# Patient Record
Sex: FEMALE | Race: WHITE | NOT HISPANIC OR LATINO | URBAN - METROPOLITAN AREA
[De-identification: names, ages, dates, MRNs, and addresses within clinical notes are randomized per-mention and may not be internally consistent; named-entity substitution may affect disease eponyms.]

---

## 2018-07-26 ENCOUNTER — OFFICE VISIT CONVERTED (OUTPATIENT)
Dept: OTHER | Facility: HOSPITAL | Age: 78
End: 2018-07-26
Attending: NURSE PRACTITIONER

## 2018-10-31 ENCOUNTER — OFFICE VISIT CONVERTED (OUTPATIENT)
Dept: OTHER | Facility: HOSPITAL | Age: 78
End: 2018-10-31
Attending: NURSE PRACTITIONER

## 2019-02-14 ENCOUNTER — HOSPITAL ENCOUNTER (OUTPATIENT)
Dept: OTHER | Facility: HOSPITAL | Age: 79
Discharge: HOME | End: 2019-02-14
Attending: NURSE PRACTITIONER

## 2019-02-14 ENCOUNTER — OFFICE VISIT CONVERTED (OUTPATIENT)
Dept: OTHER | Facility: HOSPITAL | Age: 79
End: 2019-02-14
Attending: NURSE PRACTITIONER

## 2019-02-14 LAB
ALBUMIN SERPL-MCNC: 4.2 G/DL (ref 3.5–5)
ALBUMIN/GLOB SERPL: 1.5 {RATIO} (ref 1.4–2.6)
ALP SERPL-CCNC: 58 U/L (ref 43–160)
ALT SERPL-CCNC: 9 U/L (ref 10–40)
ANION GAP SERPL CALC-SCNC: 16 MMOL/L (ref 8–19)
AST SERPL-CCNC: 15 U/L (ref 15–50)
BASOPHILS # BLD AUTO: 0.05 10*3/UL (ref 0–0.2)
BASOPHILS NFR BLD AUTO: 0.9 % (ref 0–3)
BILIRUB SERPL-MCNC: 0.37 MG/DL (ref 0.2–1.3)
BUN SERPL-MCNC: 21 MG/DL (ref 5–25)
BUN/CREAT SERPL: 24 {RATIO} (ref 6–20)
CALCIUM SERPL-MCNC: 9.7 MG/DL (ref 8.7–10.4)
CHLORIDE SERPL-SCNC: 102 MMOL/L (ref 99–111)
CHOLEST SERPL-MCNC: 165 MG/DL (ref 107–200)
CHOLEST/HDLC SERPL: 2.6 {RATIO} (ref 3–6)
CONV ABS IMM GRAN: 0.01 10*3/UL (ref 0–0.2)
CONV CO2: 26 MMOL/L (ref 22–32)
CONV IMMATURE GRAN: 0.2 % (ref 0–1.8)
CONV TOTAL PROTEIN: 7 G/DL (ref 6.3–8.2)
CREAT UR-MCNC: 0.89 MG/DL (ref 0.5–0.9)
DEPRECATED RDW RBC AUTO: 48.1 FL (ref 36.4–46.3)
EOSINOPHIL # BLD AUTO: 0.09 10*3/UL (ref 0–0.7)
EOSINOPHIL # BLD AUTO: 1.6 % (ref 0–7)
ERYTHROCYTE [DISTWIDTH] IN BLOOD BY AUTOMATED COUNT: 13.4 % (ref 11.7–14.4)
GFR SERPLBLD BASED ON 1.73 SQ M-ARVRAT: >60 ML/MIN/{1.73_M2}
GLOBULIN UR ELPH-MCNC: 2.8 G/DL (ref 2–3.5)
GLUCOSE SERPL-MCNC: 112 MG/DL (ref 65–99)
HBA1C MFR BLD: 11.8 G/DL (ref 12–16)
HCT VFR BLD AUTO: 37.3 % (ref 37–47)
HDLC SERPL-MCNC: 63 MG/DL (ref 40–60)
LDLC SERPL CALC-MCNC: 83 MG/DL (ref 70–100)
LYMPHOCYTES # BLD AUTO: 1.54 10*3/UL (ref 1–5)
MCH RBC QN AUTO: 30.7 PG (ref 27–31)
MCHC RBC AUTO-ENTMCNC: 31.6 G/DL (ref 33–37)
MCV RBC AUTO: 97.1 FL (ref 81–99)
MONOCYTES # BLD AUTO: 0.48 10*3/UL (ref 0.2–1.2)
MONOCYTES NFR BLD AUTO: 8.6 % (ref 3–10)
NEUTROPHILS # BLD AUTO: 3.38 10*3/UL (ref 2–8)
NEUTROPHILS NFR BLD AUTO: 61 % (ref 30–85)
NRBC CBCN: 0 % (ref 0–0.7)
OSMOLALITY SERPL CALC.SUM OF ELEC: 294 MOSM/KG (ref 273–304)
PLATELET # BLD AUTO: 204 10*3/UL (ref 130–400)
PMV BLD AUTO: 11.8 FL (ref 9.4–12.3)
POTASSIUM SERPL-SCNC: 4.2 MMOL/L (ref 3.5–5.3)
RBC # BLD AUTO: 3.84 10*6/UL (ref 4.2–5.4)
SODIUM SERPL-SCNC: 140 MMOL/L (ref 135–147)
TRIGL SERPL-MCNC: 94 MG/DL (ref 40–150)
TSH SERPL-ACNC: 1.27 M[IU]/L (ref 0.27–4.2)
VARIANT LYMPHS NFR BLD MANUAL: 27.7 % (ref 20–45)
VLDLC SERPL-MCNC: 19 MG/DL (ref 5–37)
WBC # BLD AUTO: 5.55 10*3/UL (ref 4.8–10.8)

## 2019-08-06 ENCOUNTER — OFFICE VISIT CONVERTED (OUTPATIENT)
Dept: OTHER | Facility: HOSPITAL | Age: 79
End: 2019-08-06
Attending: NURSE PRACTITIONER

## 2019-08-06 ENCOUNTER — CONVERSION ENCOUNTER (OUTPATIENT)
Dept: OTHER | Facility: HOSPITAL | Age: 79
End: 2019-08-06

## 2019-08-06 ENCOUNTER — HOSPITAL ENCOUNTER (OUTPATIENT)
Dept: OTHER | Facility: HOSPITAL | Age: 79
Discharge: HOME | End: 2019-08-06
Attending: NURSE PRACTITIONER

## 2019-08-06 LAB
ALBUMIN SERPL-MCNC: 4.5 G/DL (ref 3.5–5)
ALBUMIN/GLOB SERPL: 1.9 {RATIO} (ref 1.4–2.6)
ALP SERPL-CCNC: 62 U/L (ref 43–160)
ALT SERPL-CCNC: 8 U/L (ref 10–40)
ANION GAP SERPL CALC-SCNC: 18 MMOL/L (ref 8–19)
AST SERPL-CCNC: 14 U/L (ref 15–50)
BASOPHILS # BLD AUTO: 0.06 10*3/UL (ref 0–0.2)
BASOPHILS NFR BLD AUTO: 0.8 % (ref 0–3)
BILIRUB SERPL-MCNC: 0.3 MG/DL (ref 0.2–1.3)
BUN SERPL-MCNC: 21 MG/DL (ref 5–25)
BUN/CREAT SERPL: 19 {RATIO} (ref 6–20)
CALCIUM SERPL-MCNC: 10.1 MG/DL (ref 8.7–10.4)
CHLORIDE SERPL-SCNC: 101 MMOL/L (ref 99–111)
CHOLEST SERPL-MCNC: 142 MG/DL (ref 107–200)
CHOLEST/HDLC SERPL: 2.4 {RATIO} (ref 3–6)
CONV ABS IMM GRAN: 0.03 10*3/UL (ref 0–0.2)
CONV CO2: 27 MMOL/L (ref 22–32)
CONV IMMATURE GRAN: 0.4 % (ref 0–1.8)
CONV TOTAL PROTEIN: 6.9 G/DL (ref 6.3–8.2)
CREAT UR-MCNC: 1.11 MG/DL (ref 0.5–0.9)
DEPRECATED RDW RBC AUTO: 49 FL (ref 36.4–46.3)
EOSINOPHIL # BLD AUTO: 0.11 10*3/UL (ref 0–0.7)
EOSINOPHIL # BLD AUTO: 1.5 % (ref 0–7)
ERYTHROCYTE [DISTWIDTH] IN BLOOD BY AUTOMATED COUNT: 13.6 % (ref 11.7–14.4)
GFR SERPLBLD BASED ON 1.73 SQ M-ARVRAT: 47 ML/MIN/{1.73_M2}
GLOBULIN UR ELPH-MCNC: 2.4 G/DL (ref 2–3.5)
GLUCOSE SERPL-MCNC: 96 MG/DL (ref 65–99)
HCT VFR BLD AUTO: 36.5 % (ref 37–47)
HDLC SERPL-MCNC: 60 MG/DL (ref 40–60)
HGB BLD-MCNC: 11.5 G/DL (ref 12–16)
LDLC SERPL CALC-MCNC: 66 MG/DL (ref 70–100)
LYMPHOCYTES # BLD AUTO: 1.91 10*3/UL (ref 1–5)
LYMPHOCYTES NFR BLD AUTO: 25.3 % (ref 20–45)
MCH RBC QN AUTO: 30.8 PG (ref 27–31)
MCHC RBC AUTO-ENTMCNC: 31.5 G/DL (ref 33–37)
MCV RBC AUTO: 97.9 FL (ref 81–99)
MONOCYTES # BLD AUTO: 0.59 10*3/UL (ref 0.2–1.2)
MONOCYTES NFR BLD AUTO: 7.8 % (ref 3–10)
NEUTROPHILS # BLD AUTO: 4.85 10*3/UL (ref 2–8)
NEUTROPHILS NFR BLD AUTO: 64.2 % (ref 30–85)
NRBC CBCN: 0 % (ref 0–0.7)
OSMOLALITY SERPL CALC.SUM OF ELEC: 297 MOSM/KG (ref 273–304)
PLATELET # BLD AUTO: 199 10*3/UL (ref 130–400)
PMV BLD AUTO: 12.4 FL (ref 9.4–12.3)
POTASSIUM SERPL-SCNC: 4.3 MMOL/L (ref 3.5–5.3)
RBC # BLD AUTO: 3.73 10*6/UL (ref 4.2–5.4)
SODIUM SERPL-SCNC: 142 MMOL/L (ref 135–147)
TRIGL SERPL-MCNC: 79 MG/DL (ref 40–150)
VLDLC SERPL-MCNC: 16 MG/DL (ref 5–37)
WBC # BLD AUTO: 7.55 10*3/UL (ref 4.8–10.8)

## 2019-09-05 ENCOUNTER — OFFICE VISIT CONVERTED (OUTPATIENT)
Dept: OTHER | Facility: HOSPITAL | Age: 79
End: 2019-09-05
Attending: NURSE PRACTITIONER

## 2019-09-05 ENCOUNTER — CONVERSION ENCOUNTER (OUTPATIENT)
Dept: OTHER | Facility: HOSPITAL | Age: 79
End: 2019-09-05

## 2019-10-17 ENCOUNTER — OFFICE VISIT CONVERTED (OUTPATIENT)
Dept: OTHER | Facility: HOSPITAL | Age: 79
End: 2019-10-17
Attending: NURSE PRACTITIONER

## 2019-10-17 ENCOUNTER — CONVERSION ENCOUNTER (OUTPATIENT)
Dept: OTHER | Facility: HOSPITAL | Age: 79
End: 2019-10-17

## 2020-01-23 ENCOUNTER — OFFICE VISIT CONVERTED (OUTPATIENT)
Dept: OTHER | Facility: HOSPITAL | Age: 80
End: 2020-01-23
Attending: NURSE PRACTITIONER

## 2020-01-23 ENCOUNTER — HOSPITAL ENCOUNTER (OUTPATIENT)
Dept: OTHER | Facility: HOSPITAL | Age: 80
Discharge: HOME | End: 2020-01-23
Attending: NURSE PRACTITIONER

## 2020-01-23 ENCOUNTER — CONVERSION ENCOUNTER (OUTPATIENT)
Dept: OTHER | Facility: HOSPITAL | Age: 80
End: 2020-01-23

## 2020-01-23 LAB
ALBUMIN SERPL-MCNC: 4.2 G/DL (ref 3.5–5)
ALBUMIN/GLOB SERPL: 1.6 {RATIO} (ref 1.4–2.6)
ALP SERPL-CCNC: 104 U/L (ref 43–160)
ALT SERPL-CCNC: 7 U/L (ref 10–40)
ANION GAP SERPL CALC-SCNC: 17 MMOL/L (ref 8–19)
AST SERPL-CCNC: 15 U/L (ref 15–50)
BASOPHILS # BLD AUTO: 0.06 10*3/UL (ref 0–0.2)
BASOPHILS NFR BLD AUTO: 0.7 % (ref 0–3)
BILIRUB SERPL-MCNC: 0.38 MG/DL (ref 0.2–1.3)
BUN SERPL-MCNC: 15 MG/DL (ref 5–25)
BUN/CREAT SERPL: 22 {RATIO} (ref 6–20)
CALCIUM SERPL-MCNC: 10 MG/DL (ref 8.7–10.4)
CHLORIDE SERPL-SCNC: 101 MMOL/L (ref 99–111)
CHOLEST SERPL-MCNC: 136 MG/DL (ref 107–200)
CHOLEST/HDLC SERPL: 2.3 {RATIO} (ref 3–6)
CONV ABS IMM GRAN: 0.03 10*3/UL (ref 0–0.2)
CONV CO2: 27 MMOL/L (ref 22–32)
CONV IMMATURE GRAN: 0.4 % (ref 0–1.8)
CONV TOTAL PROTEIN: 6.8 G/DL (ref 6.3–8.2)
CREAT UR-MCNC: 0.67 MG/DL (ref 0.5–0.9)
DEPRECATED RDW RBC AUTO: 47.8 FL (ref 36.4–46.3)
EOSINOPHIL # BLD AUTO: 0.06 10*3/UL (ref 0–0.7)
EOSINOPHIL # BLD AUTO: 0.7 % (ref 0–7)
ERYTHROCYTE [DISTWIDTH] IN BLOOD BY AUTOMATED COUNT: 13.3 % (ref 11.7–14.4)
GFR SERPLBLD BASED ON 1.73 SQ M-ARVRAT: >60 ML/MIN/{1.73_M2}
GLOBULIN UR ELPH-MCNC: 2.6 G/DL (ref 2–3.5)
GLUCOSE SERPL-MCNC: 103 MG/DL (ref 65–99)
HCT VFR BLD AUTO: 38.8 % (ref 37–47)
HDLC SERPL-MCNC: 58 MG/DL (ref 40–60)
HGB BLD-MCNC: 12.4 G/DL (ref 12–16)
LDLC SERPL CALC-MCNC: 59 MG/DL (ref 70–100)
LYMPHOCYTES # BLD AUTO: 1.56 10*3/UL (ref 1–5)
LYMPHOCYTES NFR BLD AUTO: 18.7 % (ref 20–45)
MCH RBC QN AUTO: 31.2 PG (ref 27–31)
MCHC RBC AUTO-ENTMCNC: 32 G/DL (ref 33–37)
MCV RBC AUTO: 97.7 FL (ref 81–99)
MONOCYTES # BLD AUTO: 0.58 10*3/UL (ref 0.2–1.2)
MONOCYTES NFR BLD AUTO: 6.9 % (ref 3–10)
NEUTROPHILS # BLD AUTO: 6.07 10*3/UL (ref 2–8)
NEUTROPHILS NFR BLD AUTO: 72.6 % (ref 30–85)
NRBC CBCN: 0 % (ref 0–0.7)
OSMOLALITY SERPL CALC.SUM OF ELEC: 293 MOSM/KG (ref 273–304)
PLATELET # BLD AUTO: 251 10*3/UL (ref 130–400)
PMV BLD AUTO: 11.8 FL (ref 9.4–12.3)
POTASSIUM SERPL-SCNC: 4.4 MMOL/L (ref 3.5–5.3)
RBC # BLD AUTO: 3.97 10*6/UL (ref 4.2–5.4)
SODIUM SERPL-SCNC: 141 MMOL/L (ref 135–147)
TRIGL SERPL-MCNC: 96 MG/DL (ref 40–150)
TSH SERPL-ACNC: 1.15 M[IU]/L (ref 0.27–4.2)
VLDLC SERPL-MCNC: 19 MG/DL (ref 5–37)
WBC # BLD AUTO: 8.36 10*3/UL (ref 4.8–10.8)

## 2021-05-15 VITALS
SYSTOLIC BLOOD PRESSURE: 128 MMHG | RESPIRATION RATE: 18 BRPM | HEIGHT: 60 IN | HEART RATE: 76 BPM | BODY MASS INDEX: 23.24 KG/M2 | OXYGEN SATURATION: 94 % | DIASTOLIC BLOOD PRESSURE: 80 MMHG | TEMPERATURE: 97.8 F | WEIGHT: 118.37 LBS

## 2021-05-15 VITALS
WEIGHT: 119.12 LBS | RESPIRATION RATE: 16 BRPM | BODY MASS INDEX: 23.39 KG/M2 | OXYGEN SATURATION: 96 % | TEMPERATURE: 97.8 F | SYSTOLIC BLOOD PRESSURE: 92 MMHG | HEIGHT: 60 IN | DIASTOLIC BLOOD PRESSURE: 62 MMHG | HEART RATE: 74 BPM

## 2021-05-15 VITALS
TEMPERATURE: 97.5 F | OXYGEN SATURATION: 96 % | WEIGHT: 119.12 LBS | BODY MASS INDEX: 23.39 KG/M2 | HEART RATE: 76 BPM | SYSTOLIC BLOOD PRESSURE: 130 MMHG | HEIGHT: 60 IN | DIASTOLIC BLOOD PRESSURE: 68 MMHG | RESPIRATION RATE: 16 BRPM

## 2021-05-15 VITALS
RESPIRATION RATE: 18 BRPM | BODY MASS INDEX: 23.61 KG/M2 | HEART RATE: 69 BPM | SYSTOLIC BLOOD PRESSURE: 102 MMHG | HEIGHT: 60 IN | OXYGEN SATURATION: 95 % | TEMPERATURE: 97.5 F | WEIGHT: 120.25 LBS | DIASTOLIC BLOOD PRESSURE: 62 MMHG

## 2021-05-16 VITALS
DIASTOLIC BLOOD PRESSURE: 71 MMHG | WEIGHT: 120 LBS | TEMPERATURE: 97.1 F | BODY MASS INDEX: 23.56 KG/M2 | SYSTOLIC BLOOD PRESSURE: 131 MMHG | RESPIRATION RATE: 16 BRPM | HEART RATE: 73 BPM | OXYGEN SATURATION: 96 % | HEIGHT: 60 IN

## 2021-05-16 VITALS
TEMPERATURE: 98.1 F | WEIGHT: 119 LBS | HEIGHT: 60 IN | SYSTOLIC BLOOD PRESSURE: 100 MMHG | RESPIRATION RATE: 18 BRPM | OXYGEN SATURATION: 95 % | BODY MASS INDEX: 23.36 KG/M2 | HEART RATE: 80 BPM | DIASTOLIC BLOOD PRESSURE: 50 MMHG

## 2021-05-16 VITALS
RESPIRATION RATE: 18 BRPM | TEMPERATURE: 97 F | DIASTOLIC BLOOD PRESSURE: 70 MMHG | HEART RATE: 80 BPM | BODY MASS INDEX: 23.95 KG/M2 | HEIGHT: 60 IN | WEIGHT: 122 LBS | OXYGEN SATURATION: 95 % | SYSTOLIC BLOOD PRESSURE: 100 MMHG

## 2024-07-29 ENCOUNTER — APPOINTMENT (OUTPATIENT)
Dept: GENERAL RADIOLOGY | Facility: HOSPITAL | Age: 84
DRG: 481 | End: 2024-07-29
Payer: MEDICARE

## 2024-07-29 ENCOUNTER — HOSPITAL ENCOUNTER (INPATIENT)
Facility: HOSPITAL | Age: 84
LOS: 7 days | Discharge: HOME HEALTH | DRG: 481 | End: 2024-08-06
Attending: EMERGENCY MEDICINE | Admitting: INTERNAL MEDICINE
Payer: MEDICARE

## 2024-07-29 DIAGNOSIS — R26.2 DIFFICULTY IN WALKING: ICD-10-CM

## 2024-07-29 DIAGNOSIS — S72.142A CLOSED INTERTROCHANTERIC FRACTURE OF HIP, LEFT, INITIAL ENCOUNTER: Primary | ICD-10-CM

## 2024-07-29 PROBLEM — T14.8XXA FRACTURE: Status: ACTIVE | Noted: 2024-07-29

## 2024-07-29 LAB
ALBUMIN SERPL-MCNC: 3.8 G/DL (ref 3.5–5.2)
ALBUMIN/GLOB SERPL: 1.7 G/DL
ALP SERPL-CCNC: 70 U/L (ref 39–117)
ALT SERPL W P-5'-P-CCNC: 7 U/L (ref 1–33)
ANION GAP SERPL CALCULATED.3IONS-SCNC: 11.3 MMOL/L (ref 5–15)
APTT PPP: 29.7 SECONDS (ref 78–95.9)
AST SERPL-CCNC: 14 U/L (ref 1–32)
BASOPHILS # BLD AUTO: 0.05 10*3/MM3 (ref 0–0.2)
BASOPHILS NFR BLD AUTO: 0.4 % (ref 0–1.5)
BILIRUB SERPL-MCNC: 0.5 MG/DL (ref 0–1.2)
BUN SERPL-MCNC: 22 MG/DL (ref 8–23)
BUN/CREAT SERPL: 28.2 (ref 7–25)
CALCIUM SPEC-SCNC: 9.4 MG/DL (ref 8.6–10.5)
CHLORIDE SERPL-SCNC: 105 MMOL/L (ref 98–107)
CO2 SERPL-SCNC: 27.7 MMOL/L (ref 22–29)
CREAT SERPL-MCNC: 0.78 MG/DL (ref 0.57–1)
DEPRECATED RDW RBC AUTO: 49.4 FL (ref 37–54)
EGFRCR SERPLBLD CKD-EPI 2021: 75 ML/MIN/1.73
EOSINOPHIL # BLD AUTO: 0.01 10*3/MM3 (ref 0–0.4)
EOSINOPHIL NFR BLD AUTO: 0.1 % (ref 0.3–6.2)
ERYTHROCYTE [DISTWIDTH] IN BLOOD BY AUTOMATED COUNT: 14 % (ref 12.3–15.4)
GLOBULIN UR ELPH-MCNC: 2.3 GM/DL
GLUCOSE SERPL-MCNC: 133 MG/DL (ref 65–99)
HCT VFR BLD AUTO: 33.8 % (ref 34–46.6)
HGB BLD-MCNC: 11.2 G/DL (ref 12–15.9)
IMM GRANULOCYTES # BLD AUTO: 0.07 10*3/MM3 (ref 0–0.05)
IMM GRANULOCYTES NFR BLD AUTO: 0.6 % (ref 0–0.5)
INR PPP: 1.13 (ref 0.86–1.15)
LYMPHOCYTES # BLD AUTO: 0.76 10*3/MM3 (ref 0.7–3.1)
LYMPHOCYTES NFR BLD AUTO: 6.4 % (ref 19.6–45.3)
MCH RBC QN AUTO: 31.9 PG (ref 26.6–33)
MCHC RBC AUTO-ENTMCNC: 33.1 G/DL (ref 31.5–35.7)
MCV RBC AUTO: 96.3 FL (ref 79–97)
MONOCYTES # BLD AUTO: 0.62 10*3/MM3 (ref 0.1–0.9)
MONOCYTES NFR BLD AUTO: 5.2 % (ref 5–12)
NEUTROPHILS NFR BLD AUTO: 10.38 10*3/MM3 (ref 1.7–7)
NEUTROPHILS NFR BLD AUTO: 87.3 % (ref 42.7–76)
NRBC BLD AUTO-RTO: 0 /100 WBC (ref 0–0.2)
PLATELET # BLD AUTO: 241 10*3/MM3 (ref 140–450)
PMV BLD AUTO: 10.7 FL (ref 6–12)
POTASSIUM SERPL-SCNC: 4 MMOL/L (ref 3.5–5.2)
PROT SERPL-MCNC: 6.1 G/DL (ref 6–8.5)
PROTHROMBIN TIME: 14.8 SECONDS (ref 11.8–14.9)
QT INTERVAL: 370 MS
QT INTERVAL: 374 MS
QTC INTERVAL: 470 MS
QTC INTERVAL: 484 MS
RBC # BLD AUTO: 3.51 10*6/MM3 (ref 3.77–5.28)
SODIUM SERPL-SCNC: 144 MMOL/L (ref 136–145)
WBC NRBC COR # BLD AUTO: 11.89 10*3/MM3 (ref 3.4–10.8)

## 2024-07-29 PROCEDURE — G0378 HOSPITAL OBSERVATION PER HR: HCPCS

## 2024-07-29 PROCEDURE — 94761 N-INVAS EAR/PLS OXIMETRY MLT: CPT

## 2024-07-29 PROCEDURE — 36415 COLL VENOUS BLD VENIPUNCTURE: CPT

## 2024-07-29 PROCEDURE — 85610 PROTHROMBIN TIME: CPT | Performed by: EMERGENCY MEDICINE

## 2024-07-29 PROCEDURE — 93005 ELECTROCARDIOGRAM TRACING: CPT

## 2024-07-29 PROCEDURE — 99285 EMERGENCY DEPT VISIT HI MDM: CPT

## 2024-07-29 PROCEDURE — 85730 THROMBOPLASTIN TIME PARTIAL: CPT | Performed by: EMERGENCY MEDICINE

## 2024-07-29 PROCEDURE — 85025 COMPLETE CBC W/AUTO DIFF WBC: CPT | Performed by: EMERGENCY MEDICINE

## 2024-07-29 PROCEDURE — 99222 1ST HOSP IP/OBS MODERATE 55: CPT | Performed by: HOSPITALIST

## 2024-07-29 PROCEDURE — 71045 X-RAY EXAM CHEST 1 VIEW: CPT

## 2024-07-29 PROCEDURE — 25010000002 HYDROMORPHONE 1 MG/ML SOLUTION: Performed by: HOSPITALIST

## 2024-07-29 PROCEDURE — 93005 ELECTROCARDIOGRAM TRACING: CPT | Performed by: EMERGENCY MEDICINE

## 2024-07-29 PROCEDURE — 80053 COMPREHEN METABOLIC PANEL: CPT | Performed by: EMERGENCY MEDICINE

## 2024-07-29 PROCEDURE — 94640 AIRWAY INHALATION TREATMENT: CPT

## 2024-07-29 PROCEDURE — 94799 UNLISTED PULMONARY SVC/PX: CPT

## 2024-07-29 PROCEDURE — 73502 X-RAY EXAM HIP UNI 2-3 VIEWS: CPT

## 2024-07-29 RX ORDER — BUDESONIDE 0.5 MG/2ML
0.5 INHALANT ORAL 2 TIMES DAILY
COMMUNITY
End: 2024-08-06 | Stop reason: HOSPADM

## 2024-07-29 RX ORDER — TRAMADOL HYDROCHLORIDE 50 MG/1
50 TABLET ORAL EVERY 8 HOURS PRN
Status: DISCONTINUED | OUTPATIENT
Start: 2024-07-29 | End: 2024-07-30

## 2024-07-29 RX ORDER — ASPIRIN 81 MG/1
81 TABLET ORAL DAILY
COMMUNITY
End: 2024-08-06 | Stop reason: HOSPADM

## 2024-07-29 RX ORDER — ASPIRIN 81 MG/1
81 TABLET, CHEWABLE ORAL DAILY
COMMUNITY
End: 2024-07-29

## 2024-07-29 RX ORDER — ALBUTEROL SULFATE 2.5 MG/3ML
2.5 SOLUTION RESPIRATORY (INHALATION) EVERY 6 HOURS PRN
Status: DISCONTINUED | OUTPATIENT
Start: 2024-07-29 | End: 2024-08-06 | Stop reason: HOSPADM

## 2024-07-29 RX ORDER — NICOTINE 21 MG/24HR
1 PATCH, TRANSDERMAL 24 HOURS TRANSDERMAL
Status: DISCONTINUED | OUTPATIENT
Start: 2024-07-29 | End: 2024-08-06 | Stop reason: HOSPADM

## 2024-07-29 RX ORDER — TRAMADOL HYDROCHLORIDE 50 MG/1
50 TABLET ORAL EVERY 8 HOURS PRN
COMMUNITY
Start: 2024-07-17 | End: 2024-08-06 | Stop reason: HOSPADM

## 2024-07-29 RX ORDER — SERTRALINE HYDROCHLORIDE 25 MG/1
25 TABLET, FILM COATED ORAL DAILY
Status: DISCONTINUED | OUTPATIENT
Start: 2024-07-29 | End: 2024-08-06 | Stop reason: HOSPADM

## 2024-07-29 RX ORDER — BUDESONIDE 0.5 MG/2ML
0.5 INHALANT ORAL 2 TIMES DAILY
Status: DISCONTINUED | OUTPATIENT
Start: 2024-07-29 | End: 2024-08-06 | Stop reason: HOSPADM

## 2024-07-29 RX ORDER — ASPIRIN 81 MG/1
81 TABLET ORAL DAILY
Status: DISCONTINUED | OUTPATIENT
Start: 2024-07-29 | End: 2024-08-06 | Stop reason: HOSPADM

## 2024-07-29 RX ORDER — SERTRALINE HYDROCHLORIDE 25 MG/1
25 TABLET, FILM COATED ORAL DAILY
Status: ON HOLD | COMMUNITY
Start: 2024-07-25 | End: 2024-08-05

## 2024-07-29 RX ORDER — LEVALBUTEROL INHALATION SOLUTION 1.25 MG/3ML
1 SOLUTION RESPIRATORY (INHALATION) EVERY 6 HOURS PRN
COMMUNITY
Start: 2024-07-26 | End: 2024-08-06 | Stop reason: HOSPADM

## 2024-07-29 RX ORDER — PRAVASTATIN SODIUM 20 MG
40 TABLET ORAL NIGHTLY
Status: DISCONTINUED | OUTPATIENT
Start: 2024-07-29 | End: 2024-08-06 | Stop reason: HOSPADM

## 2024-07-29 RX ORDER — DILTIAZEM HCL 90 MG
90 TABLET ORAL DAILY
COMMUNITY
Start: 2024-07-17 | End: 2024-08-06 | Stop reason: HOSPADM

## 2024-07-29 RX ORDER — DULOXETIN HYDROCHLORIDE 60 MG/1
60 CAPSULE, DELAYED RELEASE ORAL DAILY
COMMUNITY
Start: 2024-06-19 | End: 2024-07-29

## 2024-07-29 RX ORDER — PRAVASTATIN SODIUM 40 MG
40 TABLET ORAL DAILY
Status: ON HOLD | COMMUNITY
Start: 2023-08-02 | End: 2024-08-05

## 2024-07-29 RX ORDER — SODIUM CHLORIDE 0.9 % (FLUSH) 0.9 %
10 SYRINGE (ML) INJECTION AS NEEDED
Status: DISCONTINUED | OUTPATIENT
Start: 2024-07-29 | End: 2024-08-06 | Stop reason: HOSPADM

## 2024-07-29 RX ADMIN — DILTIAZEM HYDROCHLORIDE 90 MG: 60 TABLET, FILM COATED ORAL at 21:58

## 2024-07-29 RX ADMIN — Medication 10 ML: at 21:58

## 2024-07-29 RX ADMIN — PRAVASTATIN SODIUM 40 MG: 20 TABLET ORAL at 21:58

## 2024-07-29 RX ADMIN — TRAMADOL HYDROCHLORIDE 50 MG: 50 TABLET ORAL at 21:58

## 2024-07-29 RX ADMIN — BUDESONIDE 0.5 MG: 0.5 INHALANT ORAL at 20:01

## 2024-07-29 RX ADMIN — NICOTINE 1 PATCH: 21 PATCH, EXTENDED RELEASE TRANSDERMAL at 21:57

## 2024-07-29 RX ADMIN — HYDROMORPHONE HYDROCHLORIDE 1 MG: 1 INJECTION, SOLUTION INTRAMUSCULAR; INTRAVENOUS; SUBCUTANEOUS at 19:15

## 2024-07-29 NOTE — CASE MANAGEMENT/SOCIAL WORK
Discharge Planning Assessment  WALESKA Segura     Patient Name: Danielle Granda  MRN: [id number]  Today's Date: 7/29/2024    Admit Date: 7/29/2024        Discharge Needs Assessment       Row Name 07/29/24 1819       Living Environment    People in Home child(sandeep), adult (P)     Current Living Arrangements home (P)     In the past 12 months has the electric, gas, oil, or water company threatened to shut off services in your home? No (P)     Primary Care Provided by self (P)     Provides Primary Care For no one (P)     Able to Return to Prior Arrangements yes (P)        Resource/Environmental Concerns    Transportation Concerns none (P)        Transportation Needs    In the past 12 months, has lack of transportation kept you from medical appointments or from getting medications? no (P)     In the past 12 months, has lack of transportation kept you from meetings, work, or from getting things needed for daily living? No (P)        Food Insecurity    Within the past 12 months, you worried that your food would run out before you got the money to buy more. Never true (P)     Within the past 12 months, the food you bought just didn't last and you didn't have money to get more. Never true (P)        Transition Planning    Patient/Family Anticipates Transition to home (P)     Patient/Family Anticipated Services at Transition none (P)     Transportation Anticipated family or friend will provide (P)        Discharge Needs Assessment    Readmission Within the Last 30 Days no previous admission in last 30 days (P)     Equipment Currently Used at Home cane, straight (P)     Concerns to be Addressed no discharge needs identified (P)     Equipment Needed After Discharge none (P)                    Discharge Plan    No documentation.                 Continued Care and Services - Admitted Since 7/29/2024    No active coordination exists for this encounter.          Demographic Summary       Row Name 07/29/24 1818       General  Information    Admission Type observation (P)     Arrived From home (P)     Referral Source emergency department (P)     Reason for Consult discharge planning (P)     Preferred Language English (P)                    Functional Status       Row Name 07/29/24 1818       Functional Status    Usual Activity Tolerance fair (P)     Current Activity Tolerance fair (P)        Physical Activity    On average, how many days per week do you engage in moderate to strenuous exercise (like a brisk walk)? 0 days (P)     On average, how many minutes do you engage in exercise at this level? 0 min (P)     Number of minutes of exercise per week 0 (P)        Assessment of Health Literacy    How often do you have someone help you read hospital materials? Sometimes (P)     How often do you have problems learning about your medical condition because of difficulty understanding written information? Sometimes (P)     How often do you have a problem understanding what is told to you about your medical condition? Sometimes (P)     How confident are you filling out medical forms by yourself? Somewhat (P)     Health Literacy Moderate (P)        Functional Status, IADL    Medications independent;assistive person (P)     Meal Preparation independent (P)     Housekeeping independent (P)     Laundry independent (P)     Shopping independent (P)        Mental Status    General Appearance WDL WDL (P)        Mental Status Summary    Recent Changes in Mental Status/Cognitive Functioning no changes (P)                    Psychosocial    No documentation.                  Abuse/Neglect       Row Name 07/29/24 1819       Personal Safety    Feels Unsafe at Home or Work/School no (P)     Feels Threatened by Someone no (P)     Does Anyone Try to Keep You From Having Contact with Others or Doing Things Outside Your Home? no (P)     Physical Signs of Abuse Present no (P)                    Legal       Row Name 07/29/24 1815       Financial Resource Strain     How hard is it for you to pay for the very basics like food, housing, medical care, and heating? Not hard (P)                    Substance Abuse    No documentation.                  Patient Forms    No documentation.                 SW student met with pt bedside in ED. Present bedside was family. Pt lives at home with her son. Pt can complete ADLs independently. Family assists with medications sometimes. Pt drives herself, but family will provide transportation upon discharge. Pt uses a cane at home. Pt denies concerns with transportation, access to food, and paying bills. Pt feels safe at home. Pt does not work. Pt is not a . Pt denies alcohol and substance use. Pt reports smoking a half a pack of cigarettes a day. Pt is unsure what needs she may have upon discharge.     Ana Gomez, Social Work Student

## 2024-07-29 NOTE — H&P
[facility]  Date: 2024   Patient Name: Danielle Granda  : 1940  MRN: [id number]  Primary Care Physician:  Provider, No Known  Date of admission: 2024    Subjective Hip fracture  Subjective   Chief Complaint: hip fracture    HPI:  Danielle Granda is a 84 y.o. female who fell with resultant hip fracture.  Patient's VSS.  She has a pulse of 99, BP-114/61, on 2 L of oxygen.      Patients WBC-11.89.  glucose is 133.      Chest xray is normal.    Hip xray:1. Comminuted intertrochanteric fracture of the left femoral neck with mild impaction.   2. Diffuse osteopenia.   Personal History     Past Medical History:  Past Medical History:   Diagnosis Date    Anxiety     Hyperlipidemia     Hypertension          Past Surgical History:  Past Surgical History:   Procedure Laterality Date    HYSTERECTOMY          Family History:   Breast Cancer-related family history is not on file.      Social History:   Social History     Socioeconomic History    Marital status: Single   Tobacco Use    Smoking status: Every Day     Current packs/day: 0.50     Types: Cigarettes    Smokeless tobacco: Never   Vaping Use    Vaping status: Never Used   Substance and Sexual Activity    Alcohol use: Never    Drug use: Never         Home Medications:  Cyanocobalamin, aspirin, budesonide, cholecalciferol, dilTIAZem, levalbuterol, pravastatin, sertraline, and traMADol    Allergies:  No Known Allergies    Review of Systems   All systems were reviewed and negative except for: hip pain    Objective   Objective     Vitals:   Temp:  [97.9 °F (36.6 °C)] 97.9 °F (36.6 °C)  Heart Rate:  [] 99  Resp:  [16] 16  BP: (102-128)/(49-81) 114/61  Flow (L/min):  [2] 2    Physical Exam   Physical Exam  HENT:      Head: Normocephalic.      Nose: Nose normal.   Eyes:      Pupils: Pupils are equal, round, and reactive to light.   Cardiovascular:      Rate and Rhythm: Normal rate.   Pulmonary:      Effort:  Pulmonary effort is normal.   Abdominal:      General: Abdomen is flat. Bowel sounds are normal.      Palpations: Abdomen is soft.   Musculoskeletal:         General: Normal range of motion.   Skin:     General: Skin is warm and dry.   Neurological:      General: No focal deficit present.      Mental Status: She is alert.   Psychiatric:         Mood and Affect: Mood normal.          Result Review      Result Review:  I have personally reviewed the results from the time of this admission to 7/29/2024 19:33 EDT and agree with these findings:  [x]  Laboratory  [x]  Microbiology  [x]  Radiology  [x]  EKG/Telemetry   [x]  Cardiology/Vascular   [x]  Pathology  [x]  Old records  []  Other:    Lab Results (most recent)       Procedure Component Value Units Date/Time    Comprehensive Metabolic Panel [[id number]]  (Abnormal) Collected: 07/29/24 1557    Specimen: Blood Updated: 07/29/24 1619     Glucose 133 mg/dL      BUN 22 mg/dL      Creatinine 0.78 mg/dL      Sodium 144 mmol/L      Potassium 4.0 mmol/L      Chloride 105 mmol/L      CO2 27.7 mmol/L      Calcium 9.4 mg/dL      Total Protein 6.1 g/dL      Albumin 3.8 g/dL      ALT (SGPT) 7 U/L      AST (SGOT) 14 U/L      Alkaline Phosphatase 70 U/L      Total Bilirubin 0.5 mg/dL      Globulin 2.3 gm/dL      A/G Ratio 1.7 g/dL      BUN/Creatinine Ratio 28.2     Anion Gap 11.3 mmol/L      eGFR 75.0 mL/min/1.73     Narrative:      GFR Normal >60  Chronic Kidney Disease <60  Kidney Failure <15    The GFR formula is only valid for adults with stable renal function between ages 18 and 70.    Protime-INR [[id number]]  (Normal) Collected: 07/29/24 1557    Specimen: Blood Updated: 07/29/24 1615     Protime 14.8 Seconds      INR 1.13    Narrative:      Suggested Therapeutic Ranges For Oral Anticoagulant Therapy:  Level of Therapy                      INR Target Range  Standard Dose                            2.0-3.0  High Dose                                2.5-3.5  Patients not receiving  anticoagulant  Therapy Normal Range                     0.86-1.15    aPTT [[id number]]  (Abnormal) Collected: 07/29/24 1557    Specimen: Blood Updated: 07/29/24 1615     PTT 29.7 seconds     CBC & Differential [[id number]]  (Abnormal) Collected: 07/29/24 1557    Specimen: Blood Updated: 07/29/24 1604    Narrative:      The following orders were created for panel order CBC & Differential.  Procedure                               Abnormality         Status                     ---------                               -----------         ------                     CBC Auto Differential[[id number]]        Abnormal            Final result                 Please view results for these tests on the individual orders.    CBC Auto Differential [[id number]]  (Abnormal) Collected: 07/29/24 1557    Specimen: Blood Updated: 07/29/24 1604     WBC 11.89 10*3/mm3      RBC 3.51 10*6/mm3      Hemoglobin 11.2 g/dL      Hematocrit 33.8 %      MCV 96.3 fL      MCH 31.9 pg      MCHC 33.1 g/dL      RDW 14.0 %      RDW-SD 49.4 fl      MPV 10.7 fL      Platelets 241 10*3/mm3      Neutrophil % 87.3 %      Lymphocyte % 6.4 %      Monocyte % 5.2 %      Eosinophil % 0.1 %      Basophil % 0.4 %      Immature Grans % 0.6 %      Neutrophils, Absolute 10.38 10*3/mm3      Lymphocytes, Absolute 0.76 10*3/mm3      Monocytes, Absolute 0.62 10*3/mm3      Eosinophils, Absolute 0.01 10*3/mm3      Basophils, Absolute 0.05 10*3/mm3      Immature Grans, Absolute 0.07 10*3/mm3      nRBC 0.0 /100 WBC             XR Chest 1 View    Result Date: 7/29/2024  No radiographic findings of acute cardiopulmonary abnormality. Electronically Signed: Lyle Corona  7/29/2024 4:26 PM EDT  Workstation ID: [id number]    XR Hip With or Without Pelvis 2 - 3 View Left    Result Date: 7/29/2024  Impression: 1. Comminuted intertrochanteric fracture of the left femoral neck with mild impaction. 2. Diffuse osteopenia. Electronically Signed: Yaniv Pal MD  7/29/2024 3:08 PM EDT   Workstation ID: [id number]     Assessment & Plan   Assessment / Plan   Assessment/Plan:   #1 left hip fracture  -surgery planned for tomorrow  -PT/ot/Case management  -pain management     #2 HTN continue home med    #3 COPD not in acute exacerbation   -albuterol prn        VTE Prophylaxis:  No VTE prophylaxis order currently exists.        CODE STATUS:    Level Of Support Discussed With: Patient  Code Status (Patient has no pulse and is not breathing): CPR (Attempt to Resuscitate)  Medical Interventions (Patient has pulse or is breathing): Full Support      Admission Status:  I believe this patient meets inpatient status.    Electronically signed by Suri Espinosa DO, 07/29/24, 7:15 PM EDT.

## 2024-07-29 NOTE — ED PROVIDER NOTES
Time: 4:45 PM EDT  Date of encounter:  7/29/2024  Independent Historian/Clinical History and Information was obtained by:   Patient and EMS  Chief Complaint: Fall with left hip injury    History is limited by: N/A    History of Present Illness:  Patient is a 84 y.o. year old female who presents to the emergency department for evaluation of left hip injury after a fall.  Patient reports she twisted and lost her footing.  Patient complains of severe pain involving her left hip and is not able to move her leg or putting weight on her leg.  Patient denies hitting her head nor any loss of consciousness.    HPI    Patient Care Team  Primary Care Provider: Provider, Corinne Known    Past Medical History:     No Known Allergies  Past Medical History:   Diagnosis Date    Anxiety     Hyperlipidemia     Hypertension      Past Surgical History:   Procedure Laterality Date    HYSTERECTOMY       History reviewed. No pertinent family history.    Home Medications:  Prior to Admission medications    Medication Sig Start Date End Date Taking? Authorizing Provider   aspirin 81 MG EC tablet Take 1 tablet by mouth Daily.   Yes Reilly Cruz MD   budesonide (PULMICORT) 0.5 MG/2ML nebulizer solution Take 2 mL by nebulization 2 (Two) Times a Day.   Yes Reilly Cruz MD   cholecalciferol (VITAMIN D3) 1.25 MG (16125 UT) capsule Take 1 capsule by mouth Every 7 (Seven) Days. 6/20/24 9/19/24 Yes Reilly Cruz MD   Cyanocobalamin 2500 MCG sublingual tablet Place 2,500 mcg under the tongue Daily. 6/20/24 12/18/24 Yes Reilly Cruz MD   dilTIAZem (CARDIZEM) 90 MG tablet Take 1 tablet by mouth Daily. 7/17/24 7/18/25 Yes Reilly Cruz MD   levalbuterol (XOPENEX) 1.25 MG/3ML nebulizer solution Take 1 ampule by nebulization Every 6 (Six) Hours As Needed. 7/26/24  Yes Reilly Cruz MD   pravastatin (PRAVACHOL) 40 MG tablet Take 1 tablet by mouth Daily. 8/2/23 8/2/24 Yes Reilly Cruz MD   sertraline  "(ZOLOFT) 25 MG tablet Take 1 tablet by mouth Daily. 7/25/24 7/26/25 Yes ProviderReilly MD   traMADol (ULTRAM) 50 MG tablet Take 1 tablet by mouth Every 8 (Eight) Hours As Needed for Moderate Pain. 7/17/24  Yes Reilly Cruz MD   DULoxetine (CYMBALTA) 60 MG capsule Take 1 capsule by mouth Daily. 6/19/24 7/29/24 Yes Reilly Cruz MD   aspirin 81 MG chewable tablet Chew 1 tablet Daily.  7/29/24  ProviderReilly MD        Social History:   Social History     Tobacco Use    Smoking status: Every Day     Current packs/day: 0.50     Types: Cigarettes    Smokeless tobacco: Never   Vaping Use    Vaping status: Never Used   Substance Use Topics    Alcohol use: Never    Drug use: Never         Review of Systems:  Review of Systems   Constitutional:  Negative for chills and fever.   HENT:  Negative for congestion, ear pain and sore throat.    Eyes:  Negative for pain.   Respiratory:  Negative for cough, chest tightness and shortness of breath.    Cardiovascular:  Negative for chest pain.   Gastrointestinal:  Negative for abdominal pain, diarrhea, nausea and vomiting.   Genitourinary:  Negative for flank pain and hematuria.   Musculoskeletal:  Positive for arthralgias. Negative for joint swelling.   Skin:  Negative for pallor.   Neurological:  Negative for seizures and headaches.   All other systems reviewed and are negative.       Physical Exam:  /53   Pulse 98   Temp 97.9 °F (36.6 °C) (Oral)   Resp 16   Ht 154.9 cm (61\")   Wt 48.8 kg (107 lb 9.4 oz)   SpO2 94%   Breastfeeding No   BMI 20.33 kg/m²     Physical Exam    Vital signs were reviewed under triage note.  General appearance - Patient appears well-developed and well-nourished.  Patient is in no acute distress.  Head - Normocephalic, atraumatic.  Pupils - Equal, round, reactive to light.  Extraocular muscles are intact.  Conjunctiva is clear.  Nasal - Normal inspection.  No evidence of trauma or epistaxis.  Tympanic membranes - " Gray, intact without erythema or retractions.  Oral mucosa - Pink and moist without lesions or erythema.  Uvula is midline.  Chest wall - Atraumatic.  Chest wall is nontender.  There are no vesicular rashes noted.  Neck - Supple.  Trachea was midline.  There is no palpable lymphadenopathy or thyromegaly.  There are no meningeal signs  Lungs - Clear to auscultation and percussion bilaterally.  Heart - Regular rate and rhythm without any murmurs, clicks, or gallops.  Abdomen - Soft.  Bowel sounds are present.  There is no palpable tenderness.  There is no rebound, guarding, or rigidity.  There are no palpable masses.  There are no pulsatile masses.  Back - Spine is straight and midline.  There is no CVA tenderness.  Extremities - Intact x4.  Patient's left leg is shortened externally rotated with severe tenderness with palpation or movement of her left hip.  There is no palpable edema.  Pulses are intact x4 and equal.  Neurologic - Patient is awake, alert, and oriented x3.  Cranial nerves II through XII are grossly intact.  Motor and sensory functions grossly intact.  Cerebellar function was normal.  Integument - There are no rashes.  There are no petechia or purpura lesions noted.  There are no vesicular lesions noted.           Procedures:  Procedures      Medical Decision Making:      Comorbidities that affect care:    Hypertension, hyperlipidemia, anxiety    External Notes reviewed:    Previous Clinic Note: Office visit with Jenna Lujan on 7/17/2024 was reviewed by me.      The following orders were placed and all results were independently analyzed by me:  Orders Placed This Encounter   Procedures    XR Hip With or Without Pelvis 2 - 3 View Left    XR Chest 1 View    Comprehensive Metabolic Panel    Protime-INR    aPTT    CBC Auto Differential    Hospitalist (on-call MD unless specified)    Orthopedics (on-call MD unless specified)    ECG 12 Lead Rhythm Change    ECG 12 Lead Pre-Op / Pre-Procedure    Insert  Peripheral IV    CBC & Differential       Medications Given in the Emergency Department:  Medications   sodium chloride 0.9 % flush 10 mL (has no administration in time range)        ED Course:    ED Course as of 07/29/24 1646   Mon Jul 29, 2024   1645 EKG performed at 1554 was interpreted by me to show a normal sinus rhythm with a ventricular rate of 97 bpm.  The MA interval is 146 ms.  P waves are normal.  QRS interval is normal.  Axis was at 4 degrees.  There is no acute ischemic ST or T wave change identified.  QT corrected was 470 ms. [TB]      ED Course User Index  [TB] Jhon Clinton DO       The patient was seen and evaluated the ED by me.  The above history and physical examination was performed as documented.  Diagnostic data was obtained.  Results reviewed.  Findings were discussed with the patient and family.  Dr. Farmer was consulted orthopedics.  Hospitalist service was consulted.  Dr. Espinosa will admit the patient.    Labs:    Lab Results (last 24 hours)       Procedure Component Value Units Date/Time    CBC & Differential [[id number]]  (Abnormal) Collected: 07/29/24 1557    Specimen: Blood Updated: 07/29/24 1604    Narrative:      The following orders were created for panel order CBC & Differential.  Procedure                               Abnormality         Status                     ---------                               -----------         ------                     CBC Auto Differential[[id number]]        Abnormal            Final result                 Please view results for these tests on the individual orders.    Comprehensive Metabolic Panel [[id number]]  (Abnormal) Collected: 07/29/24 1557    Specimen: Blood Updated: 07/29/24 1619     Glucose 133 mg/dL      BUN 22 mg/dL      Creatinine 0.78 mg/dL      Sodium 144 mmol/L      Potassium 4.0 mmol/L      Chloride 105 mmol/L      CO2 27.7 mmol/L      Calcium 9.4 mg/dL      Total Protein 6.1 g/dL      Albumin 3.8 g/dL      ALT (SGPT) 7 U/L      AST  (SGOT) 14 U/L      Alkaline Phosphatase 70 U/L      Total Bilirubin 0.5 mg/dL      Globulin 2.3 gm/dL      A/G Ratio 1.7 g/dL      BUN/Creatinine Ratio 28.2     Anion Gap 11.3 mmol/L      eGFR 75.0 mL/min/1.73     Narrative:      GFR Normal >60  Chronic Kidney Disease <60  Kidney Failure <15    The GFR formula is only valid for adults with stable renal function between ages 18 and 70.    Protime-INR [[id number]]  (Normal) Collected: 07/29/24 1557    Specimen: Blood Updated: 07/29/24 1615     Protime 14.8 Seconds      INR 1.13    Narrative:      Suggested Therapeutic Ranges For Oral Anticoagulant Therapy:  Level of Therapy                      INR Target Range  Standard Dose                            2.0-3.0  High Dose                                2.5-3.5  Patients not receiving anticoagulant  Therapy Normal Range                     0.86-1.15    aPTT [[id number]]  (Abnormal) Collected: 07/29/24 1557    Specimen: Blood Updated: 07/29/24 1615     PTT 29.7 seconds     CBC Auto Differential [[id number]]  (Abnormal) Collected: 07/29/24 1557    Specimen: Blood Updated: 07/29/24 1604     WBC 11.89 10*3/mm3      RBC 3.51 10*6/mm3      Hemoglobin 11.2 g/dL      Hematocrit 33.8 %      MCV 96.3 fL      MCH 31.9 pg      MCHC 33.1 g/dL      RDW 14.0 %      RDW-SD 49.4 fl      MPV 10.7 fL      Platelets 241 10*3/mm3      Neutrophil % 87.3 %      Lymphocyte % 6.4 %      Monocyte % 5.2 %      Eosinophil % 0.1 %      Basophil % 0.4 %      Immature Grans % 0.6 %      Neutrophils, Absolute 10.38 10*3/mm3      Lymphocytes, Absolute 0.76 10*3/mm3      Monocytes, Absolute 0.62 10*3/mm3      Eosinophils, Absolute 0.01 10*3/mm3      Basophils, Absolute 0.05 10*3/mm3      Immature Grans, Absolute 0.07 10*3/mm3      nRBC 0.0 /100 WBC              Imaging:    XR Chest 1 View    Result Date: 7/29/2024  XR CHEST 1 VW Date of Exam: 7/29/2024 4:02 PM EDT Indication: Preop, left hip fracture Comparison: February 11, 2020 FINDINGS: No definite  focal or diffuse pulmonary infiltrate is identified.  No pneumothorax or significant pleural effusion.  Heart size and mediastinal contour appear within normal limits. There is calcific atherosclerosis and mild tortuosity of the thoracic aorta.     No radiographic findings of acute cardiopulmonary abnormality. Electronically Signed: Lyle Corona  7/29/2024 4:26 PM EDT  Workstation ID: [id number]    XR Hip With or Without Pelvis 2 - 3 View Left    Result Date: 7/29/2024  XR HIP W OR WO PELVIS 2-3 VIEW LEFT Date of Exam: 7/29/2024 2:50 PM EDT Indication: fall/hip injury Comparison: None available. Findings: Comminuted fracture through the intertrochanteric region of the left femur with extension into the lesser trochanter and mild impaction. There is lateral and mild superior displacement of the distal femoral fragment. Diffuse osteopenia is identified.     Impression: 1. Comminuted intertrochanteric fracture of the left femoral neck with mild impaction. 2. Diffuse osteopenia. Electronically Signed: Yaniv Pal MD  7/29/2024 3:08 PM EDT  Workstation ID: [id number]       Differential Diagnosis and Discussion:    Extremity Pain: Differential diagnosis includes but is not limited to soft tissue sprain, tendonitis, tendon injury, dislocation, fracture, deep vein thrombosis, arterial insufficiency, osteoarthritis, bursitis, and ligamentous damage.  Orthopedic Injuries: Differential diagnosis includes but is not limited to fractures, soft tissue injuries, dislocations, contusions, ligamentous injuries, tendon injuries, nerve injuries, compartment syndrome, bursitis, and vascular injuries.    All labs were reviewed and interpreted by me.  All X-rays impressions were independently interpreted by me.  EKG was interpreted by me.    [facility]           Patient Care Considerations:    CT EXTREMITY: I considered ordering an extremity CT, however this can be performed an inpatient workup as deemed necessary.      Consultants/Shared  Management Plan:    Hospitalist: I have discussed the case with Dr. Espinosa who agrees to accept the patient for admission.  Consultant: I have discussed the case with Dr. Farmer who agrees to consult on the patient.    Social Determinants of Health:    Patient is independent, reliable, and has access to care.       Disposition and Care Coordination:    Admit:   Through independent evaluation of the patient's history, physical, and imperical data, the patient meets criteria for inpatient admission to the hospital.        Final diagnoses:   Closed intertrochanteric fracture of hip, left, initial encounter        ED Disposition       ED Disposition   Decision to Admit    Condition   --    Comment   --               This medical record created using voice recognition software.             Jhon Clinton DO  08/03/24 8100

## 2024-07-30 ENCOUNTER — ANESTHESIA EVENT (OUTPATIENT)
Dept: PERIOP | Facility: HOSPITAL | Age: 84
End: 2024-07-30
Payer: MEDICARE

## 2024-07-30 ENCOUNTER — APPOINTMENT (OUTPATIENT)
Dept: GENERAL RADIOLOGY | Facility: HOSPITAL | Age: 84
DRG: 481 | End: 2024-07-30
Payer: MEDICARE

## 2024-07-30 ENCOUNTER — TELEPHONE (OUTPATIENT)
Dept: ORTHOPEDIC SURGERY | Facility: CLINIC | Age: 84
End: 2024-07-30
Payer: MEDICARE

## 2024-07-30 ENCOUNTER — ANESTHESIA (OUTPATIENT)
Dept: PERIOP | Facility: HOSPITAL | Age: 84
End: 2024-07-30
Payer: MEDICARE

## 2024-07-30 ENCOUNTER — PREP FOR SURGERY (OUTPATIENT)
Dept: OTHER | Facility: HOSPITAL | Age: 84
End: 2024-07-30
Payer: MEDICARE

## 2024-07-30 DIAGNOSIS — S72.142A CLOSED INTERTROCHANTERIC FRACTURE OF HIP, LEFT, INITIAL ENCOUNTER: Primary | ICD-10-CM
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
## 2024-07-30 PROBLEM — J44.9 COPD (CHRONIC OBSTRUCTIVE PULMONARY DISEASE): Status: ACTIVE | Noted: 2024-07-30

## 2024-07-30 PROBLEM — R33.8 ACUTE URINARY RETENTION: Status: ACTIVE | Noted: 2024-07-30

## 2024-07-30 LAB
ANION GAP SERPL CALCULATED.3IONS-SCNC: 8.9 MMOL/L (ref 5–15)
BASOPHILS # BLD AUTO: 0.02 10*3/MM3 (ref 0–0.2)
BASOPHILS NFR BLD AUTO: 0.2 % (ref 0–1.5)
BUN SERPL-MCNC: 26 MG/DL (ref 8–23)
BUN/CREAT SERPL: 37.7 (ref 7–25)
CALCIUM SPEC-SCNC: 9.5 MG/DL (ref 8.6–10.5)
CHLORIDE SERPL-SCNC: 106 MMOL/L (ref 98–107)
CO2 SERPL-SCNC: 26.1 MMOL/L (ref 22–29)
CREAT SERPL-MCNC: 0.69 MG/DL (ref 0.57–1)
DEPRECATED RDW RBC AUTO: 49.5 FL (ref 37–54)
EGFRCR SERPLBLD CKD-EPI 2021: 85.7 ML/MIN/1.73
EOSINOPHIL # BLD AUTO: 0.02 10*3/MM3 (ref 0–0.4)
EOSINOPHIL NFR BLD AUTO: 0.2 % (ref 0.3–6.2)
ERYTHROCYTE [DISTWIDTH] IN BLOOD BY AUTOMATED COUNT: 14 % (ref 12.3–15.4)
GLUCOSE SERPL-MCNC: 100 MG/DL (ref 65–99)
HCT VFR BLD AUTO: 30.7 % (ref 34–46.6)
HGB BLD-MCNC: 9.8 G/DL (ref 12–15.9)
IMM GRANULOCYTES # BLD AUTO: 0.04 10*3/MM3 (ref 0–0.05)
IMM GRANULOCYTES NFR BLD AUTO: 0.5 % (ref 0–0.5)
LYMPHOCYTES # BLD AUTO: 1.36 10*3/MM3 (ref 0.7–3.1)
LYMPHOCYTES NFR BLD AUTO: 16 % (ref 19.6–45.3)
MAGNESIUM SERPL-MCNC: 1.9 MG/DL (ref 1.6–2.4)
MCH RBC QN AUTO: 31.2 PG (ref 26.6–33)
MCHC RBC AUTO-ENTMCNC: 31.9 G/DL (ref 31.5–35.7)
MCV RBC AUTO: 97.8 FL (ref 79–97)
MONOCYTES # BLD AUTO: 0.67 10*3/MM3 (ref 0.1–0.9)
MONOCYTES NFR BLD AUTO: 7.9 % (ref 5–12)
NEUTROPHILS NFR BLD AUTO: 6.41 10*3/MM3 (ref 1.7–7)
NEUTROPHILS NFR BLD AUTO: 75.2 % (ref 42.7–76)
NRBC BLD AUTO-RTO: 0 /100 WBC (ref 0–0.2)
PHOSPHATE SERPL-MCNC: 3.3 MG/DL (ref 2.5–4.5)
PLATELET # BLD AUTO: 214 10*3/MM3 (ref 140–450)
PMV BLD AUTO: 11.1 FL (ref 6–12)
POTASSIUM SERPL-SCNC: 3.9 MMOL/L (ref 3.5–5.2)
RBC # BLD AUTO: 3.14 10*6/MM3 (ref 3.77–5.28)
SODIUM SERPL-SCNC: 141 MMOL/L (ref 136–145)
TSH SERPL DL<=0.05 MIU/L-ACNC: 0.98 UIU/ML (ref 0.27–4.2)
WBC NRBC COR # BLD AUTO: 8.52 10*3/MM3 (ref 3.4–10.8)

## 2024-07-30 PROCEDURE — 25010000002 DEXAMETHASONE PER 1 MG: Performed by: NURSE ANESTHETIST, CERTIFIED REGISTERED

## 2024-07-30 PROCEDURE — 99221 1ST HOSP IP/OBS SF/LOW 40: CPT | Performed by: ORTHOPAEDIC SURGERY

## 2024-07-30 PROCEDURE — 25010000002 CEFAZOLIN PER 500 MG: Performed by: ORTHOPAEDIC SURGERY

## 2024-07-30 PROCEDURE — 27245 TREAT THIGH FRACTURE: CPT | Performed by: ORTHOPAEDIC SURGERY

## 2024-07-30 PROCEDURE — 25010000002 PROPOFOL 10 MG/ML EMULSION: Performed by: NURSE ANESTHETIST, CERTIFIED REGISTERED

## 2024-07-30 PROCEDURE — 83735 ASSAY OF MAGNESIUM: CPT | Performed by: INTERNAL MEDICINE

## 2024-07-30 PROCEDURE — 84100 ASSAY OF PHOSPHORUS: CPT | Performed by: INTERNAL MEDICINE

## 2024-07-30 PROCEDURE — 25810000003 LACTATED RINGERS PER 1000 ML: Performed by: PHYSICIAN ASSISTANT

## 2024-07-30 PROCEDURE — 94799 UNLISTED PULMONARY SVC/PX: CPT

## 2024-07-30 PROCEDURE — 76000 FLUOROSCOPY <1 HR PHYS/QHP: CPT

## 2024-07-30 PROCEDURE — 85025 COMPLETE CBC W/AUTO DIFF WBC: CPT | Performed by: INTERNAL MEDICINE

## 2024-07-30 PROCEDURE — 80048 BASIC METABOLIC PNL TOTAL CA: CPT | Performed by: INTERNAL MEDICINE

## 2024-07-30 PROCEDURE — 25810000003 LACTATED RINGERS PER 1000 ML: Performed by: INTERNAL MEDICINE

## 2024-07-30 PROCEDURE — C1713 ANCHOR/SCREW BN/BN,TIS/BN: HCPCS | Performed by: ORTHOPAEDIC SURGERY

## 2024-07-30 PROCEDURE — 99233 SBSQ HOSP IP/OBS HIGH 50: CPT | Performed by: INTERNAL MEDICINE

## 2024-07-30 PROCEDURE — 25010000002 ONDANSETRON PER 1 MG: Performed by: NURSE ANESTHETIST, CERTIFIED REGISTERED

## 2024-07-30 PROCEDURE — 0QS706Z REPOSITION LEFT UPPER FEMUR WITH INTRAMEDULLARY INTERNAL FIXATION DEVICE, OPEN APPROACH: ICD-10-PCS | Performed by: ORTHOPAEDIC SURGERY

## 2024-07-30 PROCEDURE — 94761 N-INVAS EAR/PLS OXIMETRY MLT: CPT

## 2024-07-30 PROCEDURE — 84443 ASSAY THYROID STIM HORMONE: CPT | Performed by: INTERNAL MEDICINE

## 2024-07-30 PROCEDURE — 25010000002 FENTANYL CITRATE (PF) 50 MCG/ML SOLUTION: Performed by: NURSE ANESTHETIST, CERTIFIED REGISTERED

## 2024-07-30 PROCEDURE — 25010000002 HYDROMORPHONE 1 MG/ML SOLUTION: Performed by: INTERNAL MEDICINE

## 2024-07-30 PROCEDURE — 25810000003 SODIUM CHLORIDE 0.9 % SOLUTION: Performed by: ORTHOPAEDIC SURGERY

## 2024-07-30 PROCEDURE — C1769 GUIDE WIRE: HCPCS | Performed by: ORTHOPAEDIC SURGERY

## 2024-07-30 DEVICE — ZNN CMN LAG SCREW 10.5X85
Type: IMPLANTABLE DEVICE | Site: HIP | Status: FUNCTIONAL
Brand: ZIMMER® NATURAL NAIL® SYSTEM

## 2024-07-30 DEVICE — SCRW CORT FA FUL/THRD HEX3.5 TI 5X27.5MM: Type: IMPLANTABLE DEVICE | Site: HIP | Status: FUNCTIONAL

## 2024-07-30 DEVICE — ZNN CMN NAIL 11.5MMX21.5CM 125L
Type: IMPLANTABLE DEVICE | Site: HIP | Status: FUNCTIONAL
Brand: ZIMMER® NATURAL NAIL® SYSTEM

## 2024-07-30 RX ORDER — ACETAMINOPHEN 325 MG/1
650 TABLET ORAL EVERY 6 HOURS PRN
Status: DISCONTINUED | OUTPATIENT
Start: 2024-07-30 | End: 2024-08-06 | Stop reason: HOSPADM

## 2024-07-30 RX ORDER — AMOXICILLIN 250 MG
2 CAPSULE ORAL 2 TIMES DAILY
Status: DISCONTINUED | OUTPATIENT
Start: 2024-07-30 | End: 2024-08-06 | Stop reason: HOSPADM

## 2024-07-30 RX ORDER — ALUMINA, MAGNESIA, AND SIMETHICONE 2400; 2400; 240 MG/30ML; MG/30ML; MG/30ML
15 SUSPENSION ORAL EVERY 6 HOURS PRN
Status: DISCONTINUED | OUTPATIENT
Start: 2024-07-30 | End: 2024-08-06 | Stop reason: HOSPADM

## 2024-07-30 RX ORDER — ONDANSETRON 4 MG/1
4 TABLET, ORALLY DISINTEGRATING ORAL EVERY 6 HOURS PRN
Status: DISCONTINUED | OUTPATIENT
Start: 2024-07-30 | End: 2024-08-06 | Stop reason: HOSPADM

## 2024-07-30 RX ORDER — PROCHLORPERAZINE EDISYLATE 5 MG/ML
5 INJECTION INTRAMUSCULAR; INTRAVENOUS EVERY 6 HOURS PRN
Status: DISCONTINUED | OUTPATIENT
Start: 2024-07-30 | End: 2024-08-06 | Stop reason: HOSPADM

## 2024-07-30 RX ORDER — POLYETHYLENE GLYCOL 3350 17 G/17G
17 POWDER, FOR SOLUTION ORAL DAILY PRN
Status: DISCONTINUED | OUTPATIENT
Start: 2024-07-30 | End: 2024-08-06 | Stop reason: HOSPADM

## 2024-07-30 RX ORDER — ONDANSETRON 2 MG/ML
4 INJECTION INTRAMUSCULAR; INTRAVENOUS ONCE AS NEEDED
Status: DISCONTINUED | OUTPATIENT
Start: 2024-07-30 | End: 2024-07-30

## 2024-07-30 RX ORDER — SODIUM CHLORIDE, SODIUM LACTATE, POTASSIUM CHLORIDE, CALCIUM CHLORIDE 600; 310; 30; 20 MG/100ML; MG/100ML; MG/100ML; MG/100ML
9 INJECTION, SOLUTION INTRAVENOUS CONTINUOUS PRN
Status: DISCONTINUED | OUTPATIENT
Start: 2024-07-30 | End: 2024-08-06 | Stop reason: HOSPADM

## 2024-07-30 RX ORDER — DEXTROSE MONOHYDRATE, SODIUM CHLORIDE, AND POTASSIUM CHLORIDE 50; 1.49; 9 G/1000ML; G/1000ML; G/1000ML
75 INJECTION, SOLUTION INTRAVENOUS CONTINUOUS
Status: DISCONTINUED | OUTPATIENT
Start: 2024-07-30 | End: 2024-08-01

## 2024-07-30 RX ORDER — SODIUM CHLORIDE 9 MG/ML
100 INJECTION, SOLUTION INTRAVENOUS CONTINUOUS
Status: DISCONTINUED | OUTPATIENT
Start: 2024-07-30 | End: 2024-08-01

## 2024-07-30 RX ORDER — PROMETHAZINE HYDROCHLORIDE 12.5 MG/1
25 TABLET ORAL ONCE AS NEEDED
Status: DISCONTINUED | OUTPATIENT
Start: 2024-07-30 | End: 2024-07-30

## 2024-07-30 RX ORDER — LIDOCAINE 4 G/G
1 PATCH TOPICAL DAILY PRN
Status: DISCONTINUED | OUTPATIENT
Start: 2024-07-30 | End: 2024-08-06 | Stop reason: HOSPADM

## 2024-07-30 RX ORDER — PROMETHAZINE HYDROCHLORIDE 25 MG/1
25 SUPPOSITORY RECTAL ONCE AS NEEDED
Status: DISCONTINUED | OUTPATIENT
Start: 2024-07-30 | End: 2024-07-30

## 2024-07-30 RX ORDER — HYDROXYZINE HYDROCHLORIDE 25 MG/1
25 TABLET, FILM COATED ORAL 3 TIMES DAILY PRN
Status: DISCONTINUED | OUTPATIENT
Start: 2024-07-30 | End: 2024-08-06 | Stop reason: HOSPADM

## 2024-07-30 RX ORDER — ACETAMINOPHEN 500 MG
1000 TABLET ORAL 3 TIMES DAILY
Status: DISPENSED | OUTPATIENT
Start: 2024-07-30 | End: 2024-08-04

## 2024-07-30 RX ORDER — BISACODYL 5 MG/1
5 TABLET, DELAYED RELEASE ORAL DAILY PRN
Status: DISCONTINUED | OUTPATIENT
Start: 2024-07-30 | End: 2024-08-06 | Stop reason: HOSPADM

## 2024-07-30 RX ORDER — SODIUM CHLORIDE 0.9 % (FLUSH) 0.9 %
10 SYRINGE (ML) INJECTION AS NEEDED
Status: DISCONTINUED | OUTPATIENT
Start: 2024-07-30 | End: 2024-08-06 | Stop reason: HOSPADM

## 2024-07-30 RX ORDER — DEXAMETHASONE SODIUM PHOSPHATE 4 MG/ML
INJECTION, SOLUTION INTRA-ARTICULAR; INTRALESIONAL; INTRAMUSCULAR; INTRAVENOUS; SOFT TISSUE AS NEEDED
Status: DISCONTINUED | OUTPATIENT
Start: 2024-07-30 | End: 2024-07-30 | Stop reason: SURG

## 2024-07-30 RX ORDER — ONDANSETRON 2 MG/ML
4 INJECTION INTRAMUSCULAR; INTRAVENOUS EVERY 6 HOURS PRN
Status: DISCONTINUED | OUTPATIENT
Start: 2024-07-30 | End: 2024-08-02 | Stop reason: SDUPTHER

## 2024-07-30 RX ORDER — SODIUM CHLORIDE, SODIUM LACTATE, POTASSIUM CHLORIDE, CALCIUM CHLORIDE 600; 310; 30; 20 MG/100ML; MG/100ML; MG/100ML; MG/100ML
75 INJECTION, SOLUTION INTRAVENOUS CONTINUOUS
Status: ACTIVE | OUTPATIENT
Start: 2024-07-30 | End: 2024-07-30

## 2024-07-30 RX ORDER — ENOXAPARIN SODIUM 100 MG/ML
30 INJECTION SUBCUTANEOUS DAILY
Status: DISCONTINUED | OUTPATIENT
Start: 2024-07-30 | End: 2024-07-30

## 2024-07-30 RX ORDER — NALOXONE HCL 0.4 MG/ML
0.4 VIAL (ML) INJECTION
Status: DISCONTINUED | OUTPATIENT
Start: 2024-07-30 | End: 2024-08-06 | Stop reason: HOSPADM

## 2024-07-30 RX ORDER — IPRATROPIUM BROMIDE AND ALBUTEROL SULFATE 2.5; .5 MG/3ML; MG/3ML
3 SOLUTION RESPIRATORY (INHALATION) ONCE
Status: DISCONTINUED | OUTPATIENT
Start: 2024-07-30 | End: 2024-07-30 | Stop reason: HOSPADM

## 2024-07-30 RX ORDER — TAMSULOSIN HYDROCHLORIDE 0.4 MG/1
0.4 CAPSULE ORAL NIGHTLY
Status: DISCONTINUED | OUTPATIENT
Start: 2024-07-30 | End: 2024-08-06 | Stop reason: HOSPADM

## 2024-07-30 RX ORDER — OXYCODONE HYDROCHLORIDE 5 MG/1
5 TABLET ORAL
Status: DISCONTINUED | OUTPATIENT
Start: 2024-07-30 | End: 2024-07-30

## 2024-07-30 RX ORDER — ONDANSETRON 2 MG/ML
INJECTION INTRAMUSCULAR; INTRAVENOUS AS NEEDED
Status: DISCONTINUED | OUTPATIENT
Start: 2024-07-30 | End: 2024-07-30 | Stop reason: SURG

## 2024-07-30 RX ORDER — ARFORMOTEROL TARTRATE 15 UG/2ML
15 SOLUTION RESPIRATORY (INHALATION)
Status: DISCONTINUED | OUTPATIENT
Start: 2024-07-30 | End: 2024-08-06 | Stop reason: HOSPADM

## 2024-07-30 RX ORDER — SODIUM CHLORIDE 9 MG/ML
40 INJECTION, SOLUTION INTRAVENOUS AS NEEDED
Status: DISCONTINUED | OUTPATIENT
Start: 2024-07-30 | End: 2024-08-06 | Stop reason: HOSPADM

## 2024-07-30 RX ORDER — BETHANECHOL CHLORIDE 10 MG/1
10 TABLET ORAL 3 TIMES DAILY
Status: DISCONTINUED | OUTPATIENT
Start: 2024-07-30 | End: 2024-08-02

## 2024-07-30 RX ORDER — AMOXICILLIN 250 MG
2 CAPSULE ORAL 2 TIMES DAILY
Status: DISCONTINUED | OUTPATIENT
Start: 2024-07-30 | End: 2024-08-02 | Stop reason: SDUPTHER

## 2024-07-30 RX ORDER — ENOXAPARIN SODIUM 100 MG/ML
30 INJECTION SUBCUTANEOUS DAILY
Status: DISCONTINUED | OUTPATIENT
Start: 2024-07-31 | End: 2024-08-06

## 2024-07-30 RX ORDER — MORPHINE SULFATE 2 MG/ML
1 INJECTION, SOLUTION INTRAMUSCULAR; INTRAVENOUS EVERY 4 HOURS PRN
Status: ACTIVE | OUTPATIENT
Start: 2024-07-30 | End: 2024-08-06

## 2024-07-30 RX ORDER — PROPOFOL 10 MG/ML
VIAL (ML) INTRAVENOUS AS NEEDED
Status: DISCONTINUED | OUTPATIENT
Start: 2024-07-30 | End: 2024-07-30 | Stop reason: SURG

## 2024-07-30 RX ORDER — LIDOCAINE HYDROCHLORIDE 20 MG/ML
INJECTION, SOLUTION EPIDURAL; INFILTRATION; INTRACAUDAL; PERINEURAL AS NEEDED
Status: DISCONTINUED | OUTPATIENT
Start: 2024-07-30 | End: 2024-07-30 | Stop reason: SURG

## 2024-07-30 RX ORDER — UREA 10 %
1000 LOTION (ML) TOPICAL DAILY
Status: DISCONTINUED | OUTPATIENT
Start: 2024-07-31 | End: 2024-08-06 | Stop reason: HOSPADM

## 2024-07-30 RX ORDER — ACETAMINOPHEN 500 MG
1000 TABLET ORAL 3 TIMES DAILY
Status: DISCONTINUED | OUTPATIENT
Start: 2024-07-30 | End: 2024-07-30

## 2024-07-30 RX ORDER — TRAMADOL HYDROCHLORIDE 50 MG/1
50 TABLET ORAL EVERY 6 HOURS PRN
Status: DISCONTINUED | OUTPATIENT
Start: 2024-07-30 | End: 2024-08-06 | Stop reason: HOSPADM

## 2024-07-30 RX ORDER — FENTANYL CITRATE 50 UG/ML
INJECTION, SOLUTION INTRAMUSCULAR; INTRAVENOUS AS NEEDED
Status: DISCONTINUED | OUTPATIENT
Start: 2024-07-30 | End: 2024-07-30 | Stop reason: SURG

## 2024-07-30 RX ORDER — PHENYLEPHRINE HCL IN 0.9% NACL 1 MG/10 ML
SYRINGE (ML) INTRAVENOUS AS NEEDED
Status: DISCONTINUED | OUTPATIENT
Start: 2024-07-30 | End: 2024-07-30 | Stop reason: SURG

## 2024-07-30 RX ORDER — SODIUM CHLORIDE 0.9 % (FLUSH) 0.9 %
10 SYRINGE (ML) INJECTION EVERY 12 HOURS SCHEDULED
Status: DISCONTINUED | OUTPATIENT
Start: 2024-07-30 | End: 2024-08-06 | Stop reason: HOSPADM

## 2024-07-30 RX ORDER — BISACODYL 10 MG
10 SUPPOSITORY, RECTAL RECTAL DAILY PRN
Status: DISCONTINUED | OUTPATIENT
Start: 2024-07-30 | End: 2024-08-06 | Stop reason: HOSPADM

## 2024-07-30 RX ORDER — ONDANSETRON 2 MG/ML
4 INJECTION INTRAMUSCULAR; INTRAVENOUS EVERY 4 HOURS PRN
Status: DISCONTINUED | OUTPATIENT
Start: 2024-07-30 | End: 2024-08-06 | Stop reason: HOSPADM

## 2024-07-30 RX ORDER — POLYETHYLENE GLYCOL 3350 17 G/17G
17 POWDER, FOR SOLUTION ORAL 2 TIMES DAILY PRN
Status: DISCONTINUED | OUTPATIENT
Start: 2024-07-30 | End: 2024-08-02 | Stop reason: SDUPTHER

## 2024-07-30 RX ORDER — ACETAMINOPHEN 500 MG
1000 TABLET ORAL ONCE
Status: COMPLETED | OUTPATIENT
Start: 2024-07-30 | End: 2024-07-30

## 2024-07-30 RX ADMIN — HYDROMORPHONE HYDROCHLORIDE 0.5 MG: 1 INJECTION, SOLUTION INTRAMUSCULAR; INTRAVENOUS; SUBCUTANEOUS at 14:34

## 2024-07-30 RX ADMIN — ARFORMOTEROL TARTRATE 15 MCG: 15 SOLUTION RESPIRATORY (INHALATION) at 20:25

## 2024-07-30 RX ADMIN — SODIUM CHLORIDE 2 G: 9 INJECTION, SOLUTION INTRAVENOUS at 16:34

## 2024-07-30 RX ADMIN — FENTANYL CITRATE 25 MCG: 50 INJECTION, SOLUTION INTRAMUSCULAR; INTRAVENOUS at 16:37

## 2024-07-30 RX ADMIN — BUDESONIDE 0.5 MG: 0.5 INHALANT ORAL at 09:53

## 2024-07-30 RX ADMIN — Medication 100 MCG: at 17:09

## 2024-07-30 RX ADMIN — BUDESONIDE 0.5 MG: 0.5 INHALANT ORAL at 20:25

## 2024-07-30 RX ADMIN — ARFORMOTEROL TARTRATE 15 MCG: 15 SOLUTION RESPIRATORY (INHALATION) at 09:53

## 2024-07-30 RX ADMIN — PROPOFOL 20 MG: 10 INJECTION, EMULSION INTRAVENOUS at 17:16

## 2024-07-30 RX ADMIN — LIDOCAINE HYDROCHLORIDE 80 MG: 20 INJECTION, SOLUTION INTRAVENOUS at 16:36

## 2024-07-30 RX ADMIN — SODIUM CHLORIDE, POTASSIUM CHLORIDE, SODIUM LACTATE AND CALCIUM CHLORIDE 75 ML/HR: 600; 310; 30; 20 INJECTION, SOLUTION INTRAVENOUS at 14:54

## 2024-07-30 RX ADMIN — FENTANYL CITRATE 50 MCG: 50 INJECTION, SOLUTION INTRAMUSCULAR; INTRAVENOUS at 16:48

## 2024-07-30 RX ADMIN — TRAMADOL HYDROCHLORIDE 50 MG: 50 TABLET ORAL at 22:00

## 2024-07-30 RX ADMIN — SODIUM CHLORIDE 100 ML/HR: 9 INJECTION, SOLUTION INTRAVENOUS at 18:41

## 2024-07-30 RX ADMIN — SENNOSIDES AND DOCUSATE SODIUM 2 TABLET: 50; 8.6 TABLET ORAL at 21:59

## 2024-07-30 RX ADMIN — ONDANSETRON HYDROCHLORIDE 4 MG: 2 SOLUTION INTRAMUSCULAR; INTRAVENOUS at 17:12

## 2024-07-30 RX ADMIN — Medication 150 MCG: at 17:27

## 2024-07-30 RX ADMIN — DEXAMETHASONE SODIUM PHOSPHATE 4 MG: 4 INJECTION, SOLUTION INTRAMUSCULAR; INTRAVENOUS at 16:37

## 2024-07-30 RX ADMIN — HYDROMORPHONE HYDROCHLORIDE 0.5 MG: 1 INJECTION, SOLUTION INTRAMUSCULAR; INTRAVENOUS; SUBCUTANEOUS at 09:09

## 2024-07-30 RX ADMIN — BETHANECHOL CHLORIDE 10 MG: 10 TABLET ORAL at 22:01

## 2024-07-30 RX ADMIN — PROPOFOL 80 MG: 10 INJECTION, EMULSION INTRAVENOUS at 16:36

## 2024-07-30 RX ADMIN — PRAVASTATIN SODIUM 40 MG: 20 TABLET ORAL at 21:59

## 2024-07-30 RX ADMIN — DILTIAZEM HYDROCHLORIDE 90 MG: 60 TABLET, FILM COATED ORAL at 14:34

## 2024-07-30 RX ADMIN — SODIUM CHLORIDE, POTASSIUM CHLORIDE, SODIUM LACTATE AND CALCIUM CHLORIDE 75 ML/HR: 600; 310; 30; 20 INJECTION, SOLUTION INTRAVENOUS at 01:57

## 2024-07-30 RX ADMIN — TAMSULOSIN HYDROCHLORIDE 0.4 MG: 0.4 CAPSULE ORAL at 22:00

## 2024-07-30 RX ADMIN — Medication 10 ML: at 22:02

## 2024-07-30 RX ADMIN — POTASSIUM CHLORIDE, DEXTROSE MONOHYDRATE AND SODIUM CHLORIDE 75 ML/HR: 150; 5; 900 INJECTION, SOLUTION INTRAVENOUS at 22:00

## 2024-07-30 RX ADMIN — FENTANYL CITRATE 25 MCG: 50 INJECTION, SOLUTION INTRAMUSCULAR; INTRAVENOUS at 16:55

## 2024-07-30 RX ADMIN — ACETAMINOPHEN 1000 MG: 500 TABLET ORAL at 14:34

## 2024-07-30 NOTE — TELEPHONE ENCOUNTER
Patient is having left hip surgery today by DR Farmer and [facility] please submit order for Xray to be taken if necessary outside of [facility] before 8-10 follow up.

## 2024-07-30 NOTE — TELEPHONE ENCOUNTER
PATIENT BEING FOLLOWED ALREADY BY DENY MCCORD. XRAY WILL BE ORDERED PRIOR TO APPT PER CURRENT WORKFLOW.

## 2024-07-30 NOTE — OP NOTE
HIP INTERTROCHANTERIC NAILING  Procedure Report    Patient Name:  Danielle Granda  YOB: 1940    Date of Surgery:  7/30/2024       Pre-op Diagnosis:   Closed intertrochanteric fracture of hip, left, initial encounter [S72.142A]       Post-Op Diagnosis Codes:     * Closed intertrochanteric fracture of hip, left, initial encounter [S72.142A]    Procedure/CPT® Codes:      Procedure(s):  Left HIP INTERTROCHANTERIC NAILING    Staff:  Surgeon(s):  Dutch Farmer MD    Assistant: Eryn Miller    Anesthesia: General    Estimated Blood Loss: minimal    Implants:    Implant Name Type Inv. Item Serial No.  Lot No. LRB No. Used Action   NAIL IM/FEM CEPH PARAS TI 21.5CM 11.5MM 125DEG LT - [id number] Implant NAIL IM/FEM CEPH PARAS TI 21.5CM 11.5MM 125DEG LT  [employer] [id number] Left 1 Implanted   SCRW LAG CEPH TI 10.5X85MM - [id number] Implant SCRW LAG CEPH TI 10.5X85MM  [employer] [id number] Left 1 Implanted   SCRW UMANG FA FUL/THRD HEX3.5 TI 5X27.5MM - [id number] Implant SCRW UMANG FA FUL/THRD HEX3.5 TI 5X27.5MM  [employer] [id number] Left 1 Implanted       Specimen:          None      Complications: none    Description of Procedure: See H&P for risks and benefits. The patient was taken to the operating room and placed supine on the operating table.  After general anesthesia was established, the patient was placed on a fracture table.  After gentle retraction was placed on the lower extremity, C-arm shot showed anatomic alignment of the fracture on AP and lateral views.  The left hip and lower extremity were prepped and draped in a standard fashion using alcohol and ChloraPrep.  A standard incision was made just proximal to the greater trochanter approximately 4 cm in length and carried down to the subcutaneous tissue with a knife.  Dissection was carried down creating a soft tissue portal using curved Calvillo scissors, and then the curved awl was used to gain a starting point.  The guidewire was  passed down across the fracture site, and the one step reamer was used followed by passage of the 125 degree angle CM nail.  The guidewire was removed.  After seating it in appropriate position, the CM screw was placed using the guide with a separate stab incision and drilling the guide pin in and ensuring it was in appropriate position and reaming appropriate depth.  The appropriate length CM screw was placed.  The set screw was tightened, and then the static screw was filled in a standard fashion in the distal interlocking hole after creating a separate stab incision also using the guide.  The guide was removed after C-arm shots verified anatomic reduction and fixation of the fracture and excellent placement of the implants.  No complication.  All three wounds were copiously irrigated.  The deep fascia was closed with 0 Vicryl proximally.  The subcutaneous was all closed with 2-0 Vicryl.  The skin was all closed with staples.  Incisions were washed and dried and sterile dressing applied.  The patient tolerated the procedure well and was taken to the recovery room.         Dutch Farmer MD     Date: 7/30/2024  Time: 17:30 EDT

## 2024-07-30 NOTE — CONSULTS
[facility]   Consult Note    Patient Name: Danielle Granda  : 1940  MRN: [id number]  Primary Care Physician:  Provider, No Known  Referring Physician: No ref. provider found  Date of admission: 2024    Subjective   Subjective     Reason for Consult/ Chief Complaint: Left hip fracture    HPI:  Danielle Granda is a 84 y.o. female had a low-energy fall on her left side and sustained a left intertrochanteric fracture of the femur.  She was admitted by hospitalist and I was consulted and she was cleared for surgical intervention    Review of Systems   14 Point ROS is negative except as noted above.     Personal History     Past Medical History:   Diagnosis Date   • Anxiety    • Hyperlipidemia    • Hypertension        Past Surgical History:   Procedure Laterality Date   • HYSTERECTOMY         Family History: family history is not on file. Otherwise pertinent FHx was reviewed and not pertinent to current issue.    Social History:  reports that she has been smoking cigarettes. She has never used smokeless tobacco. She reports that she does not drink alcohol and does not use drugs.    Home Medications:  Cyanocobalamin, aspirin, budesonide, cholecalciferol, dilTIAZem, levalbuterol, pravastatin, sertraline, and traMADol    Allergies:  No Known Allergies    Objective    Objective     Vitals:   Temp:  [98.1 °F (36.7 °C)-98.7 °F (37.1 °C)] 98.7 °F (37.1 °C)  Heart Rate:  [] 85  Resp:  [16-18] 16  BP: (102-147)/(49-84) 140/74  Flow (L/min):  [2] 2    Physical Exam:   Constitutional: Awake, alert   HENT: Atraumatic, Normocephalic   Respiratory: Nonlabored respirations    Cardiovascular: Intact peripheral pulses    Musculoskeletal: Clavicles shoulders elbows wrist and hand are nontender full range of motion 2+ radial pulse bilateral upper extremity left lower extremity shortened externally rotated compared to the right right hip knee and ankles nontender left knee and ankles nontender calves are soft no  signs of DVT     Imaging:  Imaging Results (Last 24 Hours)       Procedure Component Value Units Date/Time    XR Chest 1 View [[id number]] Collected: 07/29/24 1624     Updated: 07/29/24 1628    Narrative:        XR CHEST 1 VW    Date of Exam: 7/29/2024 4:02 PM EDT    Indication: Preop, left hip fracture    Comparison: February 11, 2020    FINDINGS:  No definite focal or diffuse pulmonary infiltrate is identified.  No pneumothorax or significant pleural effusion.  Heart size and mediastinal contour appear within normal limits. There is calcific atherosclerosis and mild tortuosity of the thoracic   aorta.      Impression:      No radiographic findings of acute cardiopulmonary abnormality.      Electronically Signed: Lyle Corona    7/29/2024 4:26 PM EDT    Workstation ID: [id number]    XR Hip With or Without Pelvis 2 - 3 View Left [[id number]] Collected: 07/29/24 1504     Updated: 07/29/24 1511    Narrative:      XR HIP W OR WO PELVIS 2-3 VIEW LEFT    Date of Exam: 7/29/2024 2:50 PM EDT    Indication: fall/hip injury    Comparison: None available.    Findings:  Comminuted fracture through the intertrochanteric region of the left femur with extension into the lesser trochanter and mild impaction. There is lateral and mild superior displacement of the distal femoral fragment. Diffuse osteopenia is identified.      Impression:      Impression:    1. Comminuted intertrochanteric fracture of the left femoral neck with mild impaction.  2. Diffuse osteopenia.      Electronically Signed: Yaniv Pal MD    7/29/2024 3:08 PM EDT    Workstation ID: [id number]             Result Review    Result Review:  I have personally reviewed the results from the time of this admission to 7/30/2024 15:04 EDT and agree with these findings:  []  Laboratory  []  Microbiology  []  Radiology  []  EKG/Telemetry   []  Cardiology/Vascular   []  Pathology  []  Old records  []  Other:      Assessment & Plan   Assessment / Plan     Brief Patient  Summary:  Danielle Granda is a 84 y.o. female who sustained an intertrochanteric fracture of the left femur    Active Hospital Problems:  Active Hospital Problems    Diagnosis    • **Fracture    • Closed intertrochanteric fracture of hip, left, initial encounter    • Intertrochanteric fracture of left femur        Plan: Discussed risk minutes proceeding with reduction fixation bleeding infection death blood clots lung problems heart attacks possible need for future surgery possible damage neurovascular structures no guarantees given among others she wished to proceed.      Electronically signed by Dutch Farmer MD, 07/30/24, 3:04 PM EDT.

## 2024-07-30 NOTE — PLAN OF CARE
Goal Outcome Evaluation:  Plan of Care Reviewed With: patient        Progress: no change  Outcome Evaluation: Pt alert with confusion, able to make needs known. Pain controlled with ice pack and PRN Dilaudid,Tramadol-tolerated well. Pt with purewick in place. Pt NPO in preparation of surgical repair to left femur fracture.

## 2024-07-30 NOTE — ANESTHESIA POSTPROCEDURE EVALUATION
Patient: Danielle Granda    Procedure Summary       Date: 07/30/24 Room / Location: [facility] OR 01 / [facility] MAIN OR    Anesthesia Start: 1633 Anesthesia Stop: 1730    Procedure: HIP INTERTROCHANTERIC NAILING (Left: Hip) Diagnosis:       Closed intertrochanteric fracture of hip, left, initial encounter      (Closed intertrochanteric fracture of hip, left, initial encounter [S72.142A])    Surgeons: Dutch Farmer MD Provider: Yaniv Tinoco MD    Anesthesia Type: general ASA Status: 3            Anesthesia Type: general    Vitals  Vitals Value Taken Time   /60 07/30/24 1825   Temp 36.3 °C (97.3 °F) 07/30/24 1804   Pulse 77 07/30/24 1825   Resp 16 07/30/24 1804   SpO2 91 % 07/30/24 1825   Vitals shown include unfiled device data.        Post Anesthesia Care and Evaluation    Patient location during evaluation: bedside  Patient participation: complete - patient participated  Level of consciousness: awake  Pain management: adequate    Airway patency: patent  PONV Status: none  Cardiovascular status: acceptable and stable  Respiratory status: acceptable  Hydration status: acceptable

## 2024-07-30 NOTE — PLAN OF CARE
Goal Outcome Evaluation:   Patient has had no new changes throughout shift and continues to remain stable. Patient had left fixation and reduction performed this evening by Dr. Farmer. Patient has had complaints of pain during shift that has been controlled with prn medication. Patient remains on bedrest. Patient is on 2 L NC with . Patient has been A and O x4 during shift.

## 2024-07-30 NOTE — ANESTHESIA PREPROCEDURE EVALUATION
Anesthesia Evaluation     Patient summary reviewed and Nursing notes reviewed   no history of anesthetic complications:   NPO Solid Status: > 8 hours  NPO Liquid Status: > 2 hours           Airway   Mallampati: I  TM distance: >3 FB  Neck ROM: full  No difficulty expected  Dental    (+) edentulous    Pulmonary - normal exam    breath sounds clear to auscultation  (+) a smoker Current, Abstained day of surgery, cigarettes,  Cardiovascular - normal exam  Exercise tolerance: good (4-7 METS)    Rhythm: regular  Rate: normal    (+) hypertension less than 2 medications, hyperlipidemia      Neuro/Psych  (+) psychiatric history  GI/Hepatic/Renal/Endo - negative ROS     Musculoskeletal (-) negative ROS    Abdominal  - normal exam   Substance History - negative use     OB/GYN negative ob/gyn ROS         Other - negative ROS       ROS/Med Hx Other: PAT Nursing Notes unavailable.                     Anesthesia Plan    ASA 3     general     (Duoneb ordered)  intravenous induction     Anesthetic plan, risks, benefits, and alternatives have been provided, discussed and informed consent has been obtained with: patient.    Use of blood products discussed with patient .    Plan discussed with CRNA.        CODE STATUS:    Level Of Support Discussed With: Patient  Code Status (Patient has no pulse and is not breathing): CPR (Attempt to Resuscitate)  Medical Interventions (Patient has pulse or is breathing): Full Support

## 2024-07-31 LAB
ANION GAP SERPL CALCULATED.3IONS-SCNC: 9.6 MMOL/L (ref 5–15)
BASOPHILS # BLD AUTO: 0.02 10*3/MM3 (ref 0–0.2)
BASOPHILS NFR BLD AUTO: 0.3 % (ref 0–1.5)
BUN SERPL-MCNC: 26 MG/DL (ref 8–23)
BUN/CREAT SERPL: 41.3 (ref 7–25)
CALCIUM SPEC-SCNC: 8.9 MG/DL (ref 8.6–10.5)
CHLORIDE SERPL-SCNC: 106 MMOL/L (ref 98–107)
CO2 SERPL-SCNC: 23.4 MMOL/L (ref 22–29)
CREAT SERPL-MCNC: 0.63 MG/DL (ref 0.57–1)
DEPRECATED RDW RBC AUTO: 52.7 FL (ref 37–54)
EGFRCR SERPLBLD CKD-EPI 2021: 87.6 ML/MIN/1.73
EOSINOPHIL # BLD AUTO: 0 10*3/MM3 (ref 0–0.4)
EOSINOPHIL NFR BLD AUTO: 0 % (ref 0.3–6.2)
ERYTHROCYTE [DISTWIDTH] IN BLOOD BY AUTOMATED COUNT: 13.9 % (ref 12.3–15.4)
FERRITIN SERPL-MCNC: 543.2 NG/ML (ref 13–150)
GLUCOSE SERPL-MCNC: 200 MG/DL (ref 65–99)
HCT VFR BLD AUTO: 26.1 % (ref 34–46.6)
HGB BLD-MCNC: 8.2 G/DL (ref 12–15.9)
IMM GRANULOCYTES # BLD AUTO: 0.04 10*3/MM3 (ref 0–0.05)
IMM GRANULOCYTES NFR BLD AUTO: 0.6 % (ref 0–0.5)
IRON 24H UR-MRATE: 19 MCG/DL (ref 37–145)
IRON SATN MFR SERPL: 11 % (ref 20–50)
LYMPHOCYTES # BLD AUTO: 0.38 10*3/MM3 (ref 0.7–3.1)
LYMPHOCYTES NFR BLD AUTO: 5.4 % (ref 19.6–45.3)
MAGNESIUM SERPL-MCNC: 1.9 MG/DL (ref 1.6–2.4)
MCH RBC QN AUTO: 32.5 PG (ref 26.6–33)
MCHC RBC AUTO-ENTMCNC: 31.4 G/DL (ref 31.5–35.7)
MCV RBC AUTO: 103.6 FL (ref 79–97)
MONOCYTES # BLD AUTO: 0.37 10*3/MM3 (ref 0.1–0.9)
MONOCYTES NFR BLD AUTO: 5.3 % (ref 5–12)
NEUTROPHILS NFR BLD AUTO: 6.21 10*3/MM3 (ref 1.7–7)
NEUTROPHILS NFR BLD AUTO: 88.4 % (ref 42.7–76)
NRBC BLD AUTO-RTO: 0 /100 WBC (ref 0–0.2)
PHOSPHATE SERPL-MCNC: 2.8 MG/DL (ref 2.5–4.5)
PLATELET # BLD AUTO: 150 10*3/MM3 (ref 140–450)
PMV BLD AUTO: 10.9 FL (ref 6–12)
POTASSIUM SERPL-SCNC: 4.4 MMOL/L (ref 3.5–5.2)
RBC # BLD AUTO: 2.52 10*6/MM3 (ref 3.77–5.28)
SODIUM SERPL-SCNC: 139 MMOL/L (ref 136–145)
TIBC SERPL-MCNC: 173 MCG/DL (ref 298–536)
TRANSFERRIN SERPL-MCNC: 116 MG/DL (ref 200–360)
WBC NRBC COR # BLD AUTO: 7.02 10*3/MM3 (ref 3.4–10.8)

## 2024-07-31 PROCEDURE — 94799 UNLISTED PULMONARY SVC/PX: CPT

## 2024-07-31 PROCEDURE — 94761 N-INVAS EAR/PLS OXIMETRY MLT: CPT

## 2024-07-31 PROCEDURE — 85025 COMPLETE CBC W/AUTO DIFF WBC: CPT | Performed by: ORTHOPAEDIC SURGERY

## 2024-07-31 PROCEDURE — 83540 ASSAY OF IRON: CPT | Performed by: ORTHOPAEDIC SURGERY

## 2024-07-31 PROCEDURE — 84100 ASSAY OF PHOSPHORUS: CPT | Performed by: ORTHOPAEDIC SURGERY

## 2024-07-31 PROCEDURE — 82728 ASSAY OF FERRITIN: CPT | Performed by: ORTHOPAEDIC SURGERY

## 2024-07-31 PROCEDURE — 25010000002 ENOXAPARIN PER 10 MG: Performed by: INTERNAL MEDICINE

## 2024-07-31 PROCEDURE — 97165 OT EVAL LOW COMPLEX 30 MIN: CPT

## 2024-07-31 PROCEDURE — 80048 BASIC METABOLIC PNL TOTAL CA: CPT | Performed by: ORTHOPAEDIC SURGERY

## 2024-07-31 PROCEDURE — 84466 ASSAY OF TRANSFERRIN: CPT | Performed by: ORTHOPAEDIC SURGERY

## 2024-07-31 PROCEDURE — 83735 ASSAY OF MAGNESIUM: CPT | Performed by: ORTHOPAEDIC SURGERY

## 2024-07-31 PROCEDURE — 97161 PT EVAL LOW COMPLEX 20 MIN: CPT

## 2024-07-31 RX ORDER — FERROUS SULFATE 325(65) MG
325 TABLET ORAL 2 TIMES DAILY WITH MEALS
Status: DISCONTINUED | OUTPATIENT
Start: 2024-07-31 | End: 2024-08-06 | Stop reason: HOSPADM

## 2024-07-31 RX ORDER — HYDROCODONE BITARTRATE AND ACETAMINOPHEN 5; 325 MG/1; MG/1
1 TABLET ORAL EVERY 6 HOURS PRN
Status: DISPENSED | OUTPATIENT
Start: 2024-07-31 | End: 2024-08-05

## 2024-07-31 RX ADMIN — AVOBENZONE, HOMOSALATE, OCTISALATE, OCTOCRYLENE, AND OXYBENZONE 1 PACKET: 29.4; 147; 49; 25.4; 58.8 LOTION TOPICAL at 09:21

## 2024-07-31 RX ADMIN — ASPIRIN 81 MG: 81 TABLET, COATED ORAL at 09:21

## 2024-07-31 RX ADMIN — TAMSULOSIN HYDROCHLORIDE 0.4 MG: 0.4 CAPSULE ORAL at 21:41

## 2024-07-31 RX ADMIN — BUDESONIDE 0.5 MG: 0.5 INHALANT ORAL at 10:28

## 2024-07-31 RX ADMIN — SENNOSIDES AND DOCUSATE SODIUM 2 TABLET: 50; 8.6 TABLET ORAL at 09:21

## 2024-07-31 RX ADMIN — BUDESONIDE 0.5 MG: 0.5 INHALANT ORAL at 23:42

## 2024-07-31 RX ADMIN — POLYETHYLENE GLYCOL 3350 17 G: 17 POWDER, FOR SOLUTION ORAL at 09:21

## 2024-07-31 RX ADMIN — ARFORMOTEROL TARTRATE 15 MCG: 15 SOLUTION RESPIRATORY (INHALATION) at 23:41

## 2024-07-31 RX ADMIN — FERROUS SULFATE TAB 325 MG (65 MG ELEMENTAL FE) 325 MG: 325 (65 FE) TAB at 10:23

## 2024-07-31 RX ADMIN — ACETAMINOPHEN 1000 MG: 500 TABLET ORAL at 17:25

## 2024-07-31 RX ADMIN — SENNOSIDES AND DOCUSATE SODIUM 2 TABLET: 50; 8.6 TABLET ORAL at 21:40

## 2024-07-31 RX ADMIN — PRAVASTATIN SODIUM 40 MG: 20 TABLET ORAL at 21:40

## 2024-07-31 RX ADMIN — DILTIAZEM HYDROCHLORIDE 90 MG: 60 TABLET, FILM COATED ORAL at 10:23

## 2024-07-31 RX ADMIN — POTASSIUM CHLORIDE, DEXTROSE MONOHYDRATE AND SODIUM CHLORIDE 75 ML/HR: 150; 5; 900 INJECTION, SOLUTION INTRAVENOUS at 11:11

## 2024-07-31 RX ADMIN — BETHANECHOL CHLORIDE 10 MG: 10 TABLET ORAL at 17:25

## 2024-07-31 RX ADMIN — ENOXAPARIN SODIUM 30 MG: 100 INJECTION SUBCUTANEOUS at 09:22

## 2024-07-31 RX ADMIN — HYDROCODONE BITARTRATE AND ACETAMINOPHEN 1 TABLET: 5; 325 TABLET ORAL at 07:50

## 2024-07-31 RX ADMIN — ARFORMOTEROL TARTRATE 15 MCG: 15 SOLUTION RESPIRATORY (INHALATION) at 10:28

## 2024-07-31 RX ADMIN — BETHANECHOL CHLORIDE 10 MG: 10 TABLET ORAL at 21:41

## 2024-07-31 RX ADMIN — CYANOCOBALAMIN TAB 500 MCG 1000 MCG: 500 TAB at 09:21

## 2024-07-31 RX ADMIN — BETHANECHOL CHLORIDE 10 MG: 10 TABLET ORAL at 09:21

## 2024-07-31 RX ADMIN — SERTRALINE HYDROCHLORIDE 25 MG: 25 TABLET ORAL at 09:21

## 2024-07-31 RX ADMIN — HYDROCODONE BITARTRATE AND ACETAMINOPHEN 1 TABLET: 5; 325 TABLET ORAL at 23:08

## 2024-07-31 RX ADMIN — FERROUS SULFATE TAB 325 MG (65 MG ELEMENTAL FE) 325 MG: 325 (65 FE) TAB at 17:25

## 2024-07-31 RX ADMIN — NICOTINE 1 PATCH: 21 PATCH, EXTENDED RELEASE TRANSDERMAL at 09:24

## 2024-07-31 NOTE — PROGRESS NOTES
[facility]     Progress Note    Patient Name: Danielle Granda  : 1940  MRN: [id number]  Primary Care Physician:  Provider, No Known  Date of admission: 2024      Subjective   Brief summary.  Patient admitted post fall, sustained hip fracture.  Postsurgery      HPI:  Seen earlier this morning and again later this afternoon.  Patient was doing fairly well this morning, had difficulty with urinating, Flomax and Urecholine added.  Further patient felt dizzy and almost fell when she stood.  Patient able to void afterwards.  Lethargic and weak.    Review of Systems     No chest pain no shortness of breath, no palpitations reported.  Dizziness and lightheadedness on standing.      Objective     Vitals:   Temp:  [97.2 °F (36.2 °C)-98.1 °F (36.7 °C)] 97.5 °F (36.4 °C)  Heart Rate:  [] 87  Resp:  [14-18] 14  BP: (108-148)/(56-77) 111/67  Flow (L/min):  [2] 2    Physical Exam :     Elderly female not in acute distress.  Heart regular.  Lungs clear.  Abdomen soft.  Nontender.  Extremities no edema      Result Review:  I have personally reviewed the results from the time of this admission to 2024 19:29 EDT and agree with these findings:  []  Laboratory  []  Microbiology  []  Radiology  []  EKG/Telemetry   []  Cardiology/Vascular   []  Pathology  []  Old records  []  Other:           Assessment / Plan       Active Hospital Problems:  Active Hospital Problems    Diagnosis     Closed intertrochanteric fracture of hip, left, initial encounter     Intertrochanteric fracture of left femur     Acute urinary retention     COPD (chronic obstructive pulmonary disease)     Fall        Plan:   Monitor orthostats, fluids as needed.  Continue Flomax and Urecholine patient urinating now.  Urine analysis and cultures ordered.  Continue PT OT efforts.  Discussed with family for inpatient rehab       VTE Prophylaxis:  Pharmacologic & mechanical VTE prophylaxis orders are present.        CODE STATUS:   Level Of  Support Discussed With: Patient  Code Status (Patient has no pulse and is not breathing): CPR (Attempt to Resuscitate)  Medical Interventions (Patient has pulse or is breathing): Full Support            Electronically signed by Santo Carter MD, 07/31/24, 7:29 PM EDT.

## 2024-07-31 NOTE — PLAN OF CARE
Goal Outcome Evaluation:  Plan of Care Reviewed With: patient           Outcome Evaluation: Patient presents with decreased strength, transfers and ambulation.  Skilled physical therapy services will be required to address these mobility deficits.      Anticipated Discharge Disposition (PT): skilled nursing facility

## 2024-07-31 NOTE — PLAN OF CARE
Goal Outcome Evaluation:  Plan of Care Reviewed With: patient, family        Progress: no change  Outcome Evaluation: Pt alert with periods of confusion, able to make needs known. Pain controlled with PRN Tramadol. Pt having urinary retention, bladder scan performed and straight cath completed as ordered. MD did order Urecholine and Flomax to help. Pt pending therapy evaluation.

## 2024-07-31 NOTE — PLAN OF CARE
Problem: Adult Inpatient Plan of Care  Goal: Plan of Care Review  Recent Flowsheet Documentation  Taken 7/31/2024 1302 by Jayant Omer OT  Outcome Evaluation: Patient presents with limitations affecting strength, activity tolerance, and balance impacting patient's ability to return home safely and independently.  The skills of a therapist will be required to safely and effectively implement the following treatment plan to restore maximal level of function  Taken 7/31/2024 1259 by Jayant Omer OT  Progress: no change   Goal Outcome Evaluation:           Progress: no change  Outcome Evaluation: Patient presents with limitations affecting strength, activity tolerance, and balance impacting patient's ability to return home safely and independently.  The skills of a therapist will be required to safely and effectively implement the following treatment plan to restore maximal level of function      Anticipated Discharge Disposition (OT): inpatient rehabilitation facility, sub acute care setting

## 2024-07-31 NOTE — THERAPY EVALUATION
Acute Care - Physical Therapy Initial Evaluation  WALESKA Segura     Patient Name: Danielle Granda  : 1940  MRN: [id number]  Today's Date: 2024     Admit date: 2024     Referring Physician: Santo Carter MD     Surgery Date:2024 - 2024   Procedure(s) (LRB):  HIP INTERTROCHANTERIC NAILING (Left)          Visit Dx:     ICD-10-CM ICD-9-CM   1. Closed intertrochanteric fracture of hip, left, initial encounter  S72.142A 820.21   2. Difficulty in walking  R26.2 719.7     Patient Active Problem List   Diagnosis    Fracture    Closed intertrochanteric fracture of hip, left, initial encounter    Intertrochanteric fracture of left femur    Acute urinary retention    COPD (chronic obstructive pulmonary disease)    Fall     Past Medical History:   Diagnosis Date    Anxiety     Hyperlipidemia     Hypertension      Past Surgical History:   Procedure Laterality Date    HYSTERECTOMY       PT Assessment (Last 12 Hours)       PT Evaluation and Treatment       Row Name 24 1100          Physical Therapy Time and Intention    Subjective Information no complaints  -ALISSA     Document Type evaluation  -ALISSA     Mode of Treatment individual therapy;physical therapy  -ALISSA     Patient Effort good  -ALISSA       Row Name 24 1100          General Information    Patient Observations alert;agree to therapy;cooperative  -ALISSA     Prior Level of Function independent:;all household mobility;community mobility  -ALISSA     Equipment Currently Used at Home walker, rolling  -ALISSA     Existing Precautions/Restrictions fall;weight bearing  -ALISSA     Barriers to Rehab none identified  -ALISSA       Row Name 24 1100          Living Environment    Current Living Arrangements home  -ALISSA       Row Name 24 1100          Home Use of Assistive/Adaptive Equipment    Equipment Currently Used at Home cane, straight  -ALISSA       Row Name 24 1100          Range of Motion (ROM)    Range of Motion ROM is WFL  -ALISSA       Row Name 24 1100           Strength (Manual Muscle Testing)    Strength (Manual Muscle Testing) bilateral lower extremities  3+/5  -ALISSA       Row Name 07/31/24 1100          Mobility    Extremity Weight-bearing Status left lower extremity  -ALISSA     Left Lower Extremity (Weight-bearing Status) weight-bearing as tolerated (WBAT)  -ALISSA       Row Name 07/31/24 1100          Bed Mobility    Bed Mobility bed mobility (all) activities;supine-sit  -ALISSA     All Activities, [place] (Bed Mobility) moderate assist (50% patient effort)  -ALISSA     Supine-Sit [place] (Bed Mobility) moderate assist (50% patient effort)  -ALISSA       Row Name 07/31/24 1100          Transfers    Transfers bed-chair transfer;sit-stand transfer  -ALISSA       Row Name 07/31/24 1100          Bed-Chair Transfer    Bed-Chair [place] (Transfers) minimum assist (75% patient effort)  -ALISSA     Assistive Device (Bed-Chair Transfers) walker, front-wheeled  -ALISSA       Row Name 07/31/24 1100          Sit-Stand Transfer    Sit-Stand [place] (Transfers) minimum assist (75% patient effort)  -ALISSA     Assistive Device (Sit-Stand Transfers) walker, front-wheeled  -ALISSA       Row Name 07/31/24 1100          Gait/Stairs (Locomotion)    Gait/Stairs Locomotion gait/ambulation assistive device  -ALISSA     [place] Level (Gait) minimum assist (75% patient effort)  -ALISSA     Assistive Device (Gait) walker, front-wheeled  -ALISSA     Patient was able to Ambulate yes  -ALISSA     Distance in Feet (Gait) 10  -ALISSA       Row Name 07/31/24 1100          Safety Issues, Functional Mobility    Impairments Affecting Function (Mobility) balance;pain;strength  -ALISSA       Row Name 07/31/24 1100          Balance    Balance Assessment standing dynamic balance  -ALISSA     Dynamic Standing Balance minimal assist  -ALISSA     Position/Device Used, Standing Balance walker, front-wheeled  -ALISSA       Row Name             Wound 07/30/24 1655 Left anterior hip Incision    Wound - Properties Group Placement Date: 07/30/24  -SP  Placement Time: 1655 -SP Present on Original Admission: N  -SP Side: Left  -SP Orientation: anterior  -SP Location: hip  -SP Primary Wound Type: Incision  -SP    Retired Wound - Properties Group Placement Date: 07/30/24  -SP Placement Time: 1655 -SP Present on Original Admission: N  -SP Side: Left  -SP Orientation: anterior  -SP Location: hip  -SP Primary Wound Type: Incision  -SP    Retired Wound - Properties Group Date first assessed: 07/30/24  -SP Time first assessed: 1655 -SP Present on Original Admission: N  -SP Side: Left  -SP Location: hip  -SP Primary Wound Type: Incision  -SP      Row Name 07/31/24 1100          Plan of Care Review    Plan of Care Reviewed With patient  -ALISSA     Outcome Evaluation Patient presents with decreased strength, transfers and ambulation.  Skilled physical therapy services will be required to address these mobility deficits.  -ALISSA       Row Name 07/31/24 1100          Therapy Assessment/Plan (PT)    Rehab Potential (PT) good, to achieve stated therapy goals  -ALISSA     Criteria for Skilled Interventions Met (PT) skilled treatment is necessary  -ALISSA     Therapy Frequency (PT) daily  -ALISSA     Predicted Duration of Therapy Intervention (PT) 10 days  -ALISSA     Problem List (PT) problems related to;balance;mobility;strength;pain  -ALISSA     Activity Limitations Related to Problem List (PT) unable to ambulate safely;unable to transfer safely  -ALISSA       Row Name 07/31/24 1100          PT Evaluation Complexity    History, PT Evaluation Complexity no personal factors and/or comorbidities  -ALISSA     Examination of Body Systems (PT Eval Complexity) total of 4 or more elements  -ALISSA     Clinical Presentation (PT Evaluation Complexity) stable  -ALISSA     Clinical Decision Making (PT Evaluation Complexity) low complexity  -ALISSA     Overall Complexity (PT Evaluation Complexity) low complexity  -ALISSA       Row Name 07/31/24 1100          Therapy Plan Review/Discharge Plan (PT)    Therapy Plan Review (PT)  evaluation/treatment results reviewed;participants voiced agreement with care plan;participants included;patient  -ALISSA       Row Name 07/31/24 1100          Physical Therapy Goals    Transfer Goal Selection (PT) transfer, PT goal 1  -ALISSA     Gait Training Goal Selection (PT) gait training, PT goal 1  -ALISSA       Row Name 07/31/24 1100          Transfer Goal 1 (PT)    Activity/Assistive Device (Transfer Goal 1, PT) transfers, all  -ALISSA     [place] Level/Cues Needed (Transfer Goal 1, PT) independent  -ALISSA     Time Frame (Transfer Goal 1, PT) long term goal (LTG);10 days  -ALISSA       Row Name 07/31/24 1100          Gait Training Goal 1 (PT)    Activity/Assistive Device (Gait Training Goal 1, PT) gait (walking locomotion);assistive device use;walker, rolling  -ALISSA     [place] Level (Gait Training Goal 1, PT) independent  -ALISSA     Distance (Gait Training Goal 1, PT) 300  -ALISSA     Time Frame (Gait Training Goal 1, PT) long term goal (LTG);10 days  -ALISSA               User Key  (r) = Recorded By, (t) = Taken By, (c) = Cosigned By      Initials Name Provider Type    ALISSA Bob Smart, PT Physical Therapist    Suzette Oliver, RN Registered Nurse                      PT Recommendation and Plan  Anticipated Discharge Disposition (PT): skilled nursing facility  Planned Therapy Interventions (PT): balance training, bed mobility training, gait training, home exercise program, stair training, strengthening, transfer training  Therapy Frequency (PT): daily  Plan of Care Reviewed With: patient  Outcome Evaluation: Patient presents with decreased strength, transfers and ambulation.  Skilled physical therapy services will be required to address these mobility deficits.   Outcome Measures       Row Name 07/31/24 1100             How much help from another person do you currently need...    Turning from your back to your side while in flat bed without using bedrails? 3  -ALISSA      Moving from lying on back to sitting on the side  of a flat bed without bedrails? 2  -ALISSA      Moving to and from a bed to a chair (including a wheelchair)? 3  -ALISSA      Standing up from a chair using your arms (e.g., wheelchair, bedside chair)? 3  -ALISSA      Climbing 3-5 steps with a railing? 1  -ALISSA      To walk in hospital room? 3  -ALISSA      AM-PAC 6 Clicks Score (PT) 15  -ALISSA      Highest Level of Mobility Goal 4 --> Transfer to chair/commode  -ALISSA         Functional Assessment    Outcome Measure Options AM-PAC 6 Clicks Basic Mobility (PT)  -ALISSA                User Key  (r) = Recorded By, (t) = Taken By, (c) = Cosigned By      Initials Name Provider Type    Bob York, PT Physical Therapist                     Time Calculation:    PT Charges       Row Name 07/31/24 1116             Time Calculation    PT Received On 07/31/24  -ALISSA      PT Goal Re-Cert Due Date 08/09/24  -ALISSA         Untimed Charges    PT Eval/Re-eval Minutes 30  -ALISSA         Total Minutes    Untimed Charges Total Minutes 30  -ALISSA       Total Minutes 30  -ALISSA                User Key  (r) = Recorded By, (t) = Taken By, (c) = Cosigned By      Initials Name Provider Type    Bob York, PT Physical Therapist                      PT G-Codes  Outcome Measure Options: AM-PAC 6 Clicks Basic Mobility (PT)  AM-PAC 6 Clicks Score (PT): 15    Bob Smart, PT  7/31/2024

## 2024-07-31 NOTE — THERAPY EVALUATION
Patient Name: Danielle Granda  : 1940    MRN: [id number]                              Today's Date: 2024       Admit Date: 2024    Visit Dx:     ICD-10-CM ICD-9-CM   1. Closed intertrochanteric fracture of hip, left, initial encounter  S72.142A 820.21   2. Difficulty in walking  R26.2 719.7     Patient Active Problem List   Diagnosis    Fracture    Closed intertrochanteric fracture of hip, left, initial encounter    Intertrochanteric fracture of left femur    Acute urinary retention    COPD (chronic obstructive pulmonary disease)    Fall     Past Medical History:   Diagnosis Date    Anxiety     Hyperlipidemia     Hypertension      Past Surgical History:   Procedure Laterality Date    HYSTERECTOMY        General Information       Row Name 24 1255          OT Time and Intention    Document Type evaluation  -PG     Mode of Treatment individual therapy;occupational therapy  -PG       Row Name 24 1258          General Information    Patient Profile Reviewed yes  Lives with son.  Family reports patient normally does not use any assistive device but patient states that she has been using her walker this past week.  Requires minimal assistance with self-care activities.  -PG     Prior Level of Function transfer;ADL's;min assist:  -PG     Existing Precautions/Restrictions fall  -PG     Barriers to Rehab none identified  -PG       Row Name 24 1259          Occupational Profile    Reason for Services/Referral (Occupational Profile) Patient is a 84-year-old female admitted for a fall resulting in a left hip intratrochanteric nailing.  Patient is being evaluated by Occupational Therapy due to recent decline in ADL function.  No previous OT services identified  -PG       Row Name 24 1255          Living Environment    People in Home child(sandeep), adult  -PG       Row Name 24 1254          Cognition    Orientation Status (Cognition) oriented x 3  -PG       Row Name 24 1256           Safety Issues, Functional Mobility    Impairments Affecting Function (Mobility) balance;pain;strength;range of motion (ROM);endurance/activity tolerance  -PG               User Key  (r) = Recorded By, (t) = Taken By, (c) = Cosigned By      Initials Name Provider Type    PG Jayant Omer OT Occupational Therapist                     Mobility/ADL's       Row Name 07/31/24 1257          Transfers    Transfers bed-chair transfer;sit-stand transfer  -       Row Name 07/31/24 1257          Bed-Chair Transfer    Bed-Chair [place] (Transfers) minimum assist (75% patient effort);verbal cues  -PG     Assistive Device (Bed-Chair Transfers) walker, front-wheeled  -       Row Name 07/31/24 1257          Activities of Daily Living    BADL Assessment/Intervention bathing;upper body dressing;lower body dressing;grooming;toileting  -       Row Name 07/31/24 1257          Bathing Assessment/Intervention    [place] Level (Bathing) bathing skills;maximum assist (25% patient effort)  -       Row Name 07/31/24 1257          Upper Body Dressing Assessment/Training    [place] Level (Upper Body Dressing) upper body dressing skills;maximum assist (25% patient effort)  -       Row Name 07/31/24 1257          Lower Body Dressing Assessment/Training    [place] Level (Lower Body Dressing) lower body dressing skills;dependent (less than 25% patient effort)  -       Row Name 07/31/24 1257          Grooming Assessment/Training    [place] Level (Grooming) grooming skills;minimum assist (75% patient effort)  -       Row Name 07/31/24 1257          Toileting Assessment/Training    [place] Level (Toileting) toileting skills;dependent (less than 25% patient effort)  -PG               User Key  (r) = Recorded By, (t) = Taken By, (c) = Cosigned By      Initials Name Provider Type    Jayant Doherty OT Occupational Therapist                   Obj/Interventions       Row Name 07/31/24 1258          Sensory  Assessment (Somatosensory)    Sensory Assessment (Somatosensory) unable/difficult to assess  -PG       [facility] Name 07/31/24 1258          Vision Assessment/Intervention    Visual Impairment/Limitations unable/difficult to assess  -PG       Row Name 07/31/24 1258          Range of Motion Comprehensive    General Range of Motion no range of motion deficits identified  -PG       Row Name 07/31/24 1258          Strength Comprehensive (MMT)    Comment, General Manual Muscle Testing (MMT) Assessment 4 -/5 BUE  -PG       Row Name 07/31/24 1258          Motor Skills    Motor Skills coordination;functional endurance  -PG     Coordination WFL  -PG     Functional Endurance Fair minus  -PG               User Key  (r) = Recorded By, (t) = Taken By, (c) = Cosigned By      Initials Name Provider Type    PG Jayant Omer, OT Occupational Therapist                   Goals/Plan       Row Name 07/31/24 1259          Transfer Goal 1 (OT)    Activity/Assistive Device (Transfer Goal 1, OT) transfers, all  -PG     [place] Level/Cues Needed (Transfer Goal 1, OT) modified independence  -PG     Time Frame (Transfer Goal 1, OT) long term goal (LTG);10 days  -PG       Row Name 07/31/24 1259          Bathing Goal 1 (OT)    Activity/Device (Bathing Goal 1, OT) bathing skills, all  -PG     [place] Level/Cues Needed (Bathing Goal 1, OT) modified independence  -PG     Time Frame (Bathing Goal 1, OT) long term goal (LTG);10 days  -PG       Row Name 07/31/24 1259          Dressing Goal 1 (OT)    Activity/Device (Dressing Goal 1, OT) dressing skills, all  -PG     [place]/Cues Needed (Dressing Goal 1, OT) modified independence  -PG     Time Frame (Dressing Goal 1, OT) long term goal (LTG);10 days  -PG       Row Name 07/31/24 1259          Toileting Goal 1 (OT)    Activity/Device (Toileting Goal 1, OT) toileting skills, all  -PG     [place] Level/Cues Needed (Toileting Goal 1, OT) modified independence  -PG     Time Frame (Toileting  Goal 1, OT) long term goal (LTG);10 days  -PG       [facility] Name 07/31/24 1259          Grooming Goal 1 (OT)    Activity/Device (Grooming Goal 1, OT) grooming skills, all  -PG     [place] (Grooming Goal 1, OT) modified independence  -PG     Time Frame (Grooming Goal 1, OT) long term goal (LTG);10 days  -PG       [facility] Name 07/31/24 1259          Problem Specific Goal 1 (OT)    Problem Specific Goal 1 (OT) Patient will improve activity tolerance to fair plus to support independence and engagement with ADL activities  -PG     Time Frame (Problem Specific Goal 1, OT) long term goal (LTG);10 days  -PG       [facility] Name 07/31/24 1259          Therapy Assessment/Plan (OT)    Planned Therapy Interventions (OT) activity tolerance training;BADL retraining;strengthening exercise;transfer/mobility retraining;patient/caregiver education/training;occupation/activity based interventions  -PG               User Key  (r) = Recorded By, (t) = Taken By, (c) = Cosigned By      Initials Name Provider Type    PG Jayant Omer, [place] Occupational Therapist                   Clinical Impression       Row Name 07/31/24 1259          Pain Assessment    Pretreatment Pain Rating 0/10 - no pain  -PG     Posttreatment Pain Rating 0/10 - no pain  -PG       Row Name 07/31/24 1302 07/31/24 1259       Plan of Care Review    Progress -- no change  -PG    Outcome Evaluation Patient presents with limitations affecting strength, activity tolerance, and balance impacting patient's ability to return home safely and independently.  The skills of a therapist will be required to safely and effectively implement the following treatment plan to restore maximal level of function  -PG --      Row Name 07/31/24 1253          Therapy Assessment/Plan (OT)    Patient/Family Therapy Goal Statement (OT) Walk again and return home with family  -PG     Rehab Potential (OT) good, to achieve stated therapy goals  -PG     Criteria for Skilled Therapeutic Interventions Met (OT)  yes;meets criteria;skilled treatment is necessary  -PG     Therapy Frequency (OT) 5 times/wk  -PG       Row Name 07/31/24 1259          Therapy Plan Review/Discharge Plan (OT)    Anticipated Discharge Disposition (OT) inpatient rehabilitation facility;sub acute care setting  -PG               User Key  (r) = Recorded By, (t) = Taken By, (c) = Cosigned By      Initials Name Provider Type    PG Jayant Omer, OT Occupational Therapist                   Outcome Measures       Row Name 07/31/24 1300          How much help from another is currently needed...    Putting on and taking off regular lower body clothing? 1  -PG     Bathing (including washing, rinsing, and drying) 1  -PG     Toileting (which includes using toilet bed pan or urinal) 1  -PG     Putting on and taking off regular upper body clothing 2  -PG     Taking care of personal grooming (such as brushing teeth) 2  -PG     Eating meals 3  -PG     AM-PAC 6 Clicks Score (OT) 10  -PG       Row Name 07/31/24 1100 07/31/24 0710       How much help from another person do you currently need...    Turning from your back to your side while in flat bed without using bedrails? 3  -ALISSA 2  -MH    Moving from lying on back to sitting on the side of a flat bed without bedrails? 2  -ALISSA 1  -MH    Moving to and from a bed to a chair (including a wheelchair)? 3  -ALISSA 1  -MH    Standing up from a chair using your arms (e.g., wheelchair, bedside chair)? 3  -ALISSA 1  -MH    Climbing 3-5 steps with a railing? 1  -ALISSA 1  -MH    To walk in hospital room? 3  -ALISSA 1  -MH    AM-PAC 6 Clicks Score (PT) 15  -ALISSA 7  -MH    Highest Level of Mobility Goal 4 --> Transfer to chair/commode  -ALISSA 2 --> Bed activities/dependent transfer  -MH      Row Name 07/31/24 1300 07/31/24 1100       Functional Assessment    Outcome Measure Options AM-PAC 6 Clicks Daily Activity (OT);Optimal Instrument  -PG AM-PAC 6 Clicks Basic Mobility (PT)  -ALISSA      Row Name 07/31/24 1300          Optimal Instrument    Optimal  Instrument Optimal - 3  -PG     Bending/Stooping 4  -PG     Standing 3  -PG     Reaching 2  -PG     From the list, choose the 3 activities you would most like to be able to do without any difficulty Bending/stooping;Standing;Reaching  -PG     Total Score Optimal - 3 9  -PG               User Key  (r) = Recorded By, (t) = Taken By, (c) = Cosigned By      Initials Name Provider Type    Jesica Das, RN Registered Nurse    Jayant Doherty, OT Occupational Therapist    Bob York, PT Physical Therapist                    Occupational Therapy Education       Title: PT OT SLP Therapies (In Progress)       Topic: Occupational Therapy (In Progress)       Point: ADL training (In Progress)       Description:   Instruct learner(s) on proper safety adaptation and remediation techniques during self care or transfers.   Instruct in proper use of assistive devices.                  Learning Progress Summary             Patient Acceptance, E,D, NR by PG at 7/31/2024 1302                         Point: Home exercise program (In Progress)       Description:   Instruct learner(s) on appropriate technique for monitoring, assisting and/or progressing therapeutic exercises/activities.                  Learning Progress Summary             Patient Acceptance, E,D, NR by PG at 7/31/2024 1302                         Point: Precautions (In Progress)       Description:   Instruct learner(s) on prescribed precautions during self-care and functional transfers.                  Learning Progress Summary             Patient Acceptance, E,D, NR by PG at 7/31/2024 1302                         Point: Body mechanics (In Progress)       Description:   Instruct learner(s) on proper positioning and spine alignment during self-care, functional mobility activities and/or exercises.                  Learning Progress Summary             Patient Acceptance, E,D, NR by PG at 7/31/2024 1302                                         User Key        Initials Effective Dates Name Provider Type Discipline    PG 06/16/21 -  Jayant Omer OT Occupational Therapist OT                  OT Recommendation and Plan  Planned Therapy Interventions (OT): activity tolerance training, BADL retraining, strengthening exercise, transfer/mobility retraining, patient/caregiver education/training, occupation/activity based interventions  Therapy Frequency (OT): 5 times/wk  Plan of Care Review  Progress: no change  Outcome Evaluation: Patient presents with limitations affecting strength, activity tolerance, and balance impacting patient's ability to return home safely and independently.  The skills of a therapist will be required to safely and effectively implement the following treatment plan to restore maximal level of function     Time Calculation:   Evaluation Complexity (OT)  Review Occupational Profile/Medical/Therapy History Complexity: brief/low complexity  Assessment, Occupational Performance/Identification of Deficit Complexity: 3-5 performance deficits  Clinical Decision Making Complexity (OT): problem focused assessment/low complexity  Overall Complexity of Evaluation (OT): low complexity     Time Calculation- OT       Row Name 07/31/24 1303             Time Calculation- OT    OT Received On 07/31/24  -PG      OT Goal Re-Cert Due Date 08/09/24  -PG         Untimed Charges    OT Eval/Re-eval Minutes 35  -PG         Total Minutes    Untimed Charges Total Minutes 35  -PG       Total Minutes 35  -PG                User Key  (r) = Recorded By, (t) = Taken By, (c) = Cosigned By      Initials Name Provider Type    PG Jayant Omer OT Occupational Therapist                  Therapy Charges for Today       Code Description Service Date Service Provider Modifiers Qty    [id number] HC OT EVAL LOW COMPLEXITY 3 7/31/2024 Jayant Omer OT GO 1                 Jayant Omer OT  7/31/2024

## 2024-07-31 NOTE — H&P
[facility]   HISTORY AND PHYSICAL    Patient Name: Danielle Granda  : 1940  MRN: [id number]  Primary Care Physician:  Provider, No Known  Date of admission: 2024    Subjective   Subjective     Chief Complaint:   Fall and left hip pain      HPI:    Danielle Granda is a 84 y.o. female who was admitted yesterday post fall.  Patient was at home in the kitchen and fell.  There was a question of loss of consciousness.  Patient does not recollect the sequence of events.  Workup in the ER revealed left femoral fracture.  Patient was admitted to hospitalist service, surgery was performed today.  Patient's family requested my care and requested transfer to my service earlier this morning.  (Hospitalist was taking care patient was notified and he preferred to transfer patient tomorrow, but due to patient's family is insistent patient was transferred to my care at 7 PM.  RN notified me about transfer as patient was not urinating.)  When examined patient is awake alert.  No complaints at this time, family at bedside.  Patient not eating or drinking for some time.  Patient had a similar fall 2 to 3 weeks ago and sustained injury to head and had fracture of the clavicle.        Review of Systems:    Fatigue and weakness.  Patient looking up most of the time while talking.  No chest pain no shortness of breath.  No headache.  Decreased urine output.  Patient has not urinated since admission.  No nausea vomiting or diarrhea.      Personal History     Past Medical History:   Diagnosis Date   • Anxiety    • Hyperlipidemia    • Hypertension        Past Surgical History:   Procedure Laterality Date   • HYSTERECTOMY         Family History: family history is not on file. Otherwise pertinent FHx was reviewed and not pertinent to current issue.    Social History:  reports that she has been smoking cigarettes. She has never used smokeless tobacco. She reports that she does not drink alcohol and does not use  drugs.    Home Medications:  Cyanocobalamin, aspirin, budesonide, cholecalciferol, dilTIAZem, levalbuterol, pravastatin, sertraline, and traMADol      Allergies:  No Known Allergies    Objective   Objective     Vitals:   Temp:  [97.2 °F (36.2 °C)-98.7 °F (37.1 °C)] 97.7 °F (36.5 °C)  Heart Rate:  [50-93] 80  Resp:  [10-18] 16  BP: (106-156)/(49-82) 125/68  Flow (L/min):  [2-6] 2    Physical Exam    Elderly female looks of her stated age, not in acute distress, thinly built.  HEENT pupils reactive.  Upward gaze noted.  Neck supple.  Heart regular.  Lungs clear.  Diminished breath sounds.  Abdomen soft.  Extremities no edema, peripheral pulses intact, left hip postsurgery.      I have personally reviewed the results from the time of this admission to 7/30/2024 20:12 EDT and agree with these findings:  [x]  Laboratory  [x]  Microbiology  [x]  Radiology  [x]  EKG/Telemetry   [x]  Cardiology/Vascular   []  Pathology  []  Old records  []  Other:    CBC:    WBC   Date Value Ref Range Status   07/30/2024 8.52 3.40 - 10.80 10*3/mm3 Final     RBC   Date Value Ref Range Status   07/30/2024 3.14 (L) 3.77 - 5.28 10*6/mm3 Final     Hemoglobin   Date Value Ref Range Status   07/30/2024 9.8 (L) 12.0 - 15.9 g/dL Final     Hematocrit   Date Value Ref Range Status   07/30/2024 30.7 (L) 34.0 - 46.6 % Final     MCV   Date Value Ref Range Status   07/30/2024 97.8 (H) 79.0 - 97.0 fL Final     MCH   Date Value Ref Range Status   07/30/2024 31.2 26.6 - 33.0 pg Final     MCHC   Date Value Ref Range Status   07/30/2024 31.9 31.5 - 35.7 g/dL Final     RDW   Date Value Ref Range Status   07/30/2024 14.0 12.3 - 15.4 % Final     RDW-SD   Date Value Ref Range Status   07/30/2024 49.5 37.0 - 54.0 fl Final     MPV   Date Value Ref Range Status   07/30/2024 11.1 6.0 - 12.0 fL Final     Platelets   Date Value Ref Range Status   07/30/2024 214 140 - 450 10*3/mm3 Final     Neutrophil %   Date Value Ref Range Status   07/30/2024 75.2 42.7 - 76.0 % Final      Lymphocyte %   Date Value Ref Range Status   07/30/2024 16.0 (L) 19.6 - 45.3 % Final     Monocyte %   Date Value Ref Range Status   07/30/2024 7.9 5.0 - 12.0 % Final     Eosinophil %   Date Value Ref Range Status   07/30/2024 0.2 (L) 0.3 - 6.2 % Final     Basophil %   Date Value Ref Range Status   07/30/2024 0.2 0.0 - 1.5 % Final     Immature Grans %   Date Value Ref Range Status   07/30/2024 0.5 0.0 - 0.5 % Final     Neutrophils, Absolute   Date Value Ref Range Status   07/30/2024 6.41 1.70 - 7.00 10*3/mm3 Final     Lymphocytes, Absolute   Date Value Ref Range Status   07/30/2024 1.36 0.70 - 3.10 10*3/mm3 Final     Monocytes, Absolute   Date Value Ref Range Status   07/30/2024 0.67 0.10 - 0.90 10*3/mm3 Final     Eosinophils, Absolute   Date Value Ref Range Status   07/30/2024 0.02 0.00 - 0.40 10*3/mm3 Final     Basophils, Absolute   Date Value Ref Range Status   07/30/2024 0.02 0.00 - 0.20 10*3/mm3 Final     Immature Grans, Absolute   Date Value Ref Range Status   07/30/2024 0.04 0.00 - 0.05 10*3/mm3 Final     nRBC   Date Value Ref Range Status   07/30/2024 0.0 0.0 - 0.2 /100 WBC Final        BMP:    Lab Results   Component Value Date    GLUCOSE 100 (H) 07/30/2024    BUN 26 (H) 07/30/2024    CREATININE 0.69 07/30/2024    BCR 37.7 (H) 07/30/2024    K 3.9 07/30/2024    CO2 26.1 07/30/2024    CALCIUM 9.5 07/30/2024    ALBUMIN 3.8 07/29/2024    LABIL2 1.6 01/23/2020    AST 14 07/29/2024    ALT 7 07/29/2024        XR Chest 1 View    Result Date: 7/29/2024  No radiographic findings of acute cardiopulmonary abnormality. Electronically Signed: Lyle Corona  7/29/2024 4:26 PM EDT  Workstation ID: [id number]    XR Hip With or Without Pelvis 2 - 3 View Left    Result Date: 7/29/2024  Impression: 1. Comminuted intertrochanteric fracture of the left femoral neck with mild impaction. 2. Diffuse osteopenia. Electronically Signed: Yaniv Pal MD  7/29/2024 3:08 PM EDT  Workstation ID: [id number]            Assessment & Plan    Assessment / Plan       Current Diagnosis:  Active Hospital Problems    Diagnosis    • Closed intertrochanteric fracture of hip, left, initial encounter    • Intertrochanteric fracture of left femur    • Acute urinary retention    • COPD (chronic obstructive pulmonary disease)    • Fall      Plan:   Patient admitted post fall, appears to have recurrent falls.  Uncertain etiology could be syncope could be arrhythmia.  Add remote telemetry.  Rule out arrhythmia.    Patient postsurgery for hip fracture.  Appreciate surgical help.  Continue postsurgical care.    Patient with urinary retention most likely from pain and decreased p.o. intake.  Straight cath as needed,.  Add Flomax and Urecholine.    Gentle IV hydration.    Neb treatments as needed.    Further management with the clinical course, lab results and consultant input    Discussed with patient's family in detail      VTE Prophylaxis:  Pharmacologic & mechanical VTE prophylaxis orders are present.        GI Prophylaxis:       Pepcid    CODE STATUS:    Level Of Support Discussed With: Patient  Code Status (Patient has no pulse and is not breathing): CPR (Attempt to Resuscitate)  Medical Interventions (Patient has pulse or is breathing): Full Support    Admission Status:  I believe this patient meets inpatient status.             I have dictated this note utilizing Dragon Dictation.             Please note that portions of this note were completed with a voice recognition program.             Part of this note may be an electronic transcription/translation of spoken language to printed text         using the Dragon Dictation System.       Electronically signed by Santo Carter MD, 07/30/24, 8:07 PM EDT.    Total time spent with in evaluation and management: 55-minutes

## 2024-07-31 NOTE — PLAN OF CARE
Goal Outcome Evaluation:   Patient experienced syncopal episode during shift while ambulating from BSC to bed. Patient came to when returned to bed. Vitals remained WDL. MD was notified.MD is wanting orthostatic BP's performed. Patient has had little complaints of pain that has been controlled with prn medication. Patient has been medicated x1 during shift. Patient has voided on own during shift.

## 2024-08-01 PROBLEM — D62 POSTOPERATIVE ANEMIA DUE TO ACUTE BLOOD LOSS: Status: ACTIVE | Noted: 2024-08-01

## 2024-08-01 PROBLEM — I10 ESSENTIAL HYPERTENSION: Status: ACTIVE | Noted: 2024-08-01

## 2024-08-01 LAB
ABO GROUP BLD: NORMAL
ABO GROUP BLD: NORMAL
ANION GAP SERPL CALCULATED.3IONS-SCNC: 8.3 MMOL/L (ref 5–15)
BASOPHILS # BLD AUTO: 0.02 10*3/MM3 (ref 0–0.2)
BASOPHILS NFR BLD AUTO: 0.2 % (ref 0–1.5)
BLD GP AB SCN SERPL QL: NEGATIVE
BUN SERPL-MCNC: 22 MG/DL (ref 8–23)
BUN/CREAT SERPL: 32.4 (ref 7–25)
CALCIUM SPEC-SCNC: 8.6 MG/DL (ref 8.6–10.5)
CHLORIDE SERPL-SCNC: 110 MMOL/L (ref 98–107)
CO2 SERPL-SCNC: 24.7 MMOL/L (ref 22–29)
CREAT SERPL-MCNC: 0.68 MG/DL (ref 0.57–1)
DEPRECATED RDW RBC AUTO: 52.7 FL (ref 37–54)
EGFRCR SERPLBLD CKD-EPI 2021: 86 ML/MIN/1.73
EOSINOPHIL # BLD AUTO: 0.02 10*3/MM3 (ref 0–0.4)
EOSINOPHIL NFR BLD AUTO: 0.2 % (ref 0.3–6.2)
ERYTHROCYTE [DISTWIDTH] IN BLOOD BY AUTOMATED COUNT: 14.1 % (ref 12.3–15.4)
GLUCOSE SERPL-MCNC: 138 MG/DL (ref 65–99)
HCT VFR BLD AUTO: 23.2 % (ref 34–46.6)
HCT VFR BLD AUTO: 27.2 % (ref 34–46.6)
HGB BLD-MCNC: 7.1 G/DL (ref 12–15.9)
HGB BLD-MCNC: 8.7 G/DL (ref 12–15.9)
IMM GRANULOCYTES # BLD AUTO: 0.05 10*3/MM3 (ref 0–0.05)
IMM GRANULOCYTES NFR BLD AUTO: 0.6 % (ref 0–0.5)
LYMPHOCYTES # BLD AUTO: 1.09 10*3/MM3 (ref 0.7–3.1)
LYMPHOCYTES NFR BLD AUTO: 12.1 % (ref 19.6–45.3)
MAGNESIUM SERPL-MCNC: 1.8 MG/DL (ref 1.6–2.4)
MCH RBC QN AUTO: 31.3 PG (ref 26.6–33)
MCHC RBC AUTO-ENTMCNC: 30.6 G/DL (ref 31.5–35.7)
MCV RBC AUTO: 102.2 FL (ref 79–97)
MONOCYTES # BLD AUTO: 0.68 10*3/MM3 (ref 0.1–0.9)
MONOCYTES NFR BLD AUTO: 7.5 % (ref 5–12)
NEUTROPHILS NFR BLD AUTO: 7.18 10*3/MM3 (ref 1.7–7)
NEUTROPHILS NFR BLD AUTO: 79.4 % (ref 42.7–76)
NRBC BLD AUTO-RTO: 0 /100 WBC (ref 0–0.2)
PHOSPHATE SERPL-MCNC: 1.7 MG/DL (ref 2.5–4.5)
PLATELET # BLD AUTO: 164 10*3/MM3 (ref 140–450)
PMV BLD AUTO: 11.6 FL (ref 6–12)
POTASSIUM SERPL-SCNC: 4.2 MMOL/L (ref 3.5–5.2)
RBC # BLD AUTO: 2.27 10*6/MM3 (ref 3.77–5.28)
RH BLD: POSITIVE
RH BLD: POSITIVE
SODIUM SERPL-SCNC: 143 MMOL/L (ref 136–145)
T&S EXPIRATION DATE: NORMAL
WBC NRBC COR # BLD AUTO: 9.04 10*3/MM3 (ref 3.4–10.8)

## 2024-08-01 PROCEDURE — 86923 COMPATIBILITY TEST ELECTRIC: CPT

## 2024-08-01 PROCEDURE — 85025 COMPLETE CBC W/AUTO DIFF WBC: CPT | Performed by: ORTHOPAEDIC SURGERY

## 2024-08-01 PROCEDURE — P9040 RBC LEUKOREDUCED IRRADIATED: HCPCS

## 2024-08-01 PROCEDURE — 86901 BLOOD TYPING SEROLOGIC RH(D): CPT

## 2024-08-01 PROCEDURE — 86900 BLOOD TYPING SEROLOGIC ABO: CPT

## 2024-08-01 PROCEDURE — 80048 BASIC METABOLIC PNL TOTAL CA: CPT | Performed by: ORTHOPAEDIC SURGERY

## 2024-08-01 PROCEDURE — 94664 DEMO&/EVAL PT USE INHALER: CPT

## 2024-08-01 PROCEDURE — 86850 RBC ANTIBODY SCREEN: CPT | Performed by: INTERNAL MEDICINE

## 2024-08-01 PROCEDURE — 86900 BLOOD TYPING SEROLOGIC ABO: CPT | Performed by: INTERNAL MEDICINE

## 2024-08-01 PROCEDURE — 94799 UNLISTED PULMONARY SVC/PX: CPT

## 2024-08-01 PROCEDURE — 94761 N-INVAS EAR/PLS OXIMETRY MLT: CPT

## 2024-08-01 PROCEDURE — 86901 BLOOD TYPING SEROLOGIC RH(D): CPT | Performed by: INTERNAL MEDICINE

## 2024-08-01 PROCEDURE — 84100 ASSAY OF PHOSPHORUS: CPT | Performed by: ORTHOPAEDIC SURGERY

## 2024-08-01 PROCEDURE — 25010000002 ENOXAPARIN PER 10 MG: Performed by: INTERNAL MEDICINE

## 2024-08-01 PROCEDURE — 97116 GAIT TRAINING THERAPY: CPT

## 2024-08-01 PROCEDURE — P9016 RBC LEUKOCYTES REDUCED: HCPCS

## 2024-08-01 PROCEDURE — 36430 TRANSFUSION BLD/BLD COMPNT: CPT

## 2024-08-01 PROCEDURE — 85018 HEMOGLOBIN: CPT | Performed by: INTERNAL MEDICINE

## 2024-08-01 PROCEDURE — 25010000002 FUROSEMIDE PER 20 MG: Performed by: INTERNAL MEDICINE

## 2024-08-01 PROCEDURE — 83735 ASSAY OF MAGNESIUM: CPT | Performed by: ORTHOPAEDIC SURGERY

## 2024-08-01 PROCEDURE — 85014 HEMATOCRIT: CPT | Performed by: INTERNAL MEDICINE

## 2024-08-01 PROCEDURE — 97110 THERAPEUTIC EXERCISES: CPT

## 2024-08-01 RX ORDER — FUROSEMIDE 10 MG/ML
20 INJECTION INTRAMUSCULAR; INTRAVENOUS ONCE
Status: COMPLETED | OUTPATIENT
Start: 2024-08-01 | End: 2024-08-01

## 2024-08-01 RX ORDER — METOPROLOL SUCCINATE 25 MG/1
25 TABLET, EXTENDED RELEASE ORAL
Status: DISCONTINUED | OUTPATIENT
Start: 2024-08-01 | End: 2024-08-02

## 2024-08-01 RX ADMIN — ARFORMOTEROL TARTRATE 15 MCG: 15 SOLUTION RESPIRATORY (INHALATION) at 08:55

## 2024-08-01 RX ADMIN — Medication 10 ML: at 10:15

## 2024-08-01 RX ADMIN — SENNOSIDES AND DOCUSATE SODIUM 2 TABLET: 50; 8.6 TABLET ORAL at 10:24

## 2024-08-01 RX ADMIN — Medication 10 ML: at 22:04

## 2024-08-01 RX ADMIN — FERROUS SULFATE TAB 325 MG (65 MG ELEMENTAL FE) 325 MG: 325 (65 FE) TAB at 17:41

## 2024-08-01 RX ADMIN — FUROSEMIDE 20 MG: 10 INJECTION, SOLUTION INTRAMUSCULAR; INTRAVENOUS at 13:00

## 2024-08-01 RX ADMIN — SENNOSIDES AND DOCUSATE SODIUM 2 TABLET: 50; 8.6 TABLET ORAL at 22:02

## 2024-08-01 RX ADMIN — ASPIRIN 81 MG: 81 TABLET, COATED ORAL at 10:14

## 2024-08-01 RX ADMIN — POTASSIUM CHLORIDE, DEXTROSE MONOHYDRATE AND SODIUM CHLORIDE 75 ML/HR: 150; 5; 900 INJECTION, SOLUTION INTRAVENOUS at 00:29

## 2024-08-01 RX ADMIN — CYANOCOBALAMIN TAB 500 MCG 1000 MCG: 500 TAB at 10:15

## 2024-08-01 RX ADMIN — METOPROLOL SUCCINATE 25 MG: 25 TABLET, EXTENDED RELEASE ORAL at 13:00

## 2024-08-01 RX ADMIN — BETHANECHOL CHLORIDE 10 MG: 10 TABLET ORAL at 10:14

## 2024-08-01 RX ADMIN — BETHANECHOL CHLORIDE 10 MG: 10 TABLET ORAL at 17:41

## 2024-08-01 RX ADMIN — TAMSULOSIN HYDROCHLORIDE 0.4 MG: 0.4 CAPSULE ORAL at 22:03

## 2024-08-01 RX ADMIN — ACETAMINOPHEN 1000 MG: 500 TABLET ORAL at 10:14

## 2024-08-01 RX ADMIN — ENOXAPARIN SODIUM 30 MG: 100 INJECTION SUBCUTANEOUS at 10:14

## 2024-08-01 RX ADMIN — ACETAMINOPHEN 1000 MG: 500 TABLET ORAL at 17:41

## 2024-08-01 RX ADMIN — BETHANECHOL CHLORIDE 10 MG: 10 TABLET ORAL at 22:03

## 2024-08-01 RX ADMIN — BUDESONIDE 0.5 MG: 0.5 INHALANT ORAL at 08:55

## 2024-08-01 RX ADMIN — FERROUS SULFATE TAB 325 MG (65 MG ELEMENTAL FE) 325 MG: 325 (65 FE) TAB at 10:15

## 2024-08-01 RX ADMIN — AVOBENZONE, HOMOSALATE, OCTISALATE, OCTOCRYLENE, AND OXYBENZONE 1 PACKET: 29.4; 147; 49; 25.4; 58.8 LOTION TOPICAL at 10:17

## 2024-08-01 RX ADMIN — PRAVASTATIN SODIUM 40 MG: 20 TABLET ORAL at 22:02

## 2024-08-01 RX ADMIN — SERTRALINE HYDROCHLORIDE 25 MG: 25 TABLET ORAL at 10:15

## 2024-08-01 RX ADMIN — NICOTINE 1 PATCH: 21 PATCH, EXTENDED RELEASE TRANSDERMAL at 10:17

## 2024-08-01 NOTE — PLAN OF CARE
Goal Outcome Evaluation:  Plan of Care Reviewed With: patient, son           Outcome Evaluation: Patient alert and oriented x 4, pleasant, calm, and cooperative.  VS WNL.  LCTA.  No complaints of pain during shift.  Dsgs to L hip clean, dry, and intact. Patient participated in therapy services.  Patient up to the commode with 1 assist and gait belt several times this shift.  Patient recieved RBC transfusion this shift and tolerated it well.  Patient had family in room during the entire shift that were supportive of patient.  Patient resting in bed with HOB elevated and call light in reach.

## 2024-08-01 NOTE — SIGNIFICANT NOTE
08/01/24 0941   OTHER   Discipline occupational therapist   Rehab Time/Intention   Session Not Performed patient unavailable for treatment

## 2024-08-01 NOTE — THERAPY TREATMENT NOTE
Acute Care - Physical Therapy Treatment Note  [facility]     Patient Name: Danielle Granda  : 1940  MRN: [id number]  Today's Date: 2024      Visit Dx:     ICD-10-CM ICD-9-CM   1. Closed intertrochanteric fracture of hip, left, initial encounter  S72.142A 820.21   2. Difficulty in walking  R26.2 719.7     Patient Active Problem List   Diagnosis    Fracture    Closed intertrochanteric fracture of hip, left, initial encounter    Intertrochanteric fracture of left femur    Acute urinary retention    COPD (chronic obstructive pulmonary disease)    Fall    Postoperative anemia due to acute blood loss    Essential hypertension     Past Medical History:   Diagnosis Date    Anxiety     Hyperlipidemia     Hypertension      Past Surgical History:   Procedure Laterality Date    HYSTERECTOMY       PT Assessment (Last 12 Hours)       PT Evaluation and Treatment       Row Name 24 1000          Physical Therapy Time and Intention    Subjective Information no complaints (P)   -TR     Document Type therapy note (daily note) (P)   -TR     Mode of Treatment individual therapy;physical therapy (P)   -TR     Patient Effort good (P)   -TR     Symptoms Noted During/After Treatment dizziness;shortness of breath (P)   -TR       Row Name 24 1000          Mobility    Extremity Weight-bearing Status left lower extremity (P)   -TR     Left Lower Extremity (Weight-bearing Status) weight-bearing as tolerated (WBAT) (P)   -TR       Row Name 24 1000          Bed Mobility    Bed Mobility scooting/bridging;supine-sit;sit-supine (P)   -TR     Scooting/Bridging [place] (Bed Mobility) verbal cues;moderate assist (50% patient effort) (P)   -TR     Supine-Sit [place] (Bed Mobility) minimum assist (75% patient effort);verbal cues (P)   -TR     Sit-Supine [place] (Bed Mobility) maximum assist (25% patient effort) (P)   -TR     Bed Mobility, Safety Issues decreased use of arms for pushing/pulling;decreased use of  legs for bridging/pushing (P)   -TR     Assistive Device (Bed Mobility) bed rails;draw sheet;head of bed elevated (P)   -TR       Row Name 08/01/24 1000          Transfers    Transfers sit-stand transfer;stand-sit transfer (P)   -TR     Maintains Weight-bearing Status (Transfers) able to maintain (P)   -TR       Row Name 08/01/24 1000          Sit-Stand Transfer    Sit-Stand [place] (Transfers) contact guard (P)   -TR     Assistive Device (Sit-Stand Transfers) walker, front-wheeled (P)   -TR       Row Name 08/01/24 1000          Stand-Sit Transfer    Stand-Sit [place] (Transfers) contact guard (P)   -TR     Assistive Device (Stand-Sit Transfers) walker, front-wheeled (P)   -TR       Row Name 08/01/24 1000          Gait/Stairs (Locomotion)    Gait/Stairs Locomotion gait/ambulation independence;gait/ambulation assistive device;distance ambulated (P)   -TR     [place] Level (Gait) minimum assist (75% patient effort) (P)   -TR     Assistive Device (Gait) walker, front-wheeled (P)   -TR     Patient was able to Ambulate yes (P)   -TR     Distance in Feet (Gait) 3 (P)   -TR     Pattern (Gait) step-to (P)   -TR     Deviations/Abnormal Patterns (Gait) base of support, narrow;gait speed decreased;stride length decreased (P)   -TR       Row Name 08/01/24 1000          Safety Issues, Functional Mobility    Impairments Affecting Function (Mobility) balance;pain;strength;range of motion (ROM);endurance/activity tolerance (P)   -TR       Row Name 08/01/24 1000          Balance    Balance Assessment standing dynamic balance (P)   pt retropulsive  -TR     Dynamic Standing Balance minimal assist (P)   -TR     Position/Device Used, Standing Balance walker, front-wheeled (P)   -TR       Row Name 08/01/24 1000          Motor Skills    Motor Skills coordination;functional endurance (P)   -TR     Therapeutic Exercise -- (P)   5x STS, AAROM L LE hip flexion on slide board in supine x15, Leg press x15 from hip flexed  position, slide board supine L hip abduction to 30 degrees, LAQ sitting EOB resisted x10  -TR       Row Name             Wound 07/30/24 1655 Left anterior hip Incision    Wound - Properties Group Placement Date: 07/30/24  -SP Placement Time: 1655 -SP Present on Original Admission: N  -SP Side: Left  -SP Orientation: anterior  -SP Location: hip  -SP Primary Wound Type: Incision  -SP    Retired Wound - Properties Group Placement Date: 07/30/24  -SP Placement Time: 1655 -SP Present on Original Admission: N  -SP Side: Left  -SP Orientation: anterior  -SP Location: hip  -SP Primary Wound Type: Incision  -SP    Retired Wound - Properties Group Date first assessed: 07/30/24  -SP Time first assessed: 1655 -SP Present on Original Admission: N  -SP Side: Left  -SP Location: hip  -SP Primary Wound Type: Incision  -SP      Row Name 08/01/24 1000          Positioning and Restraints    Pre-Treatment Position in bed (P)   -TR     Post Treatment Position bed (P)   -TR     In Bed call light within reach;encouraged to call for assist;exit alarm on (P)   -TR       Row Name 08/01/24 1000          Progress Summary (PT)    Daily Progress Summary (PT) Pt presented with deficits in strength, balance, ambulation, transfers, ROM, and endurance. Pt required minimal assistance getting up and out of bed but required near max assist to get to lying on back in bed. Continue POC. (P)   -TR               User Key  (r) = Recorded By, (t) = Taken By, (c) = Cosigned By      Initials Name Provider Type    Yuri Rodriguez PT Student PT Student    SP Suzette Boyer, RN Registered Nurse                      PT Recommendation and Plan     Progress Summary (PT)  Daily Progress Summary (PT): (P) Pt presented with deficits in strength, balance, ambulation, transfers, ROM, and endurance. Pt required minimal assistance getting up and out of bed but required near max assist to get to lying on back in bed. Continue POC.   Outcome Measures        Row Name 08/01/24 1100 07/31/24 1100          How much help from another person do you currently need...    Turning from your back to your side while in flat bed without using bedrails? 3 (P)   -TR 3  -ALISSA     Moving from lying on back to sitting on the side of a flat bed without bedrails? 3 (P)   -TR 2  -ALISSA     Moving to and from a bed to a chair (including a wheelchair)? 3 (P)   -TR 3  -ALISSA     Standing up from a chair using your arms (e.g., wheelchair, bedside chair)? 3 (P)   -TR 3  -ALISSA     Climbing 3-5 steps with a railing? 1 (P)   -TR 1  -ALISSA     To walk in hospital room? 2 (P)   -TR 3  -ALISSA     AM-PAC 6 Clicks Score (PT) 15 (P)   -TR 15  -ALISSA     Highest Level of Mobility Goal 4 --> Transfer to chair/commode (P)   -TR 4 --> Transfer to chair/commode  -ALISSA        Functional Assessment    Outcome Measure Options AM-PAC 6 Clicks Basic Mobility (PT) (P)   -TR AM-PAC 6 Clicks Basic Mobility (PT)  -ALISSA               User Key  (r) = Recorded By, (t) = Taken By, (c) = Cosigned By      Initials Name Provider Type    ALISSA Bob Smart, PT Physical Therapist    Yuri Rodriguez, PT Student PT Student                     Time Calculation:    PT Charges       Row Name 08/01/24 1053             Time Calculation    PT Received On 08/01/24 (P)   -TR         Timed Charges    55950 - PT Therapeutic Exercise Minutes 8 (P)   -TR      53738 - Gait Training Minutes  8 (P)   -TR      57608 - PT Therapeutic Activity Minutes 8 (P)   -TR         Total Minutes    Timed Charges Total Minutes 24 (P)   -TR       Total Minutes 24 (P)   -TR                User Key  (r) = Recorded By, (t) = Taken By, (c) = Cosigned By      Initials Name Provider Type    Yuri Rodriguez, PT Student PT Student                  Therapy Charges for Today       Code Description Service Date Service Provider Modifiers Qty    42626234906 HC GAIT TRAINING EA 15 MIN 8/1/2024 Yuri Nelson, PT Student GP 1    44057111562 HC PT THER  PROC EA 15 MIN 8/1/2024 Yuri Nelson, PT Student GP 1            PT G-Codes  Outcome Measure Options: (P) AM-PAC 6 Clicks Basic Mobility (PT)  AM-PAC 6 Clicks Score (PT): (P) 15  AM-PAC 6 Clicks Score (OT): 10    Yuri Nelson, PT Student  8/1/2024

## 2024-08-01 NOTE — PROGRESS NOTES
[facility]     Progress Note    Patient Name: Danielle Granda  : 1940  MRN: [id number]  Primary Care Physician:  Provider, No Known  Date of admission: 2024      Subjective   Brief summary.  Patient admitted post fall, sustained hip fracture.  Postsurgery, postop day #2      HPI:  Patient more awake and alert.  No dizziness.  Patient was on Cardizem high-dose 90 mg short acting once a day.  Discontinued yesterday.  Heart rate slightly high ..  Hemoglobin dropping down.  Patient denies any chest pain but feels weak and tired.    Review of Systems     No chest pain no shortness of breath, no palpitations reported.  Dizziness and lightheadedness on standing.  Weakness.      Objective     Vitals:   Temp:  [97.2 °F (36.2 °C)-98.2 °F (36.8 °C)] 97.2 °F (36.2 °C)  Heart Rate:  [73-96] 96  Resp:  [14-20] 18  BP: ()/(53-88) 156/80  Flow (L/min):  [1-2] 2    Physical Exam :     Elderly female not in acute distress.  Heart regular.  Lungs clear.  Abdomen soft.  Nontender.  Extremities no edema.  Neuro awake alert and oriented      Result Review:  I have personally reviewed the results from the time of this admission to 2024 10:43 EDT and agree with these findings:  [x]  Laboratory  []  Microbiology  [x]  Radiology  []  EKG/Telemetry   []  Cardiology/Vascular   []  Pathology  []  Old records  []  Other:           Assessment / Plan       Active Hospital Problems:  Active Hospital Problems    Diagnosis     Postoperative anemia due to acute blood loss     Essential hypertension     Closed intertrochanteric fracture of hip, left, initial encounter     Intertrochanteric fracture of left femur     Acute urinary retention     COPD (chronic obstructive pulmonary disease)     Fall        Plan:   Patient was on high-dose Cardizem short-acting once a day.  Mostly problems stemming from high-dose of Cardizem 90 mg without any subsequent follow-up dose.  At this point I have discontinued Cardizem, will start  Toprol to control heart rate.    Patient is symptomatic with her anemia, short of breath, dizzy.  Will transfuse 2 unit.    Patient on fluids as well as receiving blood transfusion will give her Lasix discontinue fluids.    Increase activity with PT OT.  Discussed with patient's son at bedside.    Discharge planning team updated possible discharge tomorrow if remains stable       VTE Prophylaxis:  Pharmacologic & mechanical VTE prophylaxis orders are present.        CODE STATUS:   Level Of Support Discussed With: Patient  Code Status (Patient has no pulse and is not breathing): CPR (Attempt to Resuscitate)  Medical Interventions (Patient has pulse or is breathing): Full Support            Electronically signed by Santo Caretr MD, 08/01/24, 10:45 AM EDT.

## 2024-08-01 NOTE — PLAN OF CARE
Goal Outcome Evaluation:  Plan of Care Reviewed With: patient, daughter        Progress: improving  Outcome Evaluation: Pt. VSS, pain well controlled with ordered pain medication. Family at bedside. Pt remains on 2 L per NC for O2 needs. Pt. was able to void a total of 325mL this shift. Bladder scan resulted in 0mL as of 0445. Phosphorus level called to Dr. Carter. Neurovascular checks remain intact.

## 2024-08-02 LAB
ANION GAP SERPL CALCULATED.3IONS-SCNC: 9.5 MMOL/L (ref 5–15)
BACTERIA UR QL AUTO: ABNORMAL /HPF
BASOPHILS # BLD AUTO: 0.02 10*3/MM3 (ref 0–0.2)
BASOPHILS NFR BLD AUTO: 0.2 % (ref 0–1.5)
BH BB BLOOD EXPIRATION DATE: NORMAL
BH BB BLOOD TYPE BARCODE: 5100
BH BB DISPENSE STATUS: NORMAL
BH BB PRODUCT CODE: NORMAL
BH BB UNIT NUMBER: NORMAL
BILIRUB UR QL STRIP: NEGATIVE
BUN SERPL-MCNC: 16 MG/DL (ref 8–23)
BUN/CREAT SERPL: 30.2 (ref 7–25)
CALCIUM SPEC-SCNC: 9.3 MG/DL (ref 8.6–10.5)
CHLORIDE SERPL-SCNC: 104 MMOL/L (ref 98–107)
CLARITY UR: ABNORMAL
CO2 SERPL-SCNC: 24.5 MMOL/L (ref 22–29)
COLOR UR: ABNORMAL
CREAT SERPL-MCNC: 0.53 MG/DL (ref 0.57–1)
CROSSMATCH INTERPRETATION: NORMAL
DEPRECATED RDW RBC AUTO: 57.4 FL (ref 37–54)
EGFRCR SERPLBLD CKD-EPI 2021: 91.3 ML/MIN/1.73
EOSINOPHIL # BLD AUTO: 0.02 10*3/MM3 (ref 0–0.4)
EOSINOPHIL NFR BLD AUTO: 0.2 % (ref 0.3–6.2)
ERYTHROCYTE [DISTWIDTH] IN BLOOD BY AUTOMATED COUNT: 16.5 % (ref 12.3–15.4)
GLUCOSE SERPL-MCNC: 96 MG/DL (ref 65–99)
GLUCOSE UR STRIP-MCNC: NEGATIVE MG/DL
HCT VFR BLD AUTO: 29.9 % (ref 34–46.6)
HGB BLD-MCNC: 9.9 G/DL (ref 12–15.9)
HGB UR QL STRIP.AUTO: NEGATIVE
HYALINE CASTS UR QL AUTO: ABNORMAL /LPF
IMM GRANULOCYTES # BLD AUTO: 0.13 10*3/MM3 (ref 0–0.05)
IMM GRANULOCYTES NFR BLD AUTO: 1.4 % (ref 0–0.5)
KETONES UR QL STRIP: ABNORMAL
LEUKOCYTE ESTERASE UR QL STRIP.AUTO: ABNORMAL
LYMPHOCYTES # BLD AUTO: 1.18 10*3/MM3 (ref 0.7–3.1)
LYMPHOCYTES NFR BLD AUTO: 12.5 % (ref 19.6–45.3)
MAGNESIUM SERPL-MCNC: 1.7 MG/DL (ref 1.6–2.4)
MCH RBC QN AUTO: 31.3 PG (ref 26.6–33)
MCHC RBC AUTO-ENTMCNC: 33.1 G/DL (ref 31.5–35.7)
MCV RBC AUTO: 94.6 FL (ref 79–97)
MONOCYTES # BLD AUTO: 0.58 10*3/MM3 (ref 0.1–0.9)
MONOCYTES NFR BLD AUTO: 6.1 % (ref 5–12)
NEUTROPHILS NFR BLD AUTO: 7.52 10*3/MM3 (ref 1.7–7)
NEUTROPHILS NFR BLD AUTO: 79.6 % (ref 42.7–76)
NITRITE UR QL STRIP: NEGATIVE
NRBC BLD AUTO-RTO: 0 /100 WBC (ref 0–0.2)
PH UR STRIP.AUTO: 5.5 [PH] (ref 5–8)
PHOSPHATE SERPL-MCNC: 2.2 MG/DL (ref 2.5–4.5)
PHOSPHATE SERPL-MCNC: 2.7 MG/DL (ref 2.5–4.5)
PLATELET # BLD AUTO: 191 10*3/MM3 (ref 140–450)
PMV BLD AUTO: 11.5 FL (ref 6–12)
POTASSIUM SERPL-SCNC: 3.8 MMOL/L (ref 3.5–5.2)
PROT UR QL STRIP: NEGATIVE
RBC # BLD AUTO: 3.16 10*6/MM3 (ref 3.77–5.28)
RBC # UR STRIP: ABNORMAL /HPF
REF LAB TEST METHOD: ABNORMAL
SODIUM SERPL-SCNC: 138 MMOL/L (ref 136–145)
SP GR UR STRIP: 1.03 (ref 1–1.03)
SQUAMOUS #/AREA URNS HPF: ABNORMAL /HPF
UNIT  ABO: NORMAL
UNIT  RH: NORMAL
UROBILINOGEN UR QL STRIP: ABNORMAL
WBC # UR STRIP: ABNORMAL /HPF
WBC NRBC COR # BLD AUTO: 9.45 10*3/MM3 (ref 3.4–10.8)
YEAST URNS QL MICRO: ABNORMAL /HPF

## 2024-08-02 PROCEDURE — 94664 DEMO&/EVAL PT USE INHALER: CPT

## 2024-08-02 PROCEDURE — 83735 ASSAY OF MAGNESIUM: CPT | Performed by: ORTHOPAEDIC SURGERY

## 2024-08-02 PROCEDURE — 80048 BASIC METABOLIC PNL TOTAL CA: CPT | Performed by: ORTHOPAEDIC SURGERY

## 2024-08-02 PROCEDURE — 84100 ASSAY OF PHOSPHORUS: CPT | Performed by: ORTHOPAEDIC SURGERY

## 2024-08-02 PROCEDURE — 97116 GAIT TRAINING THERAPY: CPT

## 2024-08-02 PROCEDURE — 85025 COMPLETE CBC W/AUTO DIFF WBC: CPT | Performed by: ORTHOPAEDIC SURGERY

## 2024-08-02 PROCEDURE — 63710000001 ONDANSETRON ODT 4 MG TABLET DISPERSIBLE: Performed by: ORTHOPAEDIC SURGERY

## 2024-08-02 PROCEDURE — 94799 UNLISTED PULMONARY SVC/PX: CPT

## 2024-08-02 PROCEDURE — 25010000002 ENOXAPARIN PER 10 MG: Performed by: INTERNAL MEDICINE

## 2024-08-02 PROCEDURE — 84100 ASSAY OF PHOSPHORUS: CPT | Performed by: INTERNAL MEDICINE

## 2024-08-02 PROCEDURE — 94761 N-INVAS EAR/PLS OXIMETRY MLT: CPT

## 2024-08-02 PROCEDURE — 81001 URINALYSIS AUTO W/SCOPE: CPT | Performed by: INTERNAL MEDICINE

## 2024-08-02 RX ORDER — METOPROLOL SUCCINATE 50 MG/1
50 TABLET, EXTENDED RELEASE ORAL
Status: DISCONTINUED | OUTPATIENT
Start: 2024-08-03 | End: 2024-08-05

## 2024-08-02 RX ORDER — AMLODIPINE BESYLATE 5 MG/1
5 TABLET ORAL
Status: DISCONTINUED | OUTPATIENT
Start: 2024-08-02 | End: 2024-08-06 | Stop reason: HOSPADM

## 2024-08-02 RX ORDER — METOPROLOL SUCCINATE 25 MG/1
25 TABLET, EXTENDED RELEASE ORAL ONCE
Status: DISCONTINUED | OUTPATIENT
Start: 2024-08-02 | End: 2024-08-05

## 2024-08-02 RX ADMIN — ENOXAPARIN SODIUM 30 MG: 100 INJECTION SUBCUTANEOUS at 09:18

## 2024-08-02 RX ADMIN — BUDESONIDE 0.5 MG: 0.5 INHALANT ORAL at 20:46

## 2024-08-02 RX ADMIN — NICOTINE 1 PATCH: 21 PATCH, EXTENDED RELEASE TRANSDERMAL at 12:20

## 2024-08-02 RX ADMIN — TAMSULOSIN HYDROCHLORIDE 0.4 MG: 0.4 CAPSULE ORAL at 21:18

## 2024-08-02 RX ADMIN — ONDANSETRON 4 MG: 4 TABLET, ORALLY DISINTEGRATING ORAL at 11:52

## 2024-08-02 RX ADMIN — PRAVASTATIN SODIUM 40 MG: 20 TABLET ORAL at 21:18

## 2024-08-02 RX ADMIN — ARFORMOTEROL TARTRATE 15 MCG: 15 SOLUTION RESPIRATORY (INHALATION) at 20:46

## 2024-08-02 RX ADMIN — TRAMADOL HYDROCHLORIDE 50 MG: 50 TABLET ORAL at 05:40

## 2024-08-02 RX ADMIN — SENNOSIDES AND DOCUSATE SODIUM 2 TABLET: 50; 8.6 TABLET ORAL at 21:18

## 2024-08-02 RX ADMIN — ACETAMINOPHEN 1000 MG: 500 TABLET ORAL at 21:18

## 2024-08-02 RX ADMIN — ARFORMOTEROL TARTRATE 15 MCG: 15 SOLUTION RESPIRATORY (INHALATION) at 09:57

## 2024-08-02 RX ADMIN — AMLODIPINE BESYLATE 5 MG: 5 TABLET ORAL at 11:52

## 2024-08-02 RX ADMIN — BUDESONIDE 0.5 MG: 0.5 INHALANT ORAL at 09:57

## 2024-08-02 RX ADMIN — FERROUS SULFATE TAB 325 MG (65 MG ELEMENTAL FE) 325 MG: 325 (65 FE) TAB at 09:17

## 2024-08-02 RX ADMIN — Medication 10 ML: at 09:18

## 2024-08-02 RX ADMIN — CYANOCOBALAMIN TAB 500 MCG 1000 MCG: 500 TAB at 09:17

## 2024-08-02 RX ADMIN — ACETAMINOPHEN 1000 MG: 500 TABLET ORAL at 09:15

## 2024-08-02 RX ADMIN — ACETAMINOPHEN 1000 MG: 500 TABLET ORAL at 17:09

## 2024-08-02 RX ADMIN — Medication 10 ML: at 21:35

## 2024-08-02 RX ADMIN — FERROUS SULFATE TAB 325 MG (65 MG ELEMENTAL FE) 325 MG: 325 (65 FE) TAB at 17:09

## 2024-08-02 RX ADMIN — METOPROLOL SUCCINATE 50 MG: 50 TABLET, EXTENDED RELEASE ORAL at 09:20

## 2024-08-02 RX ADMIN — Medication 10 ML: at 21:34

## 2024-08-02 RX ADMIN — ASPIRIN 81 MG: 81 TABLET, COATED ORAL at 09:15

## 2024-08-02 RX ADMIN — POTASSIUM & SODIUM PHOSPHATES POWDER PACK 280-160-250 MG 2 PACKET: 280-160-250 PACK at 06:20

## 2024-08-02 RX ADMIN — BETHANECHOL CHLORIDE 10 MG: 10 TABLET ORAL at 09:15

## 2024-08-02 RX ADMIN — SERTRALINE HYDROCHLORIDE 25 MG: 25 TABLET ORAL at 09:15

## 2024-08-02 NOTE — THERAPY TREATMENT NOTE
Acute Care - Physical Therapy Progress Note   [place]     Patient Name: Danielle Granda  : 1940  MRN: [id number]  Today's Date: 2024      Visit Dx:     ICD-10-CM ICD-9-CM   1. Closed intertrochanteric fracture of hip, left, initial encounter  S72.142A 820.21   2. Difficulty in walking  R26.2 719.7     Patient Active Problem List   Diagnosis    Fracture    Closed intertrochanteric fracture of hip, left, initial encounter    Intertrochanteric fracture of left femur    Acute urinary retention    COPD (chronic obstructive pulmonary disease)    Fall    Postoperative anemia due to acute blood loss    Essential hypertension     Past Medical History:   Diagnosis Date    Anxiety     Hyperlipidemia     Hypertension      Past Surgical History:   Procedure Laterality Date    HIP INTERTROCHANTERIC NAILING Left 2024    Procedure: HIP INTERTROCHANTERIC NAILING;  Surgeon: Dutch Farmer MD;  Location: [facility] OR;  Service: Orthopedics;  Laterality: Left;    HYSTERECTOMY       PT Assessment (Last 12 Hours)       PT Evaluation and Treatment       Row Name 24 1600          Physical Therapy Time and Intention    Subjective Information no complaints  -CS     Document Type therapy note (daily note)  -CS     Mode of Treatment individual therapy;physical therapy  -CS     Patient Effort good  -CS     Symptoms Noted During/After Treatment none  -CS       Row Name 24 1600          Pain    Pretreatment Pain Rating 0/10 - no pain  -CS     Posttreatment Pain Rating 0/10 - no pain  -CS       Row Name 24 1600          Bed Mobility    Scooting/Bridging [place] (Bed Mobility) verbal cues;minimum assist (75% patient effort);1 person assist  -CS     Supine-Sit [place] (Bed Mobility) minimum assist (75% patient effort);1 person assist;verbal cues  -CS     Bed Mobility, Safety Issues decreased use of arms for pushing/pulling;decreased use of legs for bridging/pushing  -CS     Assistive Device (Bed  Mobility) bed rails;draw sheet;head of bed elevated  -CS       Row Name 08/02/24 1600          Sit-Stand Transfer    Sit-Stand [place] (Transfers) verbal cues;contact guard;1 person assist  -CS     Assistive Device (Sit-Stand Transfers) walker, front-wheeled  -CS       Row Name 08/02/24 1600          Stand-Sit Transfer    Stand-Sit [place] (Transfers) verbal cues;contact guard;1 person assist  -CS     Assistive Device (Stand-Sit Transfers) walker, front-wheeled  -CS       Row Name 08/02/24 1600          Gait/Stairs (Locomotion)    [place] Level (Gait) verbal cues;contact guard;1 person assist  -CS     Assistive Device (Gait) walker, front-wheeled  -CS     Distance in Feet (Gait) 22  -CS     Pattern (Gait) 4-point;step-to  -CS     Deviations/Abnormal Patterns (Gait) base of support, narrow;festinating/shuffling;gait speed decreased;stride length decreased  -CS     Bilateral Gait Deviations forward flexed posture  -CS       Row Name             Wound 07/30/24 1655 Left anterior hip Incision    Wound - Properties Group Placement Date: 07/30/24  -SP Placement Time: 1655  -SP Present on Original Admission: N  -SP Side: Left  -SP Orientation: anterior  -SP Location: hip  -SP Primary Wound Type: Incision  -SP    Retired Wound - Properties Group Placement Date: 07/30/24  -SP Placement Time: 1655 -SP Present on Original Admission: N  -SP Side: Left  -SP Orientation: anterior  -SP Location: hip  -SP Primary Wound Type: Incision  -SP    Retired Wound - Properties Group Date first assessed: 07/30/24  -SP Time first assessed: 1655 -SP Present on Original Admission: N  -SP Side: Left  -SP Location: hip  -SP Primary Wound Type: Incision  -SP      Row Name 08/02/24 1600          Positioning and Restraints    Pre-Treatment Position in bed  -CS     Post Treatment Position chair  -CS     In Chair sitting;call light within reach;encouraged to call for assist;exit alarm on;with family/caregiver  -CS       Row Name  08/02/24 1600          Progress Summary (PT)    Progress Toward Functional Goals (PT) progress toward functional goals is good  -CS               User Key  (r) = Recorded By, (t) = Taken By, (c) = Cosigned By      Initials Name Provider Type    Kevin Walton PTA Physical Therapist Assistant    Suzette Oliver RN Registered Nurse                      PT Recommendation and Plan     Progress Summary (PT)  Progress Toward Functional Goals (PT): progress toward functional goals is good   Outcome Measures       Row Name 08/02/24 1600 08/01/24 1100 07/31/24 1100       How much help from another person do you currently need...    Turning from your back to your side while in flat bed without using bedrails? 3  -CS 3  -DP (r) TR (t) DP (c) 3  -ALISSA    Moving from lying on back to sitting on the side of a flat bed without bedrails? 3  -CS 3  -DP (r) TR (t) DP (c) 2  -ALISSA    Moving to and from a bed to a chair (including a wheelchair)? 3  -CS 3  -DP (r) TR (t) DP (c) 3  -ALISSA    Standing up from a chair using your arms (e.g., wheelchair, bedside chair)? 3  -CS 3  -DP (r) TR (t) DP (c) 3  -ALISSA    Climbing 3-5 steps with a railing? 2  -CS 1  -DP (r) TR (t) DP (c) 1  -ALISSA    To walk in hospital room? 3  -CS 2  -DP (r) TR (t) DP (c) 3  -ALISSA    AM-PAC 6 Clicks Score (PT) 17  -CS 15  -DP (r) TR (t) 15  -ALISSA    Highest Level of Mobility Goal 5 --> Static standing  -CS 4 --> Transfer to chair/commode  -DP (r) TR (t) 4 --> Transfer to chair/commode  -ALISSA       Functional Assessment    Outcome Measure Options AM-PAC 6 Clicks Basic Mobility (PT)  -CS AM-PAC 6 Clicks Basic Mobility (PT)  -DP (r) TR (t) DP (c) AM-PAC 6 Clicks Basic Mobility (PT)  -ALISSA              User Key  (r) = Recorded By, (t) = Taken By, (c) = Cosigned By      Initials Name Provider Type    DP Akilah Espinosa, PT Physical Therapist    Bob York, PT Physical Therapist    Kevin Walton, CRYSTAL Physical Therapist Assistant    HERNANDO Nelson,  Yuri, PT Student PT Student                     Time Calculation:    PT Charges       Row Name 08/02/24 1651             Time Calculation    Start Time 1451  -CS      PT Received On 08/02/24  -CS         Timed Charges    81760 - Gait Training Minutes  10  -CS      65576 - PT Therapeutic Activity Minutes 5  -CS         Total Minutes    Timed Charges Total Minutes 15  -CS       Total Minutes 15  -CS                User Key  (r) = Recorded By, (t) = Taken By, (c) = Cosigned By      Initials Name Provider Type    CS Kevin Michaels PTA Physical Therapist Assistant                  Therapy Charges for Today       Code Description Service Date Service Provider Modifiers Qty    77184043762 HC GAIT TRAINING EA 15 MIN 8/2/2024 Kevin Michaels PTA GP 1            PT G-Codes  Outcome Measure Options: AM-PAC 6 Clicks Basic Mobility (PT)  AM-PAC 6 Clicks Score (PT): 17  AM-PAC 6 Clicks Score (OT): 10    Kevin Michaels PTA  8/2/2024

## 2024-08-02 NOTE — PLAN OF CARE
Goal Outcome Evaluation:  Plan of Care Reviewed With: patient, daughter        Progress: no change  Outcome Evaluation: Pt. VSS, A & O X 4 at beggining of shift. Pt. did have episode of confusion at 0400 this shift, family at bedside. Hemoglobin improving. Pt. having dark BM's this shift. Neurovascular checks remain intact.

## 2024-08-02 NOTE — PROGRESS NOTES
[facility]     Progress Note    Patient Name: Danielle Granda  : 1940  MRN: [id number]  Primary Care Physician:  Provider, No Known  Date of admission: 2024      Subjective   Brief summary.  Patient admitted post fall, sustained hip fracture.  Postsurgery, postop day #3      HPI:  Awake and alert, still having pain, no shortness of breath no chest pain.  Blood pressure high.  Family at bedside, patient was living in assisted living and active prior to this event.  Family prefers i her to go to inpatient rehab.  Insurance requested peer to peer review  Patient able to void okay now.  No urinary retention    Review of Systems     No chest pain no shortness of breath, no palpitations reported.  No chest pain, weakness, pain in the hip.      Objective     Vitals:   Temp:  [97.3 °F (36.3 °C)-98.8 °F (37.1 °C)] 98.8 °F (37.1 °C)  Heart Rate:  [84-97] 88  Resp:  [16-20] 18  BP: (124-186)/(69-95) 186/95  Flow (L/min):  [2] 2    Physical Exam :     Elderly female not in acute distress.  Heart regular.  Lungs clear.  Abdomen soft.  Nontender.  Extremities no edema.  Neuro awake alert and oriented      Result Review:  I have personally reviewed the results from the time of this admission to 2024 09:56 EDT and agree with these findings:  [x]  Laboratory  []  Microbiology  [x]  Radiology  []  EKG/Telemetry   []  Cardiology/Vascular   []  Pathology  []  Old records  []  Other:    Reviewed      Assessment / Plan       Active Hospital Problems:  Active Hospital Problems    Diagnosis     Postoperative anemia due to acute blood loss     Essential hypertension     Closed intertrochanteric fracture of hip, left, initial encounter     Intertrochanteric fracture of left femur     Acute urinary retention     COPD (chronic obstructive pulmonary disease)     Fall        Plan:   Stable and improving, urinary retention resolved discontinue Urecholine, continue Flomax, heart rate and blood pressure high increase Toprol  to 50.  Patient weak and able to carry on her ADLs.  Prefers to go to inpatient rehab.(  Peer to peer review requested, I spent half an hour scheduling peer to peer review going through different operators for almost half an hour and scheduled for 1030, I was called again at 9:51 AM reporting that cannot be done and it has to be done either later in the evening as the doctor is not available or may be later.  I had to reschedule my patients because of this meeting, I told him that I am available till 1:00 but no appointment was given.).  Social service to address discharge planning patient's family    VTE Prophylaxis:  Pharmacologic & mechanical VTE prophylaxis orders are present.        CODE STATUS:   Level Of Support Discussed With: Patient  Code Status (Patient has no pulse and is not breathing): CPR (Attempt to Resuscitate)  Medical Interventions (Patient has pulse or is breathing): Full Support              Electronically signed by Santo Carter MD, 08/02/24, 9:59 AM EDT.        I was called back again for peer to peer review.  After detailed discussion patient was approved for acute inpatient rehab.  Patient's antihypertensive being adjusted, to discharge to inpatient rehab tomorrow           Electronically signed by Santo Carter MD, 08/02/24, 11:14 AM EDT.

## 2024-08-03 LAB
ANION GAP SERPL CALCULATED.3IONS-SCNC: 9.6 MMOL/L (ref 5–15)
BASOPHILS # BLD AUTO: 0.02 10*3/MM3 (ref 0–0.2)
BASOPHILS NFR BLD AUTO: 0.3 % (ref 0–1.5)
BUN SERPL-MCNC: 15 MG/DL (ref 8–23)
BUN/CREAT SERPL: 31.3 (ref 7–25)
CALCIUM SPEC-SCNC: 9.2 MG/DL (ref 8.6–10.5)
CHLORIDE SERPL-SCNC: 101 MMOL/L (ref 98–107)
CO2 SERPL-SCNC: 26.4 MMOL/L (ref 22–29)
CREAT SERPL-MCNC: 0.48 MG/DL (ref 0.57–1)
DEPRECATED RDW RBC AUTO: 53.5 FL (ref 37–54)
EGFRCR SERPLBLD CKD-EPI 2021: 93.5 ML/MIN/1.73
EOSINOPHIL # BLD AUTO: 0.08 10*3/MM3 (ref 0–0.4)
EOSINOPHIL NFR BLD AUTO: 1.1 % (ref 0.3–6.2)
ERYTHROCYTE [DISTWIDTH] IN BLOOD BY AUTOMATED COUNT: 15.5 % (ref 12.3–15.4)
GLUCOSE SERPL-MCNC: 76 MG/DL (ref 65–99)
HCT VFR BLD AUTO: 26.5 % (ref 34–46.6)
HGB BLD-MCNC: 8.6 G/DL (ref 12–15.9)
IMM GRANULOCYTES # BLD AUTO: 0.06 10*3/MM3 (ref 0–0.05)
IMM GRANULOCYTES NFR BLD AUTO: 0.8 % (ref 0–0.5)
LYMPHOCYTES # BLD AUTO: 0.99 10*3/MM3 (ref 0.7–3.1)
LYMPHOCYTES NFR BLD AUTO: 13.8 % (ref 19.6–45.3)
MAGNESIUM SERPL-MCNC: 1.6 MG/DL (ref 1.6–2.4)
MCH RBC QN AUTO: 31.3 PG (ref 26.6–33)
MCHC RBC AUTO-ENTMCNC: 32.5 G/DL (ref 31.5–35.7)
MCV RBC AUTO: 96.4 FL (ref 79–97)
MONOCYTES # BLD AUTO: 0.49 10*3/MM3 (ref 0.1–0.9)
MONOCYTES NFR BLD AUTO: 6.8 % (ref 5–12)
NEUTROPHILS NFR BLD AUTO: 5.55 10*3/MM3 (ref 1.7–7)
NEUTROPHILS NFR BLD AUTO: 77.2 % (ref 42.7–76)
NRBC BLD AUTO-RTO: 0 /100 WBC (ref 0–0.2)
PHOSPHATE SERPL-MCNC: 2.9 MG/DL (ref 2.5–4.5)
PLATELET # BLD AUTO: 173 10*3/MM3 (ref 140–450)
PMV BLD AUTO: 11.3 FL (ref 6–12)
POTASSIUM SERPL-SCNC: 3.6 MMOL/L (ref 3.5–5.2)
RBC # BLD AUTO: 2.75 10*6/MM3 (ref 3.77–5.28)
SODIUM SERPL-SCNC: 137 MMOL/L (ref 136–145)
WBC NRBC COR # BLD AUTO: 7.19 10*3/MM3 (ref 3.4–10.8)

## 2024-08-03 PROCEDURE — 83735 ASSAY OF MAGNESIUM: CPT | Performed by: ORTHOPAEDIC SURGERY

## 2024-08-03 PROCEDURE — 84100 ASSAY OF PHOSPHORUS: CPT | Performed by: ORTHOPAEDIC SURGERY

## 2024-08-03 PROCEDURE — 85025 COMPLETE CBC W/AUTO DIFF WBC: CPT | Performed by: ORTHOPAEDIC SURGERY

## 2024-08-03 PROCEDURE — 25010000002 ENOXAPARIN PER 10 MG: Performed by: INTERNAL MEDICINE

## 2024-08-03 PROCEDURE — 80048 BASIC METABOLIC PNL TOTAL CA: CPT | Performed by: ORTHOPAEDIC SURGERY

## 2024-08-03 PROCEDURE — 97530 THERAPEUTIC ACTIVITIES: CPT

## 2024-08-03 PROCEDURE — 94799 UNLISTED PULMONARY SVC/PX: CPT

## 2024-08-03 PROCEDURE — 94761 N-INVAS EAR/PLS OXIMETRY MLT: CPT

## 2024-08-03 PROCEDURE — 97110 THERAPEUTIC EXERCISES: CPT

## 2024-08-03 RX ADMIN — ARFORMOTEROL TARTRATE 15 MCG: 15 SOLUTION RESPIRATORY (INHALATION) at 20:26

## 2024-08-03 RX ADMIN — ACETAMINOPHEN 1000 MG: 500 TABLET ORAL at 22:49

## 2024-08-03 RX ADMIN — TAMSULOSIN HYDROCHLORIDE 0.4 MG: 0.4 CAPSULE ORAL at 22:50

## 2024-08-03 RX ADMIN — FERROUS SULFATE TAB 325 MG (65 MG ELEMENTAL FE) 325 MG: 325 (65 FE) TAB at 07:48

## 2024-08-03 RX ADMIN — ACETAMINOPHEN 1000 MG: 500 TABLET ORAL at 10:05

## 2024-08-03 RX ADMIN — ASPIRIN 81 MG: 81 TABLET, COATED ORAL at 10:03

## 2024-08-03 RX ADMIN — AMLODIPINE BESYLATE 5 MG: 5 TABLET ORAL at 10:03

## 2024-08-03 RX ADMIN — CYANOCOBALAMIN TAB 500 MCG 1000 MCG: 500 TAB at 10:05

## 2024-08-03 RX ADMIN — ACETAMINOPHEN 1000 MG: 500 TABLET ORAL at 17:25

## 2024-08-03 RX ADMIN — METOPROLOL SUCCINATE 50 MG: 50 TABLET, EXTENDED RELEASE ORAL at 10:03

## 2024-08-03 RX ADMIN — FERROUS SULFATE TAB 325 MG (65 MG ELEMENTAL FE) 325 MG: 325 (65 FE) TAB at 17:25

## 2024-08-03 RX ADMIN — BUDESONIDE 0.5 MG: 0.5 INHALANT ORAL at 20:26

## 2024-08-03 RX ADMIN — AVOBENZONE, HOMOSALATE, OCTISALATE, OCTOCRYLENE, AND OXYBENZONE 1 PACKET: 29.4; 147; 49; 25.4; 58.8 LOTION TOPICAL at 10:05

## 2024-08-03 RX ADMIN — Medication 10 ML: at 22:50

## 2024-08-03 RX ADMIN — PRAVASTATIN SODIUM 40 MG: 20 TABLET ORAL at 22:50

## 2024-08-03 RX ADMIN — BUDESONIDE 0.5 MG: 0.5 INHALANT ORAL at 08:18

## 2024-08-03 RX ADMIN — NICOTINE 1 PATCH: 21 PATCH, EXTENDED RELEASE TRANSDERMAL at 10:06

## 2024-08-03 RX ADMIN — SERTRALINE HYDROCHLORIDE 25 MG: 25 TABLET ORAL at 10:03

## 2024-08-03 RX ADMIN — ARFORMOTEROL TARTRATE 15 MCG: 15 SOLUTION RESPIRATORY (INHALATION) at 08:19

## 2024-08-03 RX ADMIN — ENOXAPARIN SODIUM 30 MG: 100 INJECTION SUBCUTANEOUS at 10:06

## 2024-08-03 RX ADMIN — Medication 10 ML: at 10:07

## 2024-08-03 NOTE — THERAPY TREATMENT NOTE
Acute Care - Physical Therapy Treatment Note   [place]     Patient Name: Danielle Granda  : 1940  MRN: [id number]  Today's Date: 8/3/2024      Visit Dx:     ICD-10-CM ICD-9-CM   1. Closed intertrochanteric fracture of hip, left, initial encounter  S72.142A 820.21   2. Difficulty in walking  R26.2 719.7     Patient Active Problem List   Diagnosis    Fracture    Closed intertrochanteric fracture of hip, left, initial encounter    Intertrochanteric fracture of left femur    Acute urinary retention    COPD (chronic obstructive pulmonary disease)    Fall    Postoperative anemia due to acute blood loss    Essential hypertension     Past Medical History:   Diagnosis Date    Anxiety     Hyperlipidemia     Hypertension      Past Surgical History:   Procedure Laterality Date    HIP INTERTROCHANTERIC NAILING Left 2024    Procedure: HIP INTERTROCHANTERIC NAILING;  Surgeon: Dutch Farmer MD;  Location: [facility] OR;  Service: Orthopedics;  Laterality: Left;    HYSTERECTOMY       PT Assessment (Last 12 Hours)       PT Evaluation and Treatment       Row Name 24 1551          Physical Therapy Time and Intention    Subjective Information complains of;pain;dizziness  -TS     Document Type therapy note (daily note)  -TS     Mode of Treatment individual therapy;physical therapy  -TS       Row Name 24 1551          Pain    Pretreatment Pain Rating 2/10  -TS     Pain Location - Side/Orientation Left  -TS     Pain Location - hip  -TS     Pain Intervention(s) Cold applied;Repositioned  -TS       Row Name 24 1551          Cognition    Affect/Mental Status (Cognition) WFL  -TS       Row Name 24 1551          Mobility    Extremity Weight-bearing Status left lower extremity  -TS     Left Lower Extremity (Weight-bearing Status) weight-bearing as tolerated (WBAT)  -TS       Row Name 24 1551          Bed Mobility    Bed Mobility bed mobility (all) activities;supine-sit;sit-supine  -TS     All  Activities, [place] (Bed Mobility) minimum assist (75% patient effort);1 person assist;verbal cues  -TS     Supine-Sit [place] (Bed Mobility) minimum assist (75% patient effort);1 person assist;verbal cues  -TS     Sit-Supine [place] (Bed Mobility) minimum assist (75% patient effort);1 person assist;verbal cues  -TS     Bed Mobility, Safety Issues decreased use of arms for pushing/pulling;decreased use of legs for bridging/pushing  -TS     Assistive Device (Bed Mobility) bed rails;draw sheet;head of bed elevated  -TS     Comment, (Bed Mobility) Seated on EOB pt's O2 level dropped to 75%. Instructed pt on diaphragmatic breathing techniques. Pt O2 saturation WNL within <1 min. Deferred further transfers and ambulation.  -TS       Row Name 08/03/24 1551          Safety Issues, Functional Mobility    Impairments Affecting Function (Mobility) balance;pain;strength;range of motion (ROM);endurance/activity tolerance  -TS       Row Name 08/03/24 1551          Motor Skills    Therapeutic Exercise hip;knee;ankle  AAROM LLE x20 reps, supine position. Pt O2 saturation range from 97-75% during exercises. Instructed pt on diaphragmatic breathing and energy conservation techniques.  -TS       Row Name             Wound 07/30/24 1655 Left anterior hip Incision    Wound - Properties Group Placement Date: 07/30/24  -SP Placement Time: 1655  -SP Present on Original Admission: N  -SP Side: Left  -SP Orientation: anterior  -SP Location: hip  -SP Primary Wound Type: Incision  -SP    Retired Wound - Properties Group Placement Date: 07/30/24  -SP Placement Time: 1655  -SP Present on Original Admission: N  -SP Side: Left  -SP Orientation: anterior  -SP Location: hip  -SP Primary Wound Type: Incision  -SP    Retired Wound - Properties Group Date first assessed: 07/30/24  -SP Time first assessed: 1655  -SP Present on Original Admission: N  -SP Side: Left  -SP Location: hip  -SP Primary Wound Type: Incision  -SP      Row Name  08/03/24 1551          Vital Signs    Pre SpO2 (%) 97  -TS     O2 Delivery Pre Treatment nasal cannula  2L  -TS     Intra SpO2 (%) 75  -TS     O2 Delivery Intra Treatment nasal cannula  2L  -TS     Post SpO2 (%) 93  -TS     O2 Delivery Post Treatment nasal cannula  2L  -TS       Row Name 08/03/24 1551          Positioning and Restraints    Pre-Treatment Position in bed  -TS     Post Treatment Position bed  -TS     In Bed supine;call light within reach;exit alarm on;heels elevated  -TS       Row Name 08/03/24 1551          Progress Summary (PT)    Progress Toward Functional Goals (PT) progress toward functional goals is fair  -TS               User Key  (r) = Recorded By, (t) = Taken By, (c) = Cosigned By      Initials Name Provider Type    Bernard Espinosa PTA Physical Therapist Assistant    Suzette Oliver RN Registered Nurse                Left Lower Extremity   Exercise  Reps  Sets    Short arc quads   10 2   Heel slides  10 2   Ankle pumps  10 2   Quad sets  10 2   Glut sets 10 2   Hip ab/adduction 10 2            PT Recommendation and Plan     Progress Summary (PT)  Progress Toward Functional Goals (PT): progress toward functional goals is fair   Outcome Measures       Row Name 08/03/24 1601 08/02/24 1600 08/01/24 1100       How much help from another person do you currently need...    Turning from your back to your side while in flat bed without using bedrails? 3  -TS 3  -CS 3  -DP (r) TR (t) DP (c)    Moving from lying on back to sitting on the side of a flat bed without bedrails? 3  -TS 3  -CS 3  -DP (r) TR (t) DP (c)    Moving to and from a bed to a chair (including a wheelchair)? 3  -TS 3  -CS 3  -DP (r) TR (t) DP (c)    Standing up from a chair using your arms (e.g., wheelchair, bedside chair)? 3  -TS 3  -CS 3  -DP (r) TR (t) DP (c)    Climbing 3-5 steps with a railing? 2  -TS 2  -CS 1  -DP (r) TR (t) DP (c)    To walk in hospital room? 3  -TS 3  -CS 2  -DP (r) TR (t) DP (c)    AM-PAC 6  Clicks Score (PT) 17  -TS 17  -CS 15  -DP (r) TR (t)    Highest Level of Mobility Goal 5 --> Static standing  -TS 5 --> Static standing  -CS 4 --> Transfer to chair/commode  -DP (r) TR (t)       Functional Assessment    Outcome Measure Options -- AM-PAC 6 Clicks Basic Mobility (PT)  -CS AM-PAC 6 Clicks Basic Mobility (PT)  -DP (r) TR (t) DP (c)              User Key  (r) = Recorded By, (t) = Taken By, (c) = Cosigned By      Initials Name Provider Type    TS Bernard Rodriguez, PTA Physical Therapist Assistant    Akilah Santana, PT Physical Therapist    CS Kevin Michaels, CRYSTAL Physical Therapist Assistant    Yuri Rodriguez, PT Student PT Student                     Time Calculation:    PT Charges       Row Name 08/03/24 1546             Time Calculation    PT Received On 08/03/24  -TS      PT Goal Re-Cert Due Date 08/09/24  -TS         Timed Charges    33877 - PT Therapeutic Exercise Minutes 18  -TS      50268 - Gait Training Minutes  --  -TS      80665 - PT Therapeutic Activity Minutes 7  -TS         Total Minutes    Timed Charges Total Minutes 25  -TS       Total Minutes 25  -TS                User Key  (r) = Recorded By, (t) = Taken By, (c) = Cosigned By      Initials Name Provider Type    NANCY Bernard Rodriguez, PTA Physical Therapist Assistant                  Therapy Charges for Today       Code Description Service Date Service Provider Modifiers Qty    57877021899 HC PT THER PROC EA 15 MIN 8/3/2024 Bernard Rodriguez, PTA GP 1    26143699507 HC PT THERAPEUTIC ACT EA 15 MIN 8/3/2024 Bernard Rodriguez PTA GP 1            PT G-Codes  Outcome Measure Options: AM-PAC 6 Clicks Basic Mobility (PT)  AM-PAC 6 Clicks Score (PT): 17  AM-PAC 6 Clicks Score (OT): 10    Bernard Rodriguez PTA  8/3/2024

## 2024-08-03 NOTE — PLAN OF CARE
Goal Outcome Evaluation:  Plan of Care Reviewed With: patient           Outcome Evaluation: Patient alert and oriented x4. No complaints of pain this shift. Planning discharge home tomorrow. 1 assist with gait belt and walker to bedside commode. Pills whole and one at a time. No significant changes this shift.

## 2024-08-03 NOTE — PLAN OF CARE
Goal Outcome Evaluation:  Plan of Care Reviewed With: patient, son           Outcome Evaluation: Patient alert and oriented x 4, calm, pleasant, and cooperative.  VS WNL.  LCTA.  No complaints of pain throughout shift.  Old IV infitrated and new IV placed this shift by IV team, which patient tolerated well.  Patient participated in therapy services this shift and walked the [place] with walker, gait belt, and 1 assist.  Patient up in chair for several hours and up to bedside commode several times throughout the shift. Family at bedside the entire shift and supportive of patient.  Dgs clean, dry, and intact.  Patient in bed sitting upright with call light in reach.

## 2024-08-04 LAB
ANION GAP SERPL CALCULATED.3IONS-SCNC: 8.2 MMOL/L (ref 5–15)
BASOPHILS # BLD AUTO: 0.02 10*3/MM3 (ref 0–0.2)
BASOPHILS NFR BLD AUTO: 0.3 % (ref 0–1.5)
BUN SERPL-MCNC: 17 MG/DL (ref 8–23)
BUN/CREAT SERPL: 35.4 (ref 7–25)
CALCIUM SPEC-SCNC: 8.8 MG/DL (ref 8.6–10.5)
CHLORIDE SERPL-SCNC: 102 MMOL/L (ref 98–107)
CO2 SERPL-SCNC: 26.8 MMOL/L (ref 22–29)
CREAT SERPL-MCNC: 0.48 MG/DL (ref 0.57–1)
DEPRECATED RDW RBC AUTO: 51.5 FL (ref 37–54)
EGFRCR SERPLBLD CKD-EPI 2021: 93.5 ML/MIN/1.73
EOSINOPHIL # BLD AUTO: 0.09 10*3/MM3 (ref 0–0.4)
EOSINOPHIL NFR BLD AUTO: 1.5 % (ref 0.3–6.2)
ERYTHROCYTE [DISTWIDTH] IN BLOOD BY AUTOMATED COUNT: 14.8 % (ref 12.3–15.4)
GLUCOSE SERPL-MCNC: 68 MG/DL (ref 65–99)
HCT VFR BLD AUTO: 25.4 % (ref 34–46.6)
HGB BLD-MCNC: 8.3 G/DL (ref 12–15.9)
IMM GRANULOCYTES # BLD AUTO: 0.06 10*3/MM3 (ref 0–0.05)
IMM GRANULOCYTES NFR BLD AUTO: 1 % (ref 0–0.5)
LYMPHOCYTES # BLD AUTO: 1.04 10*3/MM3 (ref 0.7–3.1)
LYMPHOCYTES NFR BLD AUTO: 17 % (ref 19.6–45.3)
MAGNESIUM SERPL-MCNC: 1.7 MG/DL (ref 1.6–2.4)
MCH RBC QN AUTO: 31.2 PG (ref 26.6–33)
MCHC RBC AUTO-ENTMCNC: 32.7 G/DL (ref 31.5–35.7)
MCV RBC AUTO: 95.5 FL (ref 79–97)
MONOCYTES # BLD AUTO: 0.46 10*3/MM3 (ref 0.1–0.9)
MONOCYTES NFR BLD AUTO: 7.5 % (ref 5–12)
NEUTROPHILS NFR BLD AUTO: 4.46 10*3/MM3 (ref 1.7–7)
NEUTROPHILS NFR BLD AUTO: 72.7 % (ref 42.7–76)
NRBC BLD AUTO-RTO: 0 /100 WBC (ref 0–0.2)
PHOSPHATE SERPL-MCNC: 3.1 MG/DL (ref 2.5–4.5)
PLATELET # BLD AUTO: 193 10*3/MM3 (ref 140–450)
PMV BLD AUTO: 11 FL (ref 6–12)
POTASSIUM SERPL-SCNC: 3.4 MMOL/L (ref 3.5–5.2)
RBC # BLD AUTO: 2.66 10*6/MM3 (ref 3.77–5.28)
SODIUM SERPL-SCNC: 137 MMOL/L (ref 136–145)
WBC NRBC COR # BLD AUTO: 6.13 10*3/MM3 (ref 3.4–10.8)

## 2024-08-04 PROCEDURE — 97530 THERAPEUTIC ACTIVITIES: CPT

## 2024-08-04 PROCEDURE — 94799 UNLISTED PULMONARY SVC/PX: CPT

## 2024-08-04 PROCEDURE — 85025 COMPLETE CBC W/AUTO DIFF WBC: CPT | Performed by: ORTHOPAEDIC SURGERY

## 2024-08-04 PROCEDURE — 94664 DEMO&/EVAL PT USE INHALER: CPT

## 2024-08-04 PROCEDURE — 94761 N-INVAS EAR/PLS OXIMETRY MLT: CPT

## 2024-08-04 PROCEDURE — 80048 BASIC METABOLIC PNL TOTAL CA: CPT | Performed by: ORTHOPAEDIC SURGERY

## 2024-08-04 PROCEDURE — 97116 GAIT TRAINING THERAPY: CPT

## 2024-08-04 PROCEDURE — 97110 THERAPEUTIC EXERCISES: CPT

## 2024-08-04 PROCEDURE — 83735 ASSAY OF MAGNESIUM: CPT | Performed by: ORTHOPAEDIC SURGERY

## 2024-08-04 PROCEDURE — 84100 ASSAY OF PHOSPHORUS: CPT | Performed by: ORTHOPAEDIC SURGERY

## 2024-08-04 PROCEDURE — 25010000002 ENOXAPARIN PER 10 MG: Performed by: INTERNAL MEDICINE

## 2024-08-04 RX ADMIN — PRAVASTATIN SODIUM 40 MG: 20 TABLET ORAL at 21:43

## 2024-08-04 RX ADMIN — AVOBENZONE, HOMOSALATE, OCTISALATE, OCTOCRYLENE, AND OXYBENZONE 1 PACKET: 29.4; 147; 49; 25.4; 58.8 LOTION TOPICAL at 08:18

## 2024-08-04 RX ADMIN — SERTRALINE HYDROCHLORIDE 25 MG: 25 TABLET ORAL at 08:17

## 2024-08-04 RX ADMIN — BUDESONIDE 0.5 MG: 0.5 INHALANT ORAL at 08:53

## 2024-08-04 RX ADMIN — TRAMADOL HYDROCHLORIDE 50 MG: 50 TABLET ORAL at 18:15

## 2024-08-04 RX ADMIN — METOPROLOL SUCCINATE 50 MG: 50 TABLET, EXTENDED RELEASE ORAL at 08:17

## 2024-08-04 RX ADMIN — ACETAMINOPHEN 1000 MG: 500 TABLET ORAL at 08:17

## 2024-08-04 RX ADMIN — Medication 10 ML: at 08:20

## 2024-08-04 RX ADMIN — ASPIRIN 81 MG: 81 TABLET, COATED ORAL at 08:17

## 2024-08-04 RX ADMIN — FERROUS SULFATE TAB 325 MG (65 MG ELEMENTAL FE) 325 MG: 325 (65 FE) TAB at 08:17

## 2024-08-04 RX ADMIN — NICOTINE 1 PATCH: 21 PATCH, EXTENDED RELEASE TRANSDERMAL at 08:21

## 2024-08-04 RX ADMIN — TAMSULOSIN HYDROCHLORIDE 0.4 MG: 0.4 CAPSULE ORAL at 21:42

## 2024-08-04 RX ADMIN — ACETAMINOPHEN 1000 MG: 500 TABLET ORAL at 15:40

## 2024-08-04 RX ADMIN — BUDESONIDE 0.5 MG: 0.5 INHALANT ORAL at 18:40

## 2024-08-04 RX ADMIN — AMLODIPINE BESYLATE 5 MG: 5 TABLET ORAL at 08:17

## 2024-08-04 RX ADMIN — SENNOSIDES AND DOCUSATE SODIUM 2 TABLET: 50; 8.6 TABLET ORAL at 21:42

## 2024-08-04 RX ADMIN — CYANOCOBALAMIN TAB 500 MCG 1000 MCG: 500 TAB at 08:17

## 2024-08-04 RX ADMIN — ARFORMOTEROL TARTRATE 15 MCG: 15 SOLUTION RESPIRATORY (INHALATION) at 08:53

## 2024-08-04 RX ADMIN — ENOXAPARIN SODIUM 30 MG: 100 INJECTION SUBCUTANEOUS at 08:16

## 2024-08-04 RX ADMIN — FERROUS SULFATE TAB 325 MG (65 MG ELEMENTAL FE) 325 MG: 325 (65 FE) TAB at 17:27

## 2024-08-04 RX ADMIN — Medication 10 ML: at 21:44

## 2024-08-04 RX ADMIN — ARFORMOTEROL TARTRATE 15 MCG: 15 SOLUTION RESPIRATORY (INHALATION) at 18:41

## 2024-08-04 NOTE — PLAN OF CARE
Goal Outcome Evaluation:      No acute events overnight, VSS. Patient had family at bedside.

## 2024-08-04 NOTE — PROGRESS NOTES
[facility]   Progress Note    Patient Name: Danielle Granda  : 1940  MRN: [id number]  Primary Care Physician: Provider, No Known  Date of admission: 2024    Subjective   Subjective     Chief Complaint:  Follow-up on fracture of femur    History of Present Illness  No complaints.  Son is in the room  Patient wishes to go home tomorrow  She ambulated with minimal assistance      Review of Systems   Constitutional:  Positive for activity change.       Objective   Objective     Vitals:  Temp:  [97.4 °F (36.3 °C)-99.8 °F (37.7 °C)] 99.8 °F (37.7 °C)  Heart Rate:  [73-83] 76  Resp:  [16-18] 16  BP: (125-153)/(73-95) 125/95  Flow (L/min):  [2] 2    Physical Exam  Constitutional:       Appearance: Normal appearance.   Cardiovascular:      Rate and Rhythm: Normal rate.      Pulses: Normal pulses.   Pulmonary:      Effort: Pulmonary effort is normal.   Neurological:      Mental Status: She is alert.         Result Review    Result Review:  I have personally reviewed the results from the time of this admission to 24 8:57 PM EDT and agree with these findings:  []  Laboratory  []  Microbiology  []  Radiology  []  EKG/Telemetry   []  Cardiology/Vascular   []  Pathology  []  Old records  []  Other:    Assessment & Plan   Assessment / Plan       Active Hospital Problems:  Active Hospital Problems    Diagnosis     Postoperative anemia due to acute blood loss     Essential hypertension     Closed intertrochanteric fracture of hip, left, initial encounter     Intertrochanteric fracture of left femur     Acute urinary retention     COPD (chronic obstructive pulmonary disease)     Fall        Plan:  Continue current management  Possible discharge to home in the morning                    Electronically signed by Ernie Cuevas MD, 24, 8:57 PM EDT.

## 2024-08-04 NOTE — THERAPY TREATMENT NOTE
Acute Care - Physical Therapy Progress Note   [place]     Patient Name: Danielle Granda  : 1940  MRN: [id number]  Today's Date: 2024      Visit Dx:     ICD-10-CM ICD-9-CM   1. Closed intertrochanteric fracture of hip, left, initial encounter  S72.142A 820.21   2. Difficulty in walking  R26.2 719.7     Patient Active Problem List   Diagnosis    Fracture    Closed intertrochanteric fracture of hip, left, initial encounter    Intertrochanteric fracture of left femur    Acute urinary retention    COPD (chronic obstructive pulmonary disease)    Fall    Postoperative anemia due to acute blood loss    Essential hypertension     Past Medical History:   Diagnosis Date    Anxiety     Hyperlipidemia     Hypertension      Past Surgical History:   Procedure Laterality Date    HIP INTERTROCHANTERIC NAILING Left 2024    Procedure: HIP INTERTROCHANTERIC NAILING;  Surgeon: Dutch Farmer MD;  Location: [facility] OR;  Service: Orthopedics;  Laterality: Left;    HYSTERECTOMY       PT Assessment (Last 12 Hours)       PT Evaluation and Treatment       Row Name 24 1500          Physical Therapy Time and Intention    Subjective Information no complaints  -CS     Document Type therapy note (daily note)  -CS     Mode of Treatment individual therapy;physical therapy  -CS     Patient Effort excellent  -CS     Symptoms Noted During/After Treatment fatigue  -CS       Row Name 24 1500          Pain    Pretreatment Pain Rating 0/10 - no pain  -CS     Posttreatment Pain Rating 0/10 - no pain  -CS       Row Name 24 1500          Bed Mobility    Scooting/Bridging [place] (Bed Mobility) verbal cues;contact guard;1 person assist  -CS     Supine-Sit [place] (Bed Mobility) verbal cues;minimum assist (75% patient effort);1 person assist  -CS     Bed Mobility, Safety Issues decreased use of arms for pushing/pulling;decreased use of legs for bridging/pushing  -CS     Assistive Device (Bed Mobility) bed  rails;head of bed elevated  -CS       Row Name 08/04/24 1500          Sit-Stand Transfer    Sit-Stand [place] (Transfers) verbal cues;contact guard;1 person assist  -CS     Assistive Device (Sit-Stand Transfers) walker, front-wheeled  -CS       Row Name 08/04/24 1500          Stand-Sit Transfer    Stand-Sit [place] (Transfers) verbal cues;contact guard;1 person assist  -CS     Assistive Device (Stand-Sit Transfers) walker, front-wheeled  -CS       Row Name 08/04/24 1500          Gait/Stairs (Locomotion)    [place] Level (Gait) verbal cues;contact guard;minimum assist (75% patient effort);1 person assist  -CS     Assistive Device (Gait) walker, front-wheeled  -CS     Distance in Feet (Gait) 85  -CS     Pattern (Gait) 4-point;step-to  -CS     Deviations/Abnormal Patterns (Gait) base of support, narrow;festinating/shuffling;gait speed decreased;prabhu decreased;stride length decreased  -CS     Bilateral Gait Deviations forward flexed posture  -CS     Left Sided Gait Deviations weight shift ability decreased  -CS       Row Name 08/04/24 1500          Hip (Therapeutic Exercise)    Hip (Therapeutic Exercise) AROM (active range of motion);AAROM (active assistive range of motion);isometric exercises  -     Hip AROM (Therapeutic Exercise) right;aBduction;aDduction;supine;20 repititions  heel slides  -CS     Hip AAROM (Therapeutic Exercise) left;aBduction;aDduction;supine;10 repetitions;2 sets  heel slides  -CS     Hip Isometrics (Therapeutic Exercise) bilateral;gluteal sets;supine;10 repetitions;2 sets;3 second hold  -CS       Row Name 08/04/24 1500          Knee (Therapeutic Exercise)    Knee (Therapeutic Exercise) AROM (active range of motion);AAROM (active assistive range of motion);isometric exercises  -CS     Knee AROM (Therapeutic Exercise) bilateral;LAQ (long arc quad);SAQ (short arc quad);supine;20 repititions;sitting  -CS     Knee AAROM (Therapeutic Exercise) bilateral;supine;10 repetitions;2 sets   SLR's  -CS     Knee Isometrics (Therapeutic Exercise) bilateral;quad sets;supine;10 repetitions;2 sets;3 second hold  -CS       Row Name 08/04/24 1500          Ankle (Therapeutic Exercise)    Ankle (Therapeutic Exercise) AROM (active range of motion)  -     Ankle AROM (Therapeutic Exercise) bilateral;dorsiflexion;plantarflexion;supine;20 repititions  -CS       Row Name             Wound 07/30/24 1655 Left anterior hip Incision    Wound - Properties Group Placement Date: 07/30/24  -SP Placement Time: 1655  -SP Present on Original Admission: N  -SP Side: Left  -SP Orientation: anterior  -SP Location: hip  -SP Primary Wound Type: Incision  -SP    Retired Wound - Properties Group Placement Date: 07/30/24  -SP Placement Time: 1655 -SP Present on Original Admission: N  -SP Side: Left  -SP Orientation: anterior  -SP Location: hip  -SP Primary Wound Type: Incision  -SP    Retired Wound - Properties Group Date first assessed: 07/30/24  -SP Time first assessed: 1655 -SP Present on Original Admission: N  -SP Side: Left  -SP Location: hip  -SP Primary Wound Type: Incision  -SP      Row Name 08/04/24 1500          Positioning and Restraints    Pre-Treatment Position in bed  -CS     Post Treatment Position chair  -CS     In Chair reclined;call light within reach;encouraged to call for assist;exit alarm on;with family/caregiver;legs elevated;heels elevated  -       Row Name 08/04/24 1500          Progress Summary (PT)    Progress Toward Functional Goals (PT) progress toward functional goals is good  -CS               User Key  (r) = Recorded By, (t) = Taken By, (c) = Cosigned By      Initials Name Provider Type    Kevin Walton PTA Physical Therapist Assistant    Suzette Oliver RN Registered Nurse                      PT Recommendation and Plan     Progress Summary (PT)  Progress Toward Functional Goals (PT): progress toward functional goals is good   Outcome Measures       Row Name 08/04/24 1500 08/03/24  1601 08/02/24 1600       How much help from another person do you currently need...    Turning from your back to your side while in flat bed without using bedrails? 3  -CS 3  -TS 3  -CS    Moving from lying on back to sitting on the side of a flat bed without bedrails? 3  -CS 3  -TS 3  -CS    Moving to and from a bed to a chair (including a wheelchair)? 3  -CS 3  -TS 3  -CS    Standing up from a chair using your arms (e.g., wheelchair, bedside chair)? 3  -CS 3  -TS 3  -CS    Climbing 3-5 steps with a railing? 3  -CS 2  -TS 2  -CS    To walk in hospital room? 3  -CS 3  -TS 3  -CS    AM-PAC 6 Clicks Score (PT) 18  -CS 17  -TS 17  -CS    Highest Level of Mobility Goal 6 --> Walk 10 steps or more  -CS 5 --> Static standing  -TS 5 --> Static standing  -CS       Functional Assessment    Outcome Measure Options AM-PAC 6 Clicks Basic Mobility (PT)  -CS -- AM-PAC 6 Clicks Basic Mobility (PT)  -CS              User Key  (r) = Recorded By, (t) = Taken By, (c) = Cosigned By      Initials Name Provider Type    TS Bernard Rodriguez PTA Physical Therapist Assistant     Kevin Michaels PTA Physical Therapist Assistant                     Time Calculation:    PT Charges       Row Name 08/04/24 1546             Time Calculation    Start Time 1151  -CS      PT Received On 08/04/24  -CS         Timed Charges    15263 - PT Therapeutic Exercise Minutes 16  -CS      04609 - Gait Training Minutes  16  -CS      99634 - PT Therapeutic Activity Minutes 8  -CS         Total Minutes    Timed Charges Total Minutes 40  -CS       Total Minutes 40  -CS                User Key  (r) = Recorded By, (t) = Taken By, (c) = Cosigned By      Initials Name Provider Type    CS Kevin Michaels PTA Physical Therapist Assistant                  Therapy Charges for Today       Code Description Service Date Service Provider Modifiers Qty    21163593629 HC PT THER PROC EA 15 MIN 8/4/2024 Kevin Michaels PTA GP 1    15208764769 HC GAIT TRAINING EA 15 MIN  8/4/2024 Kevin Michaels PTA GP 1    43237351446 HC PT THERAPEUTIC ACT EA 15 MIN 8/4/2024 Kevin Michaels PTA GP 1            PT G-Codes  Outcome Measure Options: AM-PAC 6 Clicks Basic Mobility (PT)  AM-PAC 6 Clicks Score (PT): 18  AM-PAC 6 Clicks Score (OT): 10    Kevin Michaels PTA  8/4/2024

## 2024-08-04 NOTE — PLAN OF CARE
Goal Outcome Evaluation:  Plan of Care Reviewed With: patient           Outcome Evaluation: Patient alert and oriented x4. Ambulates with x1 assist and walker. Pills taken whole one at a time. Complaints of pain treated with prn medications. Potential discharge home tomorrow.

## 2024-08-05 ENCOUNTER — TELEPHONE (OUTPATIENT)
Dept: ORTHOPEDIC SURGERY | Facility: CLINIC | Age: 84
End: 2024-08-05
Payer: MEDICARE
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
## 2024-08-05 PROBLEM — R00.1 BRADYCARDIA, SINUS: Status: ACTIVE | Noted: 2024-08-05

## 2024-08-05 PROBLEM — S72.142A INTERTROCHANTERIC FRACTURE OF LEFT FEMUR: Status: RESOLVED | Noted: 2024-07-30 | Resolved: 2024-08-05

## 2024-08-05 PROBLEM — R33.8 ACUTE URINARY RETENTION: Status: RESOLVED | Noted: 2024-07-30 | Resolved: 2024-08-05

## 2024-08-05 LAB
ANION GAP SERPL CALCULATED.3IONS-SCNC: 12.1 MMOL/L (ref 5–15)
BASOPHILS # BLD AUTO: 0.02 10*3/MM3 (ref 0–0.2)
BASOPHILS NFR BLD AUTO: 0.2 % (ref 0–1.5)
BUN SERPL-MCNC: 17 MG/DL (ref 8–23)
BUN/CREAT SERPL: 38.6 (ref 7–25)
CALCIUM SPEC-SCNC: 9.2 MG/DL (ref 8.6–10.5)
CHLORIDE SERPL-SCNC: 99 MMOL/L (ref 98–107)
CO2 SERPL-SCNC: 25.9 MMOL/L (ref 22–29)
CREAT SERPL-MCNC: 0.44 MG/DL (ref 0.57–1)
DEPRECATED RDW RBC AUTO: 50.2 FL (ref 37–54)
EGFRCR SERPLBLD CKD-EPI 2021: 95.5 ML/MIN/1.73
EOSINOPHIL # BLD AUTO: 0.06 10*3/MM3 (ref 0–0.4)
EOSINOPHIL NFR BLD AUTO: 0.7 % (ref 0.3–6.2)
ERYTHROCYTE [DISTWIDTH] IN BLOOD BY AUTOMATED COUNT: 14.5 % (ref 12.3–15.4)
GLUCOSE SERPL-MCNC: 90 MG/DL (ref 65–99)
HCT VFR BLD AUTO: 27.6 % (ref 34–46.6)
HGB BLD-MCNC: 9 G/DL (ref 12–15.9)
IMM GRANULOCYTES # BLD AUTO: 0.07 10*3/MM3 (ref 0–0.05)
IMM GRANULOCYTES NFR BLD AUTO: 0.8 % (ref 0–0.5)
LYMPHOCYTES # BLD AUTO: 0.77 10*3/MM3 (ref 0.7–3.1)
LYMPHOCYTES NFR BLD AUTO: 9.3 % (ref 19.6–45.3)
MAGNESIUM SERPL-MCNC: 1.8 MG/DL (ref 1.6–2.4)
MCH RBC QN AUTO: 31.5 PG (ref 26.6–33)
MCHC RBC AUTO-ENTMCNC: 32.6 G/DL (ref 31.5–35.7)
MCV RBC AUTO: 96.5 FL (ref 79–97)
MONOCYTES # BLD AUTO: 0.54 10*3/MM3 (ref 0.1–0.9)
MONOCYTES NFR BLD AUTO: 6.5 % (ref 5–12)
NEUTROPHILS NFR BLD AUTO: 6.81 10*3/MM3 (ref 1.7–7)
NEUTROPHILS NFR BLD AUTO: 82.5 % (ref 42.7–76)
NRBC BLD AUTO-RTO: 0 /100 WBC (ref 0–0.2)
PHOSPHATE SERPL-MCNC: 2.8 MG/DL (ref 2.5–4.5)
PLATELET # BLD AUTO: 210 10*3/MM3 (ref 140–450)
PMV BLD AUTO: 10.8 FL (ref 6–12)
POTASSIUM SERPL-SCNC: 3.3 MMOL/L (ref 3.5–5.2)
RBC # BLD AUTO: 2.86 10*6/MM3 (ref 3.77–5.28)
SODIUM SERPL-SCNC: 137 MMOL/L (ref 136–145)
WBC NRBC COR # BLD AUTO: 8.27 10*3/MM3 (ref 3.4–10.8)

## 2024-08-05 PROCEDURE — 94664 DEMO&/EVAL PT USE INHALER: CPT

## 2024-08-05 PROCEDURE — 85025 COMPLETE CBC W/AUTO DIFF WBC: CPT | Performed by: ORTHOPAEDIC SURGERY

## 2024-08-05 PROCEDURE — 80048 BASIC METABOLIC PNL TOTAL CA: CPT | Performed by: ORTHOPAEDIC SURGERY

## 2024-08-05 PROCEDURE — 25010000002 ENOXAPARIN PER 10 MG: Performed by: INTERNAL MEDICINE

## 2024-08-05 PROCEDURE — 84100 ASSAY OF PHOSPHORUS: CPT | Performed by: ORTHOPAEDIC SURGERY

## 2024-08-05 PROCEDURE — 93010 ELECTROCARDIOGRAM REPORT: CPT | Performed by: INTERNAL MEDICINE

## 2024-08-05 PROCEDURE — 99222 1ST HOSP IP/OBS MODERATE 55: CPT | Performed by: INTERNAL MEDICINE

## 2024-08-05 PROCEDURE — 83735 ASSAY OF MAGNESIUM: CPT | Performed by: ORTHOPAEDIC SURGERY

## 2024-08-05 PROCEDURE — 94761 N-INVAS EAR/PLS OXIMETRY MLT: CPT

## 2024-08-05 PROCEDURE — 63710000001 ONDANSETRON ODT 4 MG TABLET DISPERSIBLE: Performed by: ORTHOPAEDIC SURGERY

## 2024-08-05 PROCEDURE — 94799 UNLISTED PULMONARY SVC/PX: CPT

## 2024-08-05 PROCEDURE — 93005 ELECTROCARDIOGRAM TRACING: CPT | Performed by: INTERNAL MEDICINE

## 2024-08-05 RX ORDER — NICOTINE 21 MG/24HR
1 PATCH, TRANSDERMAL 24 HOURS TRANSDERMAL
Qty: 30 PATCH | Refills: 0 | Status: SHIPPED | OUTPATIENT
Start: 2024-08-05 | End: 2024-09-04

## 2024-08-05 RX ORDER — ASPIRIN 325 MG
325 TABLET, DELAYED RELEASE (ENTERIC COATED) ORAL DAILY
Qty: 30 TABLET | Refills: 0 | Status: SHIPPED | OUTPATIENT
Start: 2024-08-05 | End: 2024-08-11 | Stop reason: HOSPADM

## 2024-08-05 RX ORDER — POTASSIUM CHLORIDE 750 MG/1
40 CAPSULE, EXTENDED RELEASE ORAL ONCE
Status: COMPLETED | OUTPATIENT
Start: 2024-08-05 | End: 2024-08-05

## 2024-08-05 RX ORDER — ERGOCALCIFEROL 1.25 MG/1
50000 CAPSULE ORAL
Status: DISCONTINUED | OUTPATIENT
Start: 2024-08-05 | End: 2024-08-06 | Stop reason: HOSPADM

## 2024-08-05 RX ORDER — TRAMADOL HYDROCHLORIDE 50 MG/1
50 TABLET ORAL EVERY 6 HOURS PRN
Qty: 90 TABLET | Refills: 0 | Status: SHIPPED | OUTPATIENT
Start: 2024-08-05 | End: 2024-09-04

## 2024-08-05 RX ORDER — SERTRALINE HYDROCHLORIDE 25 MG/1
25 TABLET, FILM COATED ORAL DAILY
Qty: 30 TABLET | Refills: 11 | Status: SHIPPED | OUTPATIENT
Start: 2024-08-05 | End: 2025-08-06

## 2024-08-05 RX ORDER — ALBUTEROL SULFATE 90 UG/1
2 AEROSOL, METERED RESPIRATORY (INHALATION) EVERY 4 HOURS PRN
Qty: 18 G | Refills: 0 | Status: SHIPPED | OUTPATIENT
Start: 2024-08-05 | End: 2024-09-04

## 2024-08-05 RX ORDER — METOPROLOL SUCCINATE 25 MG/1
25 TABLET, EXTENDED RELEASE ORAL
Qty: 30 TABLET | Refills: 0 | Status: SHIPPED | OUTPATIENT
Start: 2024-08-05 | End: 2024-08-06

## 2024-08-05 RX ORDER — AMOXICILLIN 250 MG
2 CAPSULE ORAL EVERY 12 HOURS PRN
Qty: 120 TABLET | Refills: 0 | Status: SHIPPED | OUTPATIENT
Start: 2024-08-05 | End: 2024-09-04

## 2024-08-05 RX ORDER — BUDESONIDE, GLYCOPYRROLATE, AND FORMOTEROL FUMARATE 160; 9; 4.8 UG/1; UG/1; UG/1
2 AEROSOL, METERED RESPIRATORY (INHALATION) 2 TIMES DAILY
Qty: 10.7 G | Refills: 2 | Status: SHIPPED | OUTPATIENT
Start: 2024-08-05 | End: 2024-09-04

## 2024-08-05 RX ORDER — AMLODIPINE BESYLATE 5 MG/1
5 TABLET ORAL
Qty: 30 TABLET | Refills: 0 | Status: SHIPPED | OUTPATIENT
Start: 2024-08-05 | End: 2024-09-04

## 2024-08-05 RX ORDER — PRAVASTATIN SODIUM 40 MG
40 TABLET ORAL DAILY
Qty: 30 TABLET | Refills: 11 | Status: SHIPPED | OUTPATIENT
Start: 2024-08-05 | End: 2025-08-06

## 2024-08-05 RX ORDER — METOPROLOL SUCCINATE 25 MG/1
25 TABLET, EXTENDED RELEASE ORAL
Status: DISCONTINUED | OUTPATIENT
Start: 2024-08-05 | End: 2024-08-06

## 2024-08-05 RX ADMIN — ONDANSETRON 4 MG: 4 TABLET, ORALLY DISINTEGRATING ORAL at 10:48

## 2024-08-05 RX ADMIN — SENNOSIDES AND DOCUSATE SODIUM 2 TABLET: 50; 8.6 TABLET ORAL at 21:01

## 2024-08-05 RX ADMIN — SENNOSIDES AND DOCUSATE SODIUM 2 TABLET: 50; 8.6 TABLET ORAL at 09:02

## 2024-08-05 RX ADMIN — METOPROLOL SUCCINATE 25 MG: 25 TABLET, EXTENDED RELEASE ORAL at 09:02

## 2024-08-05 RX ADMIN — Medication 10 ML: at 09:03

## 2024-08-05 RX ADMIN — BUDESONIDE 0.5 MG: 0.5 INHALANT ORAL at 09:31

## 2024-08-05 RX ADMIN — FERROUS SULFATE TAB 325 MG (65 MG ELEMENTAL FE) 325 MG: 325 (65 FE) TAB at 18:47

## 2024-08-05 RX ADMIN — FERROUS SULFATE TAB 325 MG (65 MG ELEMENTAL FE) 325 MG: 325 (65 FE) TAB at 09:02

## 2024-08-05 RX ADMIN — POTASSIUM CHLORIDE 40 MEQ: 750 CAPSULE, EXTENDED RELEASE ORAL at 09:01

## 2024-08-05 RX ADMIN — ARFORMOTEROL TARTRATE 15 MCG: 15 SOLUTION RESPIRATORY (INHALATION) at 19:02

## 2024-08-05 RX ADMIN — CYANOCOBALAMIN TAB 500 MCG 1000 MCG: 500 TAB at 09:02

## 2024-08-05 RX ADMIN — NICOTINE 1 PATCH: 21 PATCH, EXTENDED RELEASE TRANSDERMAL at 09:03

## 2024-08-05 RX ADMIN — SERTRALINE HYDROCHLORIDE 25 MG: 25 TABLET ORAL at 09:02

## 2024-08-05 RX ADMIN — BUDESONIDE 0.5 MG: 0.5 INHALANT ORAL at 19:02

## 2024-08-05 RX ADMIN — PRAVASTATIN SODIUM 40 MG: 20 TABLET ORAL at 21:01

## 2024-08-05 RX ADMIN — TAMSULOSIN HYDROCHLORIDE 0.4 MG: 0.4 CAPSULE ORAL at 21:01

## 2024-08-05 RX ADMIN — ASPIRIN 81 MG: 81 TABLET, COATED ORAL at 09:01

## 2024-08-05 RX ADMIN — ERGOCALCIFEROL 50000 UNITS: 1.25 CAPSULE ORAL at 22:11

## 2024-08-05 RX ADMIN — AVOBENZONE, HOMOSALATE, OCTISALATE, OCTOCRYLENE, AND OXYBENZONE 1 PACKET: 29.4; 147; 49; 25.4; 58.8 LOTION TOPICAL at 09:01

## 2024-08-05 RX ADMIN — AMLODIPINE BESYLATE 5 MG: 5 TABLET ORAL at 09:02

## 2024-08-05 RX ADMIN — Medication 10 ML: at 21:01

## 2024-08-05 RX ADMIN — ARFORMOTEROL TARTRATE 15 MCG: 15 SOLUTION RESPIRATORY (INHALATION) at 09:31

## 2024-08-05 RX ADMIN — ENOXAPARIN SODIUM 30 MG: 100 INJECTION SUBCUTANEOUS at 09:01

## 2024-08-05 NOTE — TELEPHONE ENCOUNTER
Caller: YAO    Relationship to patient: [facility]    Best call back number: [phone]*    Patient is needing: PT IS NEEDING HAVE A 2 WEEK F/U SCHEDULED WITH DR LIU, HE PERFORMED SX ON THIS PT'S LEFT HIP ON 7/30/24.. PLEASE ADVISE..

## 2024-08-05 NOTE — PROGRESS NOTES
[facility]   Progress Note    Patient Name: Danielle Granda  : 1940  MRN: [id number]  Primary Care Physician: Provider, No Known  Date of admission: 2024    Subjective   Subjective     Chief Complaint:  Follow-up on fracture of femur    History of Present Illness  Doing well.  No new complaints.  Patient was to be discharged today but an episode of bradycardia was noted on the monitor, it was asymptomatic      Review of Systems   Constitutional:  Positive for activity change.       Objective   Objective     Vitals:  Temp:  [97.7 °F (36.5 °C)-98.6 °F (37 °C)] 98.1 °F (36.7 °C)  Heart Rate:  [70-82] 76  Resp:  [12-20] 14  BP: (122-156)/(67-90) 148/90  Flow (L/min):  [2] 2    Physical Exam  Cardiovascular:      Rate and Rhythm: Normal rate.      Pulses: Normal pulses.   Pulmonary:      Effort: Pulmonary effort is normal.   Neurological:      Mental Status: She is alert.         Result Review    Result Review:  I have personally reviewed the results from the time of this admission to 24 11:52 PM EDT and agree with these findings:  []  Laboratory  []  Microbiology  []  Radiology  []  EKG/Telemetry   []  Cardiology/Vascular   []  Pathology  []  Old records  []  Other:    Assessment & Plan   Assessment / Plan       Active Hospital Problems:  Active Hospital Problems    Diagnosis     Postoperative anemia due to acute blood loss     Essential hypertension     Closed intertrochanteric fracture of hip, left, initial encounter     Intertrochanteric fracture of left femur     Acute urinary retention     COPD (chronic obstructive pulmonary disease)     Fall        Plan:  Hold discharge in view of change in cardiac rhythm.  Discharged home in the morning if cardiovascular status is stable                    Electronically signed by Ernie Cuevas MD, 24, 11:52 PM EDT.

## 2024-08-05 NOTE — SIGNIFICANT NOTE
08/05/24 1348   OTHER   Discipline occupational therapist   Rehab Time/Intention   Session Not Performed   (in process of hospital discharge)

## 2024-08-05 NOTE — PLAN OF CARE
Goal Outcome Evaluation:  Plan of Care Reviewed With: patient        Progress: improving  Outcome Evaluation: Pt. remains A & O X 4. Son at bedside. Dressing changed to left hip. Small blister noted under old dressing to left hip. Mepilex placed over blister, image uploaded. Pt. voidng well this shift. No new concerns stated.

## 2024-08-05 NOTE — SIGNIFICANT NOTE
08/05/24 0900   Physical Therapy Time and Intention   Session Not Performed other (see comments)  (Pt in process of discharge)

## 2024-08-05 NOTE — PLAN OF CARE
Goal Outcome Evaluation:           Progress: no change  Outcome Evaluation: Pt is alert and oriented; Pt is given nausea medication this shift. Intervention effective per patient. Pt is voiding well. Safety checks done hourly. Call light and pt possesses remain within easy reach. No other issues to report.

## 2024-08-06 ENCOUNTER — HOSPITAL ENCOUNTER (EMERGENCY)
Facility: HOSPITAL | Age: 84
Discharge: HOME | End: 2024-08-06
Attending: EMERGENCY MEDICINE | Admitting: EMERGENCY MEDICINE
Payer: MEDICARE

## 2024-08-06 ENCOUNTER — APPOINTMENT (OUTPATIENT)
Dept: CARDIOLOGY | Facility: HOSPITAL | Age: 84
DRG: 481 | End: 2024-08-06
Payer: MEDICARE

## 2024-08-06 ENCOUNTER — READMISSION MANAGEMENT (OUTPATIENT)
Dept: CALL CENTER | Facility: HOSPITAL | Age: 84
End: 2024-08-06
Payer: MEDICARE

## 2024-08-06 VITALS
TEMPERATURE: 98.2 F | WEIGHT: 113.54 LBS | SYSTOLIC BLOOD PRESSURE: 133 MMHG | HEART RATE: 80 BPM | OXYGEN SATURATION: 95 % | BODY MASS INDEX: 21.44 KG/M2 | DIASTOLIC BLOOD PRESSURE: 63 MMHG | HEIGHT: 61 IN | RESPIRATION RATE: 16 BRPM

## 2024-08-06 VITALS
WEIGHT: 113.54 LBS | HEART RATE: 87 BPM | DIASTOLIC BLOOD PRESSURE: 65 MMHG | HEIGHT: 61 IN | OXYGEN SATURATION: 96 % | TEMPERATURE: 98.8 F | RESPIRATION RATE: 18 BRPM | SYSTOLIC BLOOD PRESSURE: 125 MMHG | BODY MASS INDEX: 21.44 KG/M2

## 2024-08-06 DIAGNOSIS — Z71.1 PHYSICALLY WELL BUT WORRIED: Primary | ICD-10-CM

## 2024-08-06 LAB
ANION GAP SERPL CALCULATED.3IONS-SCNC: 9.8 MMOL/L (ref 5–15)
AORTIC DIMENSIONLESS INDEX: 0.71 (DI)
ASCENDING AORTA: 3.3 CM
BH CV ECHO MEAS - ACS: 1.83 CM
BH CV ECHO MEAS - AO MAX PG: 13.8 MMHG
BH CV ECHO MEAS - AO MEAN PG: 6.4 MMHG
BH CV ECHO MEAS - AO ROOT DIAM: 2.8 CM
BH CV ECHO MEAS - AO V2 MAX: 185.4 CM/SEC
BH CV ECHO MEAS - AO V2 VTI: 37.3 CM
BH CV ECHO MEAS - AVA(I,D): 2.06 CM2
BH CV ECHO MEAS - EDV(CUBED): 53.5 ML
BH CV ECHO MEAS - EDV(MOD-SP2): 25.6 ML
BH CV ECHO MEAS - EDV(MOD-SP4): 62.6 ML
BH CV ECHO MEAS - EF(MOD-BP): 64.1 %
BH CV ECHO MEAS - EF(MOD-SP2): 50.4 %
BH CV ECHO MEAS - EF(MOD-SP4): 69.5 %
BH CV ECHO MEAS - ESV(CUBED): 11.4 ML
BH CV ECHO MEAS - ESV(MOD-SP2): 12.7 ML
BH CV ECHO MEAS - ESV(MOD-SP4): 19.1 ML
BH CV ECHO MEAS - FS: 40.3 %
BH CV ECHO MEAS - IVS/LVPW: 1.01 CM
BH CV ECHO MEAS - IVSD: 0.96 CM
BH CV ECHO MEAS - LA DIMENSION: 3.4 CM
BH CV ECHO MEAS - LAT PEAK E' VEL: 5.5 CM/SEC
BH CV ECHO MEAS - LV MASS(C)D: 109.2 GRAMS
BH CV ECHO MEAS - LV MAX PG: 6.7 MMHG
BH CV ECHO MEAS - LV MEAN PG: 3.1 MMHG
BH CV ECHO MEAS - LV V1 MAX: 129.9 CM/SEC
BH CV ECHO MEAS - LV V1 VTI: 26.3 CM
BH CV ECHO MEAS - LVIDD: 3.8 CM
BH CV ECHO MEAS - LVIDS: 2.25 CM
BH CV ECHO MEAS - LVOT AREA: 2.9 CM2
BH CV ECHO MEAS - LVOT DIAM: 1.93 CM
BH CV ECHO MEAS - LVPWD: 0.96 CM
BH CV ECHO MEAS - MED PEAK E' VEL: 5.3 CM/SEC
BH CV ECHO MEAS - MV A MAX VEL: 136 CM/SEC
BH CV ECHO MEAS - MV DEC SLOPE: 471.1 CM/SEC2
BH CV ECHO MEAS - MV DEC TIME: 0.19 SEC
BH CV ECHO MEAS - MV E MAX VEL: 77.1 CM/SEC
BH CV ECHO MEAS - MV E/A: 0.57
BH CV ECHO MEAS - MV MEAN PG: 2.6 MMHG
BH CV ECHO MEAS - MV P1/2T: 48.8 MSEC
BH CV ECHO MEAS - MV V2 VTI: 29.4 CM
BH CV ECHO MEAS - MVA(P1/2T): 4.5 CM2
BH CV ECHO MEAS - MVA(VTI): 2.6 CM2
BH CV ECHO MEAS - PA V2 MAX: 135.7 CM/SEC
BH CV ECHO MEAS - PULM A REVS DUR: 0.11 SEC
BH CV ECHO MEAS - PULM A REVS VEL: 44 CM/SEC
BH CV ECHO MEAS - PULM DIAS VEL: 54.8 CM/SEC
BH CV ECHO MEAS - PULM S/D: 1.49
BH CV ECHO MEAS - PULM SYS VEL: 81.7 CM/SEC
BH CV ECHO MEAS - QP/QS: 0.69
BH CV ECHO MEAS - RAP SYSTOLE: 8 MMHG
BH CV ECHO MEAS - RV MAX PG: 2.8 MMHG
BH CV ECHO MEAS - RV V1 MAX: 84.4 CM/SEC
BH CV ECHO MEAS - RV V1 VTI: 17.6 CM
BH CV ECHO MEAS - RVDD: 3.6 CM
BH CV ECHO MEAS - RVOT DIAM: 1.95 CM
BH CV ECHO MEAS - RVSP: 34.8 MMHG
BH CV ECHO MEAS - SV(LVOT): 76.7 ML
BH CV ECHO MEAS - SV(MOD-SP2): 12.9 ML
BH CV ECHO MEAS - SV(MOD-SP4): 43.5 ML
BH CV ECHO MEAS - SV(RVOT): 52.6 ML
BH CV ECHO MEAS - TAPSE (>1.6): 2.11 CM
BH CV ECHO MEAS - TR MAX PG: 26.8 MMHG
BH CV ECHO MEAS - TR MAX VEL: 259 CM/SEC
BH CV ECHO MEASUREMENTS AVERAGE E/E' RATIO: 14.28
BH CV XLRA - TDI S': 20.3 CM/SEC
BUN SERPL-MCNC: 21 MG/DL (ref 8–23)
BUN/CREAT SERPL: 38.9 (ref 7–25)
CALCIUM SPEC-SCNC: 9 MG/DL (ref 8.6–10.5)
CHLORIDE SERPL-SCNC: 100 MMOL/L (ref 98–107)
CO2 SERPL-SCNC: 29.2 MMOL/L (ref 22–29)
CREAT SERPL-MCNC: 0.54 MG/DL (ref 0.57–1)
EGFRCR SERPLBLD CKD-EPI 2021: 90.9 ML/MIN/1.73
GLUCOSE SERPL-MCNC: 92 MG/DL (ref 65–99)
IVRT: 95 MS
LEFT ATRIUM VOLUME INDEX: 24.2 ML/M2
MAGNESIUM SERPL-MCNC: 1.6 MG/DL (ref 1.6–2.4)
POTASSIUM SERPL-SCNC: 3.4 MMOL/L (ref 3.5–5.2)
QT INTERVAL: 399 MS
QTC INTERVAL: 466 MS
SODIUM SERPL-SCNC: 139 MMOL/L (ref 136–145)

## 2024-08-06 PROCEDURE — 25010000002 ENOXAPARIN PER 10 MG: Performed by: INTERNAL MEDICINE

## 2024-08-06 PROCEDURE — 94799 UNLISTED PULMONARY SVC/PX: CPT

## 2024-08-06 PROCEDURE — 93306 TTE W/DOPPLER COMPLETE: CPT

## 2024-08-06 PROCEDURE — 83735 ASSAY OF MAGNESIUM: CPT | Performed by: INTERNAL MEDICINE

## 2024-08-06 PROCEDURE — 94761 N-INVAS EAR/PLS OXIMETRY MLT: CPT

## 2024-08-06 PROCEDURE — 93306 TTE W/DOPPLER COMPLETE: CPT | Performed by: INTERNAL MEDICINE

## 2024-08-06 PROCEDURE — 99282 EMERGENCY DEPT VISIT SF MDM: CPT

## 2024-08-06 PROCEDURE — 97110 THERAPEUTIC EXERCISES: CPT

## 2024-08-06 PROCEDURE — 99232 SBSQ HOSP IP/OBS MODERATE 35: CPT | Performed by: INTERNAL MEDICINE

## 2024-08-06 PROCEDURE — 94664 DEMO&/EVAL PT USE INHALER: CPT

## 2024-08-06 PROCEDURE — 80048 BASIC METABOLIC PNL TOTAL CA: CPT | Performed by: INTERNAL MEDICINE

## 2024-08-06 RX ORDER — ENOXAPARIN SODIUM 100 MG/ML
40 INJECTION SUBCUTANEOUS DAILY
Status: DISCONTINUED | OUTPATIENT
Start: 2024-08-07 | End: 2024-08-06 | Stop reason: HOSPADM

## 2024-08-06 RX ORDER — METOPROLOL SUCCINATE 25 MG/1
12.5 TABLET, EXTENDED RELEASE ORAL
Status: DISCONTINUED | OUTPATIENT
Start: 2024-08-07 | End: 2024-08-06 | Stop reason: HOSPADM

## 2024-08-06 RX ORDER — METOPROLOL SUCCINATE 25 MG/1
12.5 TABLET, EXTENDED RELEASE ORAL
Qty: 15 TABLET | Refills: 0 | Status: SHIPPED | OUTPATIENT
Start: 2024-08-06 | End: 2024-09-05

## 2024-08-06 RX ADMIN — NICOTINE 1 PATCH: 21 PATCH, EXTENDED RELEASE TRANSDERMAL at 08:53

## 2024-08-06 RX ADMIN — BUDESONIDE 0.5 MG: 0.5 INHALANT ORAL at 09:23

## 2024-08-06 RX ADMIN — Medication 10 ML: at 08:54

## 2024-08-06 RX ADMIN — ARFORMOTEROL TARTRATE 15 MCG: 15 SOLUTION RESPIRATORY (INHALATION) at 09:23

## 2024-08-06 RX ADMIN — CYANOCOBALAMIN TAB 500 MCG 1000 MCG: 500 TAB at 09:40

## 2024-08-06 RX ADMIN — ENOXAPARIN SODIUM 30 MG: 100 INJECTION SUBCUTANEOUS at 08:53

## 2024-08-06 RX ADMIN — AMLODIPINE BESYLATE 5 MG: 5 TABLET ORAL at 09:40

## 2024-08-06 RX ADMIN — FERROUS SULFATE TAB 325 MG (65 MG ELEMENTAL FE) 325 MG: 325 (65 FE) TAB at 09:40

## 2024-08-06 RX ADMIN — SERTRALINE HYDROCHLORIDE 25 MG: 25 TABLET ORAL at 09:40

## 2024-08-06 RX ADMIN — ASPIRIN 81 MG: 81 TABLET, COATED ORAL at 09:40

## 2024-08-06 RX ADMIN — SENNOSIDES AND DOCUSATE SODIUM 2 TABLET: 50; 8.6 TABLET ORAL at 09:40

## 2024-08-06 NOTE — CONSULTS
[facility]   Cardiology Consult Note    Patient Name: Danielle Granda  : 1940  MRN: [id number]  Primary Care Physician:  Provider, No Known  Referring Physician: No ref. provider found    Date of admission: 2024    Subjective   Subjective     Reason for Consultation : Bradycardia and Sinus Pauses on Tele    Chief Complaint : Left hip discomfort     HPI:  Danielle Granda is a 84 y.o. female with past medical history significant of essential hypertension, mixed hyperlipidemia.  She is an active smoker for over 50 years.  The patient have recent fall fall with left clavicle fracture and soft tissue hematoma.  She also have a left hip trochanteric fracture with recent repair.  It is unclear about the fall.  Unable to determine if the patient has syncope.  Her EKG showed normal sinus rhythm with repolarization abnormalities and occasional PVCs.  Noted on telemetry with episodes of bradycardia down to 35 bpm and some sinus pauses, longest about 3 seconds.  She denies palpitations, dizziness or angina.  Her physical activities are limited which she does not report dyspnea.  Recent findings of the CT showed a pulmonary nodule that is worrisome for possible primary adenocarcinoma of the lung.    Review of Systems   All systems were reviewed and negative except for: Left hip pain    Personal History     Past Medical History:   Diagnosis Date   • Anxiety    • Hyperlipidemia    • Hypertension               Family History: family history is not on file. Otherwise pertinent FHx was reviewed and not pertinent to current issue.    Social History:  reports that she has been smoking cigarettes. She has never used smokeless tobacco. She reports that she does not drink alcohol and does not use drugs.    Home Medications:  Budeson-Glycopyrrol-Formoterol, Cyanocobalamin, albuterol sulfate HFA, amLODIPine, aspirin, budesonide, cholecalciferol, dilTIAZem, levalbuterol, metoprolol succinate XL, nicotine, pravastatin,  "sennosides-docusate, sertraline, and traMADol    Allergies:  No Known Allergies    Objective    Objective     Vitals:   Temp:  [97.7 °F (36.5 °C)-98.6 °F (37 °C)] 98.1 °F (36.7 °C)  Heart Rate:  [52-88] 88  Resp:  [14-20] 16  BP: (108-148)/(61-90) 114/72  Flow (L/min):  [1-2] 1      Physical Exam:  Alert, oriented  Neck. No JVD, no bruits.  Heart. Regular, no gallops, no rubs.  Lungs; diminished breath sounds. Chest is tender on the left side.  Abdomen: soft, bs+  LE no edema/ tender left hip.  Neurologic. No apparent motor deficits.      Result Review    Result Review:  I have personally reviewed the results from the time of this admission to 8/5/2024 22:12 EDT and agree with these findings:  [x]  Laboratory  []  Microbiology  [x]  Radiology  [x]  EKG/Telemetry   [x]  Cardiology/Vascular   []  Pathology  [x]  Old records  []  Other:  Most notable findings include:     CMP          8/3/2024    04:15 8/4/2024    04:06 8/5/2024    04:18   CMP   Glucose 76  68  90    BUN 15  17  17    Creatinine 0.48  0.48  0.44    EGFR 93.5  93.5  95.5    Sodium 137  137  137    Potassium 3.6  3.4  3.3    Chloride 101  102  99    Calcium 9.2  8.8  9.2    BUN/Creatinine Ratio 31.3  35.4  38.6    Anion Gap 9.6  8.2  12.1       CBC          8/3/2024    04:15 8/4/2024    04:06 8/5/2024    04:18   CBC   WBC 7.19  6.13  8.27    RBC 2.75  2.66  2.86    Hemoglobin 8.6  8.3  9.0    Hematocrit 26.5  25.4  27.6    MCV 96.4  95.5  96.5    MCH 31.3  31.2  31.5    MCHC 32.5  32.7  32.6    RDW 15.5  14.8  14.5    Platelets 173  193  210       No results found for: \"TROPONINT\"      Assessment & Plan   Assessment / Plan     Brief Patient Summary:  Danielle Granda is a 84 y.o. female who had recent fall and left hip fracture post op after ortho surgical repair. She also had a left clavicular fracture with chest wall hematoma. The patient has episodes of bradycardia and sinus pauses but EKG showed normal sinus rhythm.     [facility] " Problems:  Active Hospital Problems    Diagnosis    • Bradycardia, sinus    • Postoperative anemia due to acute blood loss    • Essential hypertension    • Closed intertrochanteric fracture of hip, left, initial encounter    • COPD (chronic obstructive pulmonary disease)        It is unclear if the patient had syncope at the time of the hip and clavicular fracture. She had significant bradycardia and sinus pauses on Tele but was on beta-blockers. I would hold the beta-blockers and watch cardiac rhythm on Tele. She would benefit of a cardiac monitor for about 14 days upon discharge to rule out Sick Sinus Syndrome and possible sinus arrest. I would review a 2 DECHO to assess cardiac function when completed. The patient would need follow up of the pulmonary nodule as outpatient.       I spent over 45 minutes of time during this evaluation including 50 % of the time in coordination of care.     Electronically signed by Orville Serra MD, 08/05/24, 10:00 PM EDT.

## 2024-08-06 NOTE — PROGRESS NOTES
[facility]     Progress Note    Patient Name: Danielle Granda  : 1940  MRN: [id number]  Primary Care Physician:  Santo Carter MD  Date of admission: 2024      Subjective   Brief summary.  Patient admitted post hip fracture      HPI:  Patient was seen earlier in the morning, plan was to discharge and discharge orders were done as patient was feeling better.  But later on she developed bradycardia.  No issues of dizziness or lightheadedness but patient had syncopal episodes prior to this admission.      Review of Systems     No chest pain no shortness of breath, no dizziness, episodes of syncope in the recent past.  Continues to have some hip pain      Objective     Vitals:   Temp:  [98.1 °F (36.7 °C)-98.4 °F (36.9 °C)] 98.4 °F (36.9 °C)  Heart Rate:  [73-88] 75  Resp:  [16] 16  BP: (114-148)/(71-91) 130/78  Flow (L/min):  [1] 1    Physical Exam :     Elderly female not in any acute distress.  Heart regular, lungs clear, abdomen soft.  Extremities no edema, surgical wound healing nicely      Result Review:  I have personally reviewed the results from the time of this admission to 2024 15:28 EDT and agree with these findings:  []  Laboratory  []  Microbiology  []  Radiology  []  EKG/Telemetry   []  Cardiology/Vascular   []  Pathology  []  Old records  []  Other:           Assessment / Plan       Active Hospital Problems:  Active Hospital Problems    Diagnosis    • Bradycardia, sinus    • Postoperative anemia due to acute blood loss    • Essential hypertension    • Closed intertrochanteric fracture of hip, left, initial encounter    • COPD (chronic obstructive pulmonary disease)        Plan:   Patient was stable enough for discharge but developed bradycardia, discharge canceled.  Meds to be continued, cardiologist on-call Dr. Serra was notified and consulted.  He will see patient.  Toprol discontinued.  Continue rest of the meds       VTE Prophylaxis:  Pharmacologic & mechanical VTE prophylaxis  orders are present.        CODE STATUS:   Level Of Support Discussed With: Patient  Code Status (Patient has no pulse and is not breathing): CPR (Attempt to Resuscitate)  Medical Interventions (Patient has pulse or is breathing): Full Support            Electronically signed by Santo Carter MD, 08/05/24, 5:28 PM EDT.

## 2024-08-06 NOTE — PLAN OF CARE
Goal Outcome Evaluation:  Plan of Care Reviewed With: patient        Progress: no change  Outcome Evaluation: Pt is alert and oriented. Pt has had no complaints of pain. Pt is discharging home with family. Son will be transportation. Safety checks done hourly. Call light and personal possessions within reach entire shift. Pt voided with no issues. Pt was not able to ambulate very much today. Pt was seen and evaluated by wound care this afternoon. No other issues to note at this time.

## 2024-08-06 NOTE — PROGRESS NOTES
[facility]     Cardiology Progress Note    Patient Name: Danielle Granda  : 1940  MRN: [id number]  Primary Care Physician:  Santo Carter MD  Date of admission: 2024    Subjective   Subjective     Chief Complaint: No complaints today.    Interval HPI:    Patient is cardiac wise stable. Remains in normal sinus rhythm with occasional PVCs on Tele monitor.    Review of Systems   All systems were reviewed and negative except for: hip discomfort.    Objective   Objective     Vitals:   Temp:  [98.1 °F (36.7 °C)-98.4 °F (36.9 °C)] 98.4 °F (36.9 °C)  Heart Rate:  [73-88] 75  Resp:  [16] 16  BP: (114-148)/(71-91) 130/78  Flow (L/min):  [1] 1  Physical Exam      Alert, oriented  Neck. No JVD, no bruits.  Heart. Regular, no gallops, no rubs.  Lungs; diminished breath sounds. Chest is tender on the left side.  Abdomen: soft, bs+  LE no edema/ tender left hip.  Neurologic. No apparent motor deficits.                Scheduled Meds:amLODIPine, 5 mg, Oral, Q24H  arformoterol, 15 mcg, Nebulization, BID - RT  aspirin, 81 mg, Oral, Daily  budesonide, 0.5 mg, Nebulization, BID  [START ON 2024] enoxaparin, 40 mg, Subcutaneous, Daily  ferrous sulfate, 325 mg, Oral, BID With Meals  [Held by provider] metoprolol succinate XL, 12.5 mg, Oral, Q24H  nicotine, 1 patch, Transdermal, Q24H  pravastatin, 40 mg, Oral, Nightly  Psyllium, 1 packet, Oral, Daily  senna-docusate sodium, 2 tablet, Oral, BID  sertraline, 25 mg, Oral, Daily  sodium chloride, 10 mL, Intravenous, Q12H  tamsulosin, 0.4 mg, Oral, Nightly  vitamin B-12, 1,000 mcg, Oral, Daily  vitamin D, 50,000 Units, Oral, Q7 Days      Continuous Infusions:lactated ringers, 9 mL/hr           Result Review    Result Review:  I have personally reviewed the results from the time of this admission to 2024 15:53 EDT and agree with these findings:  [x]  Laboratory  []  Microbiology  [x]  Radiology  [x]  EKG/Telemetry   [x]  Cardiology/Vascular   []  Pathology  []  Old  records  []  Other:  Most notable findings include:     CBC          8/3/2024    04:15 8/4/2024    04:06 8/5/2024    04:18   CBC   WBC 7.19  6.13  8.27    RBC 2.75  2.66  2.86    Hemoglobin 8.6  8.3  9.0    Hematocrit 26.5  25.4  27.6    MCV 96.4  95.5  96.5    MCH 31.3  31.2  31.5    MCHC 32.5  32.7  32.6    RDW 15.5  14.8  14.5    Platelets 173  193  210      CMP          8/4/2024    04:06 8/5/2024    04:18 8/6/2024    09:08   CMP   Glucose 68  90  92    BUN 17  17  21    Creatinine 0.48  0.44  0.54    EGFR 93.5  95.5  90.9    Sodium 137  137  139    Potassium 3.4  3.3  3.4    Chloride 102  99  100    Calcium 8.8  9.2  9.0    BUN/Creatinine Ratio 35.4  38.6  38.9    Anion Gap 8.2  12.1  9.8       CARDIAC LABS:         Assessment & Plan   Assessment / Plan     Brief Patient Summary:  Danielle Granda is a 84 y.o. female with  had recent fall and left hip fracture post op after ortho surgical repair. She also had a left clavicular fracture with chest wall hematoma. The patient has episodes of bradycardia and sinus pauses but EKG showed normal sinus rhythm.     Active Hospital Problems:  Active Hospital Problems    Diagnosis    • Bradycardia, sinus    • Postoperative anemia due to acute blood loss    • Essential hypertension    • Closed intertrochanteric fracture of hip, left, initial encounter    • COPD (chronic obstructive pulmonary disease)            Plan:     I would continue with medical therapy. The 2 DECHO showed normal EF and mild diastolic dysfunction. May use low dose beta-blockers due to episodes of tachycardia. Would obtain a cardiac monitor as outpatient to evaluate for possible Sick Sinus Syndrome.     I spent over 25 minutes of time during this evaluation including 50 % of the time in coordination of care.        CODE STATUS:   Level Of Support Discussed With: Patient  Code Status (Patient has no pulse and is not breathing): CPR (Attempt to Resuscitate)  Medical Interventions (Patient has pulse or is  breathing): Full Support      Electronically signed by Orville Serra MD, 08/06/24, 3:53 PM EDT.

## 2024-08-06 NOTE — THERAPY TREATMENT NOTE
Acute Care - Physical Therapy Treatment Note   [place]     Patient Name: Danielle Granda  : 1940  MRN: [id number]  Today's Date: 2024      Visit Dx:     ICD-10-CM ICD-9-CM   1. Closed intertrochanteric fracture of hip, left, initial encounter  S72.142A 820.21   2. Difficulty in walking  R26.2 719.7     Patient Active Problem List   Diagnosis    Fracture    Closed intertrochanteric fracture of hip, left, initial encounter    COPD (chronic obstructive pulmonary disease)    Postoperative anemia due to acute blood loss    Essential hypertension    Bradycardia, sinus     Past Medical History:   Diagnosis Date    Anxiety     Hyperlipidemia     Hypertension      Past Surgical History:   Procedure Laterality Date    HIP INTERTROCHANTERIC NAILING Left 2024    Procedure: HIP INTERTROCHANTERIC NAILING;  Surgeon: Dutch Farmer MD;  Location: [facility] MAIN OR;  Service: Orthopedics;  Laterality: Left;    HYSTERECTOMY       PT Assessment (Last 12 Hours)       PT Evaluation and Treatment       Row Name 24 151          Physical Therapy Time and Intention    Subjective Information complains of;weakness (P)   LLE  -TL     Document Type therapy note (daily note) (P)   -TL     Mode of Treatment individual therapy;physical therapy (P)   -TL     Patient Effort fair (P)   -TL     Symptoms Noted During/After Treatment fatigue (P)   -TL       Row Name 24 151          Cognition    Orientation Status (Cognition) oriented x 3 (P)   -TL       Row Name 24 151          Bed Mobility    Bed Mobility supine-sit-supine (P)   -TL     Supine-Sit-Supine [place] (Bed Mobility) minimum assist (75% patient effort);verbal cues;1 person assist (P)   -TL     Bed Mobility, Safety Issues decreased use of arms for pushing/pulling;decreased use of legs for bridging/pushing (P)   -TL     Assistive Device (Bed Mobility) bed rails;draw sheet;head of bed elevated (P)   -TL       Row Name 24 1510          Transfers     Transfers sit-stand transfer;stand-sit transfer (P)   -TL       Row Name 08/06/24 1510          Sit-Stand Transfer    Sit-Stand [place] (Transfers) minimum assist (75% patient effort);verbal cues;1 person assist (P)   -TL     Assistive Device (Sit-Stand Transfers) walker, front-wheeled (P)   -TL       Row Name 08/06/24 1510          Stand-Sit Transfer    Stand-Sit [place] (Transfers) minimum assist (75% patient effort);verbal cues;1 person assist (P)   -TL     Assistive Device (Stand-Sit Transfers) walker, front-wheeled (P)   -TL       Row Name 08/06/24 1510          Gait/Stairs (Locomotion)    Comment, (Gait/Stairs) Pt was unable to ambulate this visit.  Pt stood up and put her feet in front of each other and would not move her feet after multiple verbal and tactile cues. (P)   -TL       Row Name 08/06/24 1510          Balance    Balance Assessment standing static balance (P)   -TL     Static Standing Balance minimal assist;verbal cues;1-person assist (P)   -TL     Position/Device Used, Standing Balance walker, front-wheeled (P)   -TL       Row Name             Wound 07/30/24 1655 Left anterior hip Incision    Wound - Properties Group Placement Date: 07/30/24  -SP Placement Time: 1655  -SP Present on Original Admission: N  -SP Side: Left  -SP Orientation: anterior  -SP Location: hip  -SP Primary Wound Type: Incision  -SP    Retired Wound - Properties Group Placement Date: 07/30/24  -SP Placement Time: 1655  -SP Present on Original Admission: N  -SP Side: Left  -SP Orientation: anterior  -SP Location: hip  -SP Primary Wound Type: Incision  -SP    Retired Wound - Properties Group Date first assessed: 07/30/24  -SP Time first assessed: 1655  -SP Present on Original Admission: N  -SP Side: Left  -SP Location: hip  -SP Primary Wound Type: Incision  -SP      Row Name             Wound 08/05/24 0547 Left anterior greater trochanter Blisters    Wound - Properties Group Placement Date: 08/05/24  -JH Placement  Time: 0547 -JH Present on Original Admission: N  -JH Side: Left  -JH Orientation: anterior  -JH Location: greater trochanter  -JH Primary Wound Type: Blisters  -JH    Retired Wound - Properties Group Placement Date: 08/05/24  -JH Placement Time: 0547 -JH Present on Original Admission: N  -JH Side: Left  -JH Orientation: anterior  -JH Location: greater trochanter  -JH Primary Wound Type: Blisters  -JH    Retired Wound - Properties Group Date first assessed: 08/05/24  - Time first assessed: 0547 -JH Present on Original Admission: N  -JH Side: Left  -JH Location: greater trochanter  -JH Primary Wound Type: Blisters  -JH      Row Name             Wound 08/06/24 1022 Right gluteal Pressure Injury    Wound - Properties Group Placement Date: 08/06/24  -KE Placement Time: 1022  -KE Side: Right  -KE Location: gluteal  -KE Primary Wound Type: Pressure inj  -KE    Retired Wound - Properties Group Placement Date: 08/06/24  -KE Placement Time: 1022  -KE Side: Right  -KE Location: gluteal  -KE Primary Wound Type: Pressure inj  -KE    Retired Wound - Properties Group Date first assessed: 08/06/24  -KE Time first assessed: 1022  -KE Side: Right  -KE Location: gluteal  -KE Primary Wound Type: Pressure inj  -KE      Row Name 08/06/24 1510          Positioning and Restraints    Pre-Treatment Position in bed (P)   -TL     Post Treatment Position bed (P)   -TL     In Bed notified nsg;supine;call light within reach;encouraged to call for assist;exit alarm on;with family/caregiver (P)   -TL       Row Name 08/06/24 1510          Progress Summary (PT)    Daily Progress Summary (PT) Pt did not perform well with treatment this visit.  Pt demonstrated inability to ambulate this visit due to not being able to put her feet side by side on the ground after multiple verbal and tactile cues.  Continue to progress as tolerated. (P)   -TL               User Key  (r) = Recorded By, (t) = Taken By, (c) = Cosigned By      Initials Name Provider  Type    Lorna Burleson, RN Registered Nurse    Wilfred Sorenson, RN Registered Nurse    Suzette Oliver RN Registered Nurse    TL Blanka Knott, PT Student PT Student                      PT Recommendation and Plan     Progress Summary (PT)  Daily Progress Summary (PT): (P) Pt did not perform well with treatment this visit.  Pt demonstrated inability to ambulate this visit due to not being able to put her feet side by side on the ground after multiple verbal and tactile cues.  Continue to progress as tolerated.   Outcome Measures       Row Name 08/06/24 1500 08/04/24 1500 08/03/24 1601       How much help from another person do you currently need...    Turning from your back to your side while in flat bed without using bedrails? 3 (P)   -TL 3  -CS 3  -TS    Moving from lying on back to sitting on the side of a flat bed without bedrails? 3 (P)   -TL 3  -CS 3  -TS    Moving to and from a bed to a chair (including a wheelchair)? 3 (P)   -TL 3  -CS 3  -TS    Standing up from a chair using your arms (e.g., wheelchair, bedside chair)? 3 (P)   -TL 3  -CS 3  -TS    Climbing 3-5 steps with a railing? 3 (P)   -TL 3  -CS 2  -TS    To walk in hospital room? 3 (P)   -TL 3  -CS 3  -TS    AM-PAC 6 Clicks Score (PT) 18 (P)   -TL 18  -CS 17  -TS    Highest Level of Mobility Goal 6 --> Walk 10 steps or more (P)   -TL 6 --> Walk 10 steps or more  -CS 5 --> Static standing  -TS       Functional Assessment    Outcome Measure Options AM-PAC 6 Clicks Basic Mobility (PT) (P)   -TL AM-PAC 6 Clicks Basic Mobility (PT)  -CS --              User Key  (r) = Recorded By, (t) = Taken By, (c) = Cosigned By      Initials Name Provider Type    Bernard Espinosa PTA Physical Therapist Assistant    Kevin Walton PTA Physical Therapist Assistant    TL Blanka Knott, PT Student PT Student                     Time Calculation:    PT Charges       Row Name 08/06/24 1508             Time Calculation    PT Received On 08/06/24 (P)   -TL          Timed Charges    21544 - PT Therapeutic Exercise Minutes 15 (P)   -TL         Total Minutes    Timed Charges Total Minutes 15 (P)   -TL       Total Minutes 15 (P)   -TL                User Key  (r) = Recorded By, (t) = Taken By, (c) = Cosigned By      Initials Name Provider Type    Blanka Garcia, PT Student PT Student                  Therapy Charges for Today       Code Description Service Date Service Provider Modifiers Qty    [id number] HC PT THER PROC EA 15 MIN 8/6/2024 Blanka Knott, PT Student GP 1            PT G-Codes  Outcome Measure Options: (P) AM-PAC 6 Clicks Basic Mobility (PT)  AM-PAC 6 Clicks Score (PT): (P) 18  AM-PAC 6 Clicks Score (OT): 10    Blanka Knott PT Student  8/6/2024

## 2024-08-06 NOTE — CONSULTS
[facility]   Consult Note    Patient Name: Danielle Granda  : 1940  MRN: [id number]  Primary Care Physician: Santo Carter MD  Referring Physician: No ref. provider found  Date of admission: 2024    Subjective   Subjective     Reason for Consult/ Chief Complaint: History of fall at home fracture of left femur, intertrochanteric fracture  Admitted with acute urinary retention.  S/p surgical internal fixation she is comfortable now and going home    HPI:  Danielle Granda is a 84 y.o. female who has a history of smoking 1-1 and half pack per day for the last 60 years and still continues to smoke ,has history of fall at home which resulted into fracture of her left femur that was surgically corrected and now she is planning to go home.  She ambulates with minimal assessment does not have any acute respiratory distress.  She is denying any cough or wheezing not coughing up any large amount of yellow-green phlegm and no hemoptysis.  She had a CT scan of the chest done which is showing left upper lobe 1.2 cm nodule noncalcified that needs to be evaluated and followed further.  Her son Romaine Rivera was present in the room I discussed with him and have advised him that she should follow-up with me in my office in 1 week postdischarge.  After that we will arrange a CT scan of the chest within 3 months of this recent CT scan and then follow her from there.  She also understands it well and agrees to follow.  I have explained to her the possibility of malignancy and also explained to her son who understands it very well.  They were made very clear that the need to follow closely so that we can follow this pulmonary nodule and make an accurate diagnosis.  Her other comorbidities include postoperative anemia and COPD    Review of Systems   All systems were reviewed and negative except for: Restricted ambulation due to recent surgery and fracture of left femur    Personal History     Past Medical History:    Diagnosis Date   • Anxiety    • Hyperlipidemia    • Hypertension        Past Surgical History:   Procedure Laterality Date   • HIP INTERTROCHANTERIC NAILING Left 7/30/2024    Procedure: HIP INTERTROCHANTERIC NAILING;  Surgeon: Dutch Farmer MD;  Location: [facility] MAIN OR;  Service: Orthopedics;  Laterality: Left;   • HYSTERECTOMY         Family History: family history is not on file. Otherwise pertinent FHx was reviewed and not pertinent to current issue.    Social History:  reports that she has been smoking cigarettes. She has never used smokeless tobacco. She reports that she does not drink alcohol and does not use drugs.    Home Medications:  Budeson-Glycopyrrol-Formoterol, Cyanocobalamin, albuterol sulfate HFA, amLODIPine, aspirin, budesonide, cholecalciferol, dilTIAZem, levalbuterol, metoprolol succinate XL, nicotine, pravastatin, sennosides-docusate, sertraline, and traMADol      Allergies:  No Known Allergies    Objective    Objective   Vitals:  Temp:  [98.1 °F (36.7 °C)-98.4 °F (36.9 °C)] 98.2 °F (36.8 °C)  Heart Rate:  [73-88] 80  Resp:  [16] 16  BP: (114-148)/(63-91) 133/63  Flow (L/min):  [1] 1    Physical Exam:   Constitutional: Awake, alert   Eyes: PERRLA, sclerae anicteric, no conjunctival injection   HENT: NCAT, mucous membranes moist   Neck: Supple, no thyromegaly, no lymphadenopathy, trachea midline   Respiratory: Clear to auscultation bilaterally, nonlabored respirations    Cardiovascular: RRR, no murmurs, rubs, or gallops, palpable pedal pulses bilaterally   Gastrointestinal: Positive bowel sounds, soft, nontender, nondistended   Musculoskeletal: No bilateral ankle edema, no clubbing or cyanosis to extremities   Psychiatric: Appropriate affect, cooperative   Neurologic: Oriented x 3, strength symmetric in all extremities, Cranial Nerves grossly intact to confrontation, speech clear   Skin: No rashes         Result Review    Result Review:  I have personally reviewed the results from the time  "of this admission to 08/06/24 4:30 PM EDT and agree with these findings:  [x]  Laboratory  []  Microbiology  [x]  Radiology  []  EKG/Telemetry   []  Cardiology/Vascular   []  Pathology  []  Old records  []  Other:  Most notable findings include: Mild emphysema with 1.2 cm noncalcified pulmonary nodule in the apex of the left lung which is suspected for primary malignancy.  Follow-up low-dose CT chest in 3 months as recommended and we will consider doing a PET scan after that.    Results from last 7 days   Lab Units 08/05/24  0418 08/04/24  0406 08/03/24  0415 08/02/24  0412 08/01/24  1940/24  0404 07/31/24  0406   WBC 10*3/mm3 8.27 6.13 7.19 9.45  --  9.04 7.02   HEMOGLOBIN g/dL 9.0* 8.3* 8.6* 9.9* 8.7* 7.1* 8.2*   HEMATOCRIT % 27.6* 25.4* 26.5* 29.9* 27.2* 23.2* 26.1*   PLATELETS 10*3/mm3 210 193 173 191  --  164 150     Results from last 7 days   Lab Units 08/06/24  0908 08/05/24  0418 08/04/24  0406 08/03/24  0415 08/02/24  0412 08/01/24  0404 07/31/24  0406   SODIUM mmol/L 139 137 137 137 138 143 139   POTASSIUM mmol/L 3.4* 3.3* 3.4* 3.6 3.8 4.2 4.4   CHLORIDE mmol/L 100 99 102 101 104 110* 106   CO2 mmol/L 29.2* 25.9 26.8 26.4 24.5 24.7 23.4   BUN mg/dL 21 17 17 15 16 22 26*   CREATININE mg/dL 0.54* 0.44* 0.48* 0.48* 0.53* 0.68 0.63   CALCIUM mg/dL 9.0 9.2 8.8 9.2 9.3 8.6 8.9   GLUCOSE mg/dL 92 90 68 76 96 138* 200*     No results for input(s): \"PHART\", \"FEI8HMR\", \"PO2ART\", \"PJK3QIZ\", \"BASEEXCESS\" in the last 8760 hours.    No radiology results for the last day     Assessment & Plan   Assessment / Plan     Brief Patient Summary:  Danielle Granda is a 84 y.o. female who is admitted with history of fall and having intertrochanteric fracture of her left femur.  S/p surgical fixation.  She has long history of smoking and still continues to smoke 1 pack/day has been smoking for the last 60 years.  She does not have any acute respiratory issues, no cough no wheezing not coughing up any yellow-green phlegm no " hemoptysis.  She denies any fever or chills no chest pain or chest discomfort with breathing.  Discussed with her and with her son about the pulmonary nodule on the CT scan of the chest and the need to follow it closely.  Explained to them the need to repeat the CT scan in 3 months and possibly she will also require PET/CT to evaluate this nodule.  They have been advised to keep follow-up with me in my office I will schedule CT and PET/CT and then we will consider doing further workup including biopsy for the diagnosis.  I discussed with her primary physician Dr. DAVID sherman also.  Active Hospital Problems:  Active Hospital Problems    Diagnosis    • Bradycardia, sinus    • Postoperative anemia due to acute blood loss    • Essential hypertension    • Closed intertrochanteric fracture of hip, left, initial encounter    • COPD (chronic obstructive pulmonary disease)        Plan:   She is stable and can go home.  I have recommended them to follow-up with me  1 week postdischarge.  Will consider repeating the CT scan in 3 months and possibly doing a PET/CT also to evaluate her pulmonary nodule.  Current situation has been explained to her in detail and to her son Romaine Rivera they understand it very well.  I have notified them that they should keep regular follow-up and should not miss the appointment so that we can make accurate diagnosis and keep a follow-up on her post discharge.  Discussed with Dr. Mckinnon and explained my plan.  Thank you very much Dr. SUBHA Carter for asking me to see your nice patient I will follow her with you.      Electronically signed by Chester Lieberman MD, 08/06/24, 4:30 PM EDT.

## 2024-08-06 NOTE — SIGNIFICANT NOTE
Wound Eval / Progress Noted     [facility]     Patient Name: Danielle Granda  : 1940  MRN: [id number]  Today's Date: 2024                 Admit Date: 2024    Visit Dx:    ICD-10-CM ICD-9-CM   1. Closed intertrochanteric fracture of hip, left, initial encounter  S72.142A 820.21   2. Difficulty in walking  R26.2 719.7         Closed intertrochanteric fracture of hip, left, initial encounter    COPD (chronic obstructive pulmonary disease)    Postoperative anemia due to acute blood loss    Essential hypertension    Bradycardia, sinus        Past Medical History:   Diagnosis Date    Anxiety     Hyperlipidemia     Hypertension         Past Surgical History:   Procedure Laterality Date    HIP INTERTROCHANTERIC NAILING Left 2024    Procedure: HIP INTERTROCHANTERIC NAILING;  Surgeon: Dutch Farmer MD;  Location: [facility] MAIN OR;  Service: Orthopedics;  Laterality: Left;    HYSTERECTOMY           Physical Assessment:  Wound 24 0547 Left anterior greater trochanter Blisters (Active)   Wound Image   24 1022   Dressing Appearance dry;intact 24 1022   Closure None 24 1022   Base other (see comments);pink 24 1022   Periwound pink;dry;ecchymotic 24 1022   Periwound Temperature warm 24 1022   Periwound Skin Turgor soft 24 1022   Edges rolled/closed 24 1022   Drainage Amount none 24 1022   Care, Wound cleansed with;sterile normal saline 24 1022   Dressing Care dressing removed;dressing applied;border dressing;gauze;petroleum-based;non-adherent;silicone 24 1022   Periwound Care absorptive dressing applied 24 1022       Wound 24 1022 Right gluteal Pressure Injury (Active)   Wound Image   24 1022   Pressure Injury Stage 2 24 1022   Dressing Appearance open to air 24 1022   Closure None 24 1022   Base moist;red;non-blanchable 24 1022   Red (%), Wound Tissue Color 100 24 1022   Periwound  blanchable;pink;redness 08/06/24 1022   Periwound Temperature warm 08/06/24 1022   Periwound Skin Turgor soft 08/06/24 1022   Edges open 08/06/24 1022   Wound Length (cm) 1.4 cm 08/06/24 1022   Wound Width (cm) 0.8 cm 08/06/24 1022   Wound Depth (cm) 0.1 cm 08/06/24 1022   Wound Surface Area (cm^2) 1.12 cm^2 08/06/24 1022   Wound Volume (cm^3) 0.112 cm^3 08/06/24 1022   Drainage Characteristics/Odor serosanguineous 08/06/24 1022   Drainage Amount scant 08/06/24 1022   Care, Wound cleansed with;sterile normal saline 08/06/24 1022   Dressing Care open to air;skin barrier agent applied 08/06/24 1022   Periwound Care barrier ointment applied 08/06/24 1022        Wound Check / Follow-up:  Patient seen today for wound consult. Patient is awake, alert and oriented; son is present at bedside but left room prior to assessment. Patient is post-op a left intertrochanteric nailing on 7/30. Surgical dressing to left hip is visibly clean, dry and intact. Patient states she believes she will be discharging home today but is agreeable to assessment.     Partially reabsorbed, fluid-filled blister noted to left hip with visible pink base. Cleansed with normal saline and gauze. Recommending every other day dressing changes with non-adherent, petroleum-based gauze and silicone border dressing.     Right gluteal aspect stage II pressure injury with moist, red, nonblanchable tissue. Periwound is blanchable with pink/redness noted. Cleansed tissue with normal saline and gauze, blotted dry and applied a thin later of orange-top moisture barrier. Recommending daily dressing changes with silver impregnated hydrofiber to wound base with silicone border dressing securement. Implement Q2H turns and offload at all times. Keep the patient clean and free from all moisture. Recommend to provide quality skin care and hygiene, apply blue top moisture barrier TID/PRN incontinence to surrounding skin.     Impression: Blister to left hip. Stage II  pressure injury to right gluteal aspect.    Short term goals:  Regain skin integrity, skin protection, moisture prevention, pressure reduction, quality skin care and hygiene, daily dressing changes, every other day dressing changes    Lorna Gambino RN    8/6/2024    13:05 EDT

## 2024-08-06 NOTE — PLAN OF CARE
Goal Outcome Evaluation:  Plan of Care Reviewed With: patient        Progress: no change  Outcome Evaluation: Pt. remains A & O X 4. PIV remains in place to LUE. Family remains at bedside. NPO since 0000 this shift tentatively. No new concerns stated. Pt. Turned PRN and encouraged to turn. Calazime cream placed to sacrum PRN for blanchable area.
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
## 2024-08-07 NOTE — DISCHARGE SUMMARY
[facility]         DISCHARGE SUMMARY    Patient Name: Danielle Granda  : 1940  MRN: [id number]    Date of Admission: 2024  Date of Discharge:   Primary Care Physician: Santo Carter MD    Consults       Date and Time Order Name Status Description    2024  8:51 AM Inpatient Pulmonology Consult Completed     2024 11:05 AM Inpatient Cardiology Consult      2024  4:37 PM Orthopedics (on-call MD unless specified)      2024  4:37 PM Hospitalist (on-call MD unless specified)              Presenting Problem:   Fracture [T14.8XXA]  Closed intertrochanteric fracture of hip, left, initial encounter [S72.142A]  Intertrochanteric fracture of left femur [S72.142A]      LEFT lung mass, 1.2 cm workup pending      Active and Resolved Hospital Problems:  Active Hospital Problems    Diagnosis POA   • Bradycardia, sinus [R00.1] Unknown   • Postoperative anemia due to acute blood loss [D62] Yes   • Essential hypertension [I10] Yes   • Closed intertrochanteric fracture of hip, left, initial encounter [S72.142A] Yes   • COPD (chronic obstructive pulmonary disease) [J44.9] Yes      Resolved Hospital Problems    Diagnosis POA   • Intertrochanteric fracture of left femur [S72.142A] Yes   • Acute urinary retention [R33.8] No   • Fall [W19.XXXA] Yes         Hospital Course     Hospital Course:  Danielle Granda is a 84 y.o. female admitted to the hospital post fall and sustained hip fracture.  [facility] and then further transferred to  care per patient's request.    Patient had a slow recovery and complicated course of stay in hospital.  Episodes of bradycardia noted.  She was not short acting Cardizem 90 mg daily prescribed by her PCP.  Subsequently she was switched to beta-blocker long-acting and Cardizem was discontinued.  She also had complications with urinary retention which was treated with Flomax and Urecholine.  She improved  with these measures.  She also had anemia secondary to blood loss which was treated with transfusion.    Patient was ready for discharge and inpatient rehab was requested.  Insurance initially denied and after long discussion and [id number] review they approved it.  But family and patient were upset and they wanted to go home as she stayed long enough in the hospital.    As patient was about to be discharged she had episode of bradycardia which we noticed earlier also.  Cardiologist was consulted and he was concerned about sick sinus syndrome.  Beta-blocker was reduced.  He recommended discharging patient and outpatient event monitor to be placed.    During her initial workup a CT scan of the chest revealed a lung nodule/mass suspicious of malignancy.  Patient was seen by pulmonologist Dr. Albarran who will follow for this issue as an outpatient with a PET scan or biopsy.        DISCHARGE Follow Up Recommendations for labs and diagnostics:     Discharge to home with outpatient follow-up, follow-up with consultants as recommended      Day of Discharge     Vital Signs:  Temp:  [98.2 °F (36.8 °C)-98.8 °F (37.1 °C)] 98.8 °F (37.1 °C)  Heart Rate:  [73-87] 87  Resp:  [16-18] 18  BP: (125-148)/(63-91) 125/65  Flow (L/min):  [1] 1    Physical Exam:    Elderly female not in acute distress.  Heart regular.  Lungs clear diminished breath sounds.  Abdomen soft.  Extremities no edema      Pertinent  and/or Most Recent Results     LAB RESULTS:      Lab 08/05/24  0418 08/04/24  0406 08/03/24  0415 08/02/24  0412 08/01/24  1940/24  0404   WBC 8.27 6.13 7.19 9.45  --  9.04   HEMOGLOBIN 9.0* 8.3* 8.6* 9.9* 8.7* 7.1*   HEMATOCRIT 27.6* 25.4* 26.5* 29.9* 27.2* 23.2*   PLATELETS 210 193 173 191  --  164   NEUTROS ABS 6.81 4.46 5.55 7.52*  --  7.18*   IMMATURE GRANS (ABS) 0.07* 0.06* 0.06* 0.13*  --  0.05   LYMPHS ABS 0.77 1.04 0.99 1.18  --  1.09   MONOS ABS 0.54 0.46 0.49 0.58  --  0.68   EOS ABS 0.06 0.09 0.08 0.02  --  0.02    MCV 96.5 95.5 96.4 94.6  --  102.2*         Lab 08/06/24  0908 08/05/24  0418 08/04/24  0406 08/03/24  0415 08/02/24  1351 08/02/24  0412   SODIUM 139 137 137 137  --  138   POTASSIUM 3.4* 3.3* 3.4* 3.6  --  3.8   CHLORIDE 100 99 102 101  --  104   CO2 29.2* 25.9 26.8 26.4  --  24.5   ANION GAP 9.8 12.1 8.2 9.6  --  9.5   BUN 21 17 17 15  --  16   CREATININE 0.54* 0.44* 0.48* 0.48*  --  0.53*   EGFR 90.9 95.5 93.5 93.5  --  91.3   GLUCOSE 92 90 68 76  --  96   CALCIUM 9.0 9.2 8.8 9.2  --  9.3   MAGNESIUM 1.6 1.8 1.7 1.6  --  1.7   PHOSPHORUS  --  2.8 3.1 2.9 2.7 2.2*                     Lab 08/01/24  0916 08/01/24  0404 07/31/24  0406   IRON  --   --  19*   IRON SATURATION (TSAT)  --   --  11*   TIBC  --   --  173*   TRANSFERRIN  --   --  116*   FERRITIN  --   --  543.20*   ABO TYPING O   < >  --    RH TYPING Positive   < >  --    ANTIBODY SCREEN Negative  --   --     < > = values in this interval not displayed.         Brief Urine Lab Results  (Last result in the past 365 days)        Color   Clarity   Blood   Leuk Est   Nitrite   Protein   CREAT   Urine HCG        08/02/24 0134 Dark Yellow   Cloudy   Negative   Small (1+)   Negative   Negative                 Microbiology Results (last 10 days)       ** No results found for the last 240 hours. **            PROCEDURES:    XR Chest 1 View    Result Date: 7/29/2024  Impression: No radiographic findings of acute cardiopulmonary abnormality. Electronically Signed: Lyle Corona  7/29/2024 4:26 PM EDT  Workstation ID: [id number]    XR Hip With or Without Pelvis 2 - 3 View Left    Result Date: 7/29/2024  Impression: Impression: 1. Comminuted intertrochanteric fracture of the left femoral neck with mild impaction. 2. Diffuse osteopenia. Electronically Signed: Yaniv Pal MD  7/29/2024 3:08 PM EDT  Workstation ID: [id number]    CT Chest Without Contrast Diagnostic    Result Date: 7/12/2024  Impression: 1.  Mild emphysema with a 1.2 cm noncalcified nodule in the apex of the  left lung worrisome for primary adenocarcinoma. Follow-up low-dose CT in 3 months is recommended. PET/CT scan may be useful. 2.  Acute fracture of the medial aspect left clavicle with surrounding chest wall hematoma. Electronically Signed: Dada Edwards MD 2024/07/12 at 8:07 CDT Reading Location ID and State: [address] / [place] Tel [phone], Service support  [phone], Fax [phone]    CT Cervical Spine Without Contrast    Result Date: 7/12/2024  Impression: No evidence of fracture or subluxation in the cervical spine. Acute displaced medial left clavicle fracture with overlying hematoma. CT chest may be helpful to exclude intrathoracic injury. Left upper lobe 1.4 cm pulmonary nodule, malignancy not excluded. Electronically Signed: Dorota Kiran MD 2024/07/12 at 7:01 CDT Reading Location ID and State: [address] / [place] Tel [phone], Service support  [phone], Fax [phone]    CT Head Without Contrast    Result Date: 7/12/2024  Impression: No acute abnormality. Chronic microvascular ischemic disease. Electronically Signed: Dorota Kiran MD 2024/07/12 at 6:54 CDT Reading Location ID and State: [address] / [place] Tel [phone], Service support  [phone], Fax [phone]    XR Shoulder 2+ View Left    Result Date: 7/12/2024  Impression: No evidence of fracture. Electronically Signed: Dorota Kiran MD 2024/07/12 at 6:51 CDT Reading Location ID and State: [address] / [place] Tel [phone], Service support  [phone], Fax [phone]             Results for orders placed during the hospital encounter of 07/29/24    Adult Transthoracic Echo Complete W/ Cont if Necessary Per Protocol    Interpretation Summary  •  Left ventricular ejection fraction appears to be 61 - 65%.  •  Left ventricular diastolic function is consistent with (grade I) impaired relaxation.  •  Estimated right ventricular systolic pressure from tricuspid regurgitation is normal (<35 mmHg).      Labs Pending at  Discharge:        Discharge Details        Discharge Medications        New Medications        Instructions Start Date   albuterol sulfate  (90 Base) MCG/ACT inhaler  Commonly known as: PROVENTIL HFA;VENTOLIN HFA;PROAIR HFA   2 puffs, Inhalation, Every 4 Hours PRN      amLODIPine 5 MG tablet  Commonly known as: NORVASC   5 mg, Oral, Every 24 Hours Scheduled      Breztri Aerosphere 160-9-4.8 MCG/ACT aerosol inhaler  Generic drug: Budeson-Glycopyrrol-Formoterol   2 puffs, Inhalation, 2 Times Daily      metoprolol succinate XL 25 MG 24 hr tablet  Commonly known as: TOPROL-XL   12.5 mg, Oral, Every 24 Hours Scheduled      nicotine 21 MG/24HR patch  Commonly known as: NICODERM CQ   1 patch, Transdermal, Every 24 Hours Scheduled      sennosides-docusate 8.6-50 MG per tablet  Commonly known as: PERICOLACE   2 tablets, Oral, Every 12 Hours PRN             Changes to Medications        Instructions Start Date   aspirin 325 MG EC tablet  What changed:   medication strength  how much to take   325 mg, Oral, Daily      traMADol 50 MG tablet  Commonly known as: ULTRAM  What changed: when to take this   50 mg, Oral, Every 6 Hours PRN             Continue These Medications        Instructions Start Date   pravastatin 40 MG tablet  Commonly known as: PRAVACHOL   40 mg, Oral, Daily      sertraline 25 MG tablet  Commonly known as: ZOLOFT   25 mg, Oral, Daily             Stop These Medications      budesonide 0.5 MG/2ML nebulizer solution  Commonly known as: PULMICORT     cholecalciferol 1.25 MG (47587 UT) capsule  Commonly known as: VITAMIN D3     Cyanocobalamin 2500 MCG sublingual tablet     dilTIAZem 90 MG tablet  Commonly known as: CARDIZEM     levalbuterol 1.25 MG/3ML nebulizer solution  Commonly known as: XOPENEX              No Known Allergies      Discharge Disposition:    Home-Health Care [facility]    Diet:    Heart healthy    Discharge Activity:     Activity Instructions       Activity as Tolerated      Activity as  Tolerated              Future Appointments   Date Time Provider Department Center   8/12/2024  1:45 PM [place] [facility] [place]       Additional Instructions for the Follow-ups that You Need to Schedule       Discharge Follow-up with PCP   As directed       Currently Documented PCP:    Provider, No Known    PCP Phone Number:    None     Follow Up Details: Dr. Santo Carter next week        Discharge Follow-up with PCP   As directed       Currently Documented PCP:    Provider, No Known    PCP Phone Number:    None     Follow Up Details: Dr ANASTASIIA Carter next week, AUG 14 at 1130 AM        Discharge Follow-up with Specified Provider: Orthopedic surgeon as recommended   As directed      To: Orthopedic surgeon as recommended        Discharge Follow-up with Specified Provider: Orthopedic surgeon as recommended   As directed      To: Orthopedic surgeon as recommended                Time spent on Discharge including face to face service: 43 minutes.            I have dictated this note utilizing Dragon Dictation.             Please note that portions of this note were completed with a voice recognition program.             Part of this note may be an electronic transcription/translation of spoken language to printed text         using the Dragon Dictation System.       Electronically signed by Santo Carter MD, 08/06/24, 8:24 PM EDT.

## 2024-08-07 NOTE — ED PROVIDER NOTES
"Time: 8:21 PM EDT  Date of encounter:  8/6/2024  Independent Historian/Clinical History and Information was obtained by:   Patient and Family    History is limited by: N/A    Chief Complaint: Wellness check      History of Present Illness:  Patient is a 84 y.o. year old female who presents to the emergency department for evaluation of wellness check.  Per family member states that she just got discharged today for a left hip replacement due to a fall.  Patient was being transferred into her car by some nurses today and family member states that they were not handling her correctly and didn't use the strap to move her.  Mom was lifting her arm and arms and otherwise was trying to push up on her legs causing her discomfort.  They felt like she had a syncopal episode because she \"had her eyes rolled back\" they advised the family member to get her checked out in the ED.  Patient denies syncope, dizziness, lightheadedness or hip pain.  Patient and family states she was just in a lot of pain from the way they were handling her and she is completely baseline      Patient Care Team  Primary Care Provider: Santo Carter MD    Past Medical History:     No Known Allergies  Past Medical History:   Diagnosis Date    Anxiety     Hyperlipidemia     Hypertension      Past Surgical History:   Procedure Laterality Date    HIP INTERTROCHANTERIC NAILING Left 7/30/2024    Procedure: HIP INTERTROCHANTERIC NAILING;  Surgeon: Dutch Farmer MD;  Location: [facility];  Service: Orthopedics;  Laterality: Left;    HYSTERECTOMY       History reviewed. No pertinent family history.    Home Medications:  Prior to Admission medications    Medication Sig Start Date End Date Taking? Authorizing Provider   albuterol sulfate  (90 Base) MCG/ACT inhaler Inhale 2 puffs Every 4 (Four) Hours As Needed for Wheezing or Shortness of Air for up to 30 days. 8/5/24 9/4/24  Santo Carter MD   amLODIPine (NORVASC) 5 MG tablet Take 1 tablet by mouth " Daily for 30 days. 8/5/24 9/4/24  Santo Carter MD   aspirin 325 MG EC tablet Take 1 tablet by mouth Daily for 30 days. 8/5/24 9/4/24  Santo Carter MD   Budeson-Glycopyrrol-Formoterol (Breztri Aerosphere) 160-9-4.8 MCG/ACT aerosol inhaler Inhale 2 puffs 2 (Two) Times a Day for 30 days. 8/5/24 9/4/24  Santo Carter MD   metoprolol succinate XL (TOPROL-XL) 25 MG 24 hr tablet Take 0.5 tablets by mouth Daily for 30 days. 8/6/24 9/5/24  aSnto Carter MD   nicotine (NICODERM CQ) 21 MG/24HR patch Place 1 patch on the skin as directed by provider Daily for 30 days. 8/5/24 9/4/24  Santo Carter MD   pravastatin (PRAVACHOL) 40 MG tablet Take 1 tablet by mouth Daily. 8/5/24 8/6/25  Santo Carter MD   sennosides-docusate (PERICOLACE) 8.6-50 MG per tablet Take 2 tablets by mouth Every 12 (Twelve) Hours As Needed for Constipation for up to 30 days. 8/5/24 9/4/24  Santo Carter MD   sertraline (ZOLOFT) 25 MG tablet Take 1 tablet by mouth Daily. 8/5/24 8/6/25  Santo Carter MD   traMADol (ULTRAM) 50 MG tablet Take 1 tablet by mouth Every 6 (Six) Hours As Needed for Moderate Pain for up to 30 days. 8/5/24 9/4/24  Santo Carter MD   aspirin 81 MG EC tablet Take 1 tablet by mouth Daily.  8/6/24  Reilly Cruz MD   budesonide (PULMICORT) 0.5 MG/2ML nebulizer solution Take 2 mL by nebulization 2 (Two) Times a Day.  8/6/24  Reilly Cruz MD   cholecalciferol (VITAMIN D3) 1.25 MG (94842 UT) capsule Take 1 capsule by mouth Every 7 (Seven) Days. 6/20/24 8/6/24  Reilly Cruz MD   Cyanocobalamin 2500 MCG sublingual tablet Place 2,500 mcg under the tongue Daily. 6/20/24 8/6/24  Provider, MD Reilly   dilTIAZem (CARDIZEM) 90 MG tablet Take 1 tablet by mouth Daily. 7/17/24 8/6/24  Reilly Cruz MD   levalbuterol (XOPENEX) 1.25 MG/3ML nebulizer solution Take 1 ampule by nebulization Every 6 (Six) Hours As Needed. 7/26/24 8/6/24  Reilly Cruz MD   metoprolol succinate XL (TOPROL-XL) 25 MG 24 hr tablet Take 1  "tablet by mouth Daily for 30 days. 8/5/24 8/6/24  Santo Carter MD   traMADol (ULTRAM) 50 MG tablet Take 1 tablet by mouth Every 8 (Eight) Hours As Needed for Moderate Pain. 7/17/24 8/6/24  Provider, MD Reilly        Social History:   Social History     Tobacco Use    Smoking status: Every Day     Current packs/day: 0.50     Types: Cigarettes    Smokeless tobacco: Never   Vaping Use    Vaping status: Never Used   Substance Use Topics    Alcohol use: Never    Drug use: Never         Review of Systems:  Review of Systems   Constitutional:  Negative for chills and fever.   HENT:  Negative for congestion, ear pain and sore throat.    Eyes:  Negative for pain.   Respiratory:  Negative for cough, chest tightness and shortness of breath.    Cardiovascular:  Negative for chest pain.   Gastrointestinal:  Negative for abdominal pain, diarrhea, nausea and vomiting.   Genitourinary:  Negative for flank pain and hematuria.   Musculoskeletal:  Positive for gait problem (Still painful from surgery). Negative for joint swelling. Arthralgias: Hip pain from surgery.  Skin:  Negative for color change and pallor.   Neurological:  Negative for dizziness, seizures, syncope, light-headedness and headaches.   Hematological: Negative.    Psychiatric/Behavioral: Negative.     All other systems reviewed and are negative.       Physical Exam:  /65 (BP Location: Right arm, Patient Position: Sitting)   Pulse 87   Temp 98.8 °F (37.1 °C) (Oral)   Resp 18   Ht 154.9 cm (60.98\")   Wt 51.5 kg (113 lb 8.6 oz)   SpO2 96%   BMI 21.46 kg/m²     Physical Exam  Vitals and nursing note reviewed.   Constitutional:       General: She is not in acute distress.     Appearance: Normal appearance. She is not toxic-appearing.   HENT:      Head: Normocephalic and atraumatic.      Right Ear: Tympanic membrane, ear canal and external ear normal.      Left Ear: Tympanic membrane, ear canal and external ear normal.      Nose: Nose normal.      " Mouth/Throat:      Mouth: Mucous membranes are moist.   Eyes:      General: No scleral icterus.     Extraocular Movements: Extraocular movements intact.      Conjunctiva/sclera: Conjunctivae normal.      Pupils: Pupils are equal, round, and reactive to light.   Cardiovascular:      Rate and Rhythm: Normal rate and regular rhythm.      Pulses: Normal pulses.      Heart sounds: Normal heart sounds.   Pulmonary:      Effort: Pulmonary effort is normal. No respiratory distress.      Breath sounds: Normal breath sounds.   Abdominal:      General: There is no distension.      Tenderness: There is no abdominal tenderness.   Musculoskeletal:      Cervical back: Normal range of motion and neck supple. No tenderness.   Skin:     General: Skin is warm and dry.   Neurological:      General: No focal deficit present.      Mental Status: She is alert and oriented to person, place, and time. Mental status is at baseline.   Psychiatric:         Mood and Affect: Mood normal.         Behavior: Behavior normal.                Medical Decision Making:      Comorbidities that affect care:    Hypertension, Smoking    External Notes reviewed:    Previous Admission Note: Admission note from July 29 was reviewed patient was admitted for intertrochanteric fracture of the left hip with repair.  Was discharged today and was getting loaded in the car when symptoms started      The following orders were placed and all results were independently analyzed by me:  No orders of the defined types were placed in this encounter.      Medications Given in the Emergency Department:  Medications - No data to display     ED Course:    ED Course as of 08/07/24 0039   Tue Aug 06, 2024   2023 --- PROVIDER IN TRIAGE NOTE ---    The patient was evaluated by meBruce in triage. Orders were placed and the patient is currently awaiting disposition.    [AJ]      ED Course User Index  [AJ] Bruce Lanza PA-C       Labs:    Lab Results (last 24  hours)       Procedure Component Value Units Date/Time    Basic Metabolic Panel [[id number]]  (Abnormal) Collected: 08/06/24 0908    Specimen: Blood from Arm, Left Updated: 08/06/24 0929     Glucose 92 mg/dL      BUN 21 mg/dL      Creatinine 0.54 mg/dL      Sodium 139 mmol/L      Potassium 3.4 mmol/L      Chloride 100 mmol/L      CO2 29.2 mmol/L      Calcium 9.0 mg/dL      BUN/Creatinine Ratio 38.9     Anion Gap 9.8 mmol/L      eGFR 90.9 mL/min/1.73     Narrative:      GFR Normal >60  Chronic Kidney Disease <60  Kidney Failure <15    The GFR formula is only valid for adults with stable renal function between ages 18 and 70.    Magnesium [[id number]]  (Normal) Collected: 08/06/24 0908    Specimen: Blood from Arm, Left Updated: 08/06/24 0929     Magnesium 1.6 mg/dL              Imaging:    Adult Transthoracic Echo Complete W/ Cont if Necessary Per Protocol    Result Date: 8/6/2024    Left ventricular ejection fraction appears to be 61 - 65%.   Left ventricular diastolic function is consistent with (grade I) impaired relaxation.   Estimated right ventricular systolic pressure from tricuspid regurgitation is normal (<35 mmHg).        Differential Diagnosis and Discussion:    Syncope: Differential diagnosis includes but is not limited to TIA, hyperventilation, aortic stenosis, pulmonary emboli, myocardial disease, bradycardia arrhythmia, heart block, tachyarrhythmia, vasovagal, orthostatic hypotension, ruptured AAA, aortic dissection, subarachnoid hemorrhage, seizure, hypoglycemia.        MDM  Number of Diagnoses or Management Options  Physically well but worried  Diagnosis management comments: I have explained the patient´s condition, diagnoses and treatment plan based on the information available to me at this time. I have answered questions and addressed any concerns. The patient has a good  understanding of the patient´s diagnosis, condition, and treatment plan as can be expected at this point. The vital signs have  been stable. The patient´s condition is stable and appropriate for discharge from the emergency department.      The patient will pursue further outpatient evaluation with the primary care physician or other designated or consulting physician as outlined in the discharge instructions. They are agreeable to this plan of care and follow-up instructions have been explained in detail. The patient has received these instructions in written format and have expressed an understanding of the discharge instructions. The patient is aware that any significant change in condition or worsening of symptoms should prompt an immediate return to this or the closest emergency department or call to 911.       Amount and/or Complexity of Data Reviewed  Obtain history from someone other than the patient: yes (Son at bedside)    Risk of Complications, Morbidity, and/or Mortality  Presenting problems: minimal  Management options: minimal    Patient Progress  Patient progress: stable           Patient Care Considerations:    I considered lab work and imaging but patient denies that there was any event and that she feels fine so she does not want any      Consultants/Shared Management Plan:    None    Social Determinants of Health:    Patient has presented with family members who are responsible, reliable and will ensure follow up care.      Disposition and Care Coordination:    Discharged: The patient is suitable and stable for discharge with no need for consideration of admission.    I have explained the patient´s condition, diagnoses and treatment plan based on the information available to me at this time. I have answered questions and addressed any concerns. The patient has a good  understanding of the patient´s diagnosis, condition, and treatment plan as can be expected at this point. The vital signs have been stable. The patient´s condition is stable and appropriate for discharge from the emergency department.      The patient will  pursue further outpatient evaluation with the primary care physician or other designated or consulting physician as outlined in the discharge instructions. They are agreeable to this plan of care and follow-up instructions have been explained in detail. The patient has received these instructions in written format and has expressed an understanding of the discharge instructions. The patient is aware that any significant change in condition or worsening of symptoms should prompt an immediate return to this or the closest emergency department or call to 911.    Final diagnoses:   Physically well but worried        ED Disposition       ED Disposition   Discharge    Condition   Stable    Comment   --               This medical record created using voice recognition software.             Abbi Dykes, DANYELL  08/07/24 0039

## 2024-08-07 NOTE — OUTREACH NOTE
Prep Survey      Flowsheet Row Responses   [facility] facility patient discharged from? [facility]   Is LACE score < 7 ? No   Eligibility Readm Mgmt   Discharge diagnosis Hip intertrochanteric nailing   Does the patient have one of the following disease processes/diagnoses(primary or secondary)? General Surgery   Does the patient have Home health ordered? Yes   What is the Home health agency?  [facility]   Is there a DME ordered? Yes   What DME was ordered? Com[employer] and hospital bed   Prep survey completed? Yes            AKSHAT LAURENT - Registered Nurse

## 2024-08-09 ENCOUNTER — APPOINTMENT (OUTPATIENT)
Dept: CT IMAGING | Facility: HOSPITAL | Age: 84
DRG: 190 | End: 2024-08-09
Payer: MEDICARE

## 2024-08-09 ENCOUNTER — HOSPITAL ENCOUNTER (INPATIENT)
Facility: HOSPITAL | Age: 84
LOS: 2 days | Discharge: HOME | DRG: 190 | End: 2024-08-11
Attending: EMERGENCY MEDICINE | Admitting: INTERNAL MEDICINE
Payer: MEDICARE

## 2024-08-09 ENCOUNTER — READMISSION MANAGEMENT (OUTPATIENT)
Dept: CALL CENTER | Facility: HOSPITAL | Age: 84
End: 2024-08-09
Payer: MEDICARE

## 2024-08-09 ENCOUNTER — APPOINTMENT (OUTPATIENT)
Dept: GENERAL RADIOLOGY | Facility: HOSPITAL | Age: 84
DRG: 190 | End: 2024-08-09
Payer: MEDICARE

## 2024-08-09 DIAGNOSIS — J43.9 PULMONARY EMPHYSEMA, UNSPECIFIED EMPHYSEMA TYPE: ICD-10-CM

## 2024-08-09 DIAGNOSIS — Z74.09 IMPAIRED MOBILITY AND ADLS: ICD-10-CM

## 2024-08-09 DIAGNOSIS — R26.2 DIFFICULTY IN WALKING: ICD-10-CM

## 2024-08-09 DIAGNOSIS — J96.01 ACUTE RESPIRATORY FAILURE WITH HYPOXIA: Primary | ICD-10-CM

## 2024-08-09 DIAGNOSIS — I26.99 OTHER ACUTE PULMONARY EMBOLISM WITHOUT ACUTE COR PULMONALE: ICD-10-CM

## 2024-08-09 DIAGNOSIS — I26.93 SINGLE SUBSEGMENTAL PULMONARY EMBOLISM WITHOUT ACUTE COR PULMONALE: ICD-10-CM

## 2024-08-09 DIAGNOSIS — J90 PLEURAL EFFUSION: ICD-10-CM

## 2024-08-09 DIAGNOSIS — S72.142A CLOSED INTERTROCHANTERIC FRACTURE OF HIP, LEFT, INITIAL ENCOUNTER: ICD-10-CM

## 2024-08-09 DIAGNOSIS — Z78.9 IMPAIRED MOBILITY AND ADLS: ICD-10-CM

## 2024-08-09 PROBLEM — Z87.898 HISTORY OF BRADYCARDIA: Chronic | Status: ACTIVE | Noted: 2024-08-09

## 2024-08-09 PROBLEM — J44.1 COPD EXACERBATION: Status: ACTIVE | Noted: 2024-08-09

## 2024-08-09 LAB
ALBUMIN SERPL-MCNC: 3.4 G/DL (ref 3.5–5.2)
ALBUMIN/GLOB SERPL: 1.4 G/DL
ALP SERPL-CCNC: 119 U/L (ref 39–117)
ALT SERPL W P-5'-P-CCNC: 10 U/L (ref 1–33)
ANION GAP SERPL CALCULATED.3IONS-SCNC: 11.6 MMOL/L (ref 5–15)
AST SERPL-CCNC: 16 U/L (ref 1–32)
BACTERIA UR QL AUTO: ABNORMAL /HPF
BASOPHILS # BLD AUTO: 0.02 10*3/MM3 (ref 0–0.2)
BASOPHILS NFR BLD AUTO: 0.2 % (ref 0–1.5)
BILIRUB SERPL-MCNC: 1.3 MG/DL (ref 0–1.2)
BILIRUB UR QL STRIP: NEGATIVE
BUN SERPL-MCNC: 17 MG/DL (ref 8–23)
BUN/CREAT SERPL: 31.5 (ref 7–25)
CALCIUM SPEC-SCNC: 9.3 MG/DL (ref 8.6–10.5)
CHLORIDE SERPL-SCNC: 100 MMOL/L (ref 98–107)
CLARITY UR: ABNORMAL
CO2 SERPL-SCNC: 28.4 MMOL/L (ref 22–29)
COLOR UR: YELLOW
CREAT SERPL-MCNC: 0.54 MG/DL (ref 0.57–1)
DEPRECATED RDW RBC AUTO: 52.4 FL (ref 37–54)
EGFRCR SERPLBLD CKD-EPI 2021: 90.9 ML/MIN/1.73
EOSINOPHIL # BLD AUTO: 0.01 10*3/MM3 (ref 0–0.4)
EOSINOPHIL NFR BLD AUTO: 0.1 % (ref 0.3–6.2)
ERYTHROCYTE [DISTWIDTH] IN BLOOD BY AUTOMATED COUNT: 15.4 % (ref 12.3–15.4)
GLOBULIN UR ELPH-MCNC: 2.4 GM/DL
GLUCOSE SERPL-MCNC: 113 MG/DL (ref 65–99)
GLUCOSE UR STRIP-MCNC: NEGATIVE MG/DL
HCT VFR BLD AUTO: 28.9 % (ref 34–46.6)
HGB BLD-MCNC: 9.5 G/DL (ref 12–15.9)
HGB UR QL STRIP.AUTO: NEGATIVE
HOLD SPECIMEN: NORMAL
HOLD SPECIMEN: NORMAL
HYALINE CASTS UR QL AUTO: ABNORMAL /LPF
IMM GRANULOCYTES # BLD AUTO: 0.08 10*3/MM3 (ref 0–0.05)
IMM GRANULOCYTES NFR BLD AUTO: 0.8 % (ref 0–0.5)
KETONES UR QL STRIP: ABNORMAL
LEUKOCYTE ESTERASE UR QL STRIP.AUTO: ABNORMAL
LYMPHOCYTES # BLD AUTO: 0.7 10*3/MM3 (ref 0.7–3.1)
LYMPHOCYTES NFR BLD AUTO: 6.6 % (ref 19.6–45.3)
MAGNESIUM SERPL-MCNC: 1.7 MG/DL (ref 1.6–2.4)
MCH RBC QN AUTO: 31.7 PG (ref 26.6–33)
MCHC RBC AUTO-ENTMCNC: 32.9 G/DL (ref 31.5–35.7)
MCV RBC AUTO: 96.3 FL (ref 79–97)
MONOCYTES # BLD AUTO: 0.36 10*3/MM3 (ref 0.1–0.9)
MONOCYTES NFR BLD AUTO: 3.4 % (ref 5–12)
NEUTROPHILS NFR BLD AUTO: 88.9 % (ref 42.7–76)
NEUTROPHILS NFR BLD AUTO: 9.39 10*3/MM3 (ref 1.7–7)
NITRITE UR QL STRIP: NEGATIVE
NRBC BLD AUTO-RTO: 0 /100 WBC (ref 0–0.2)
NT-PROBNP SERPL-MCNC: 1056 PG/ML (ref 0–1800)
PH UR STRIP.AUTO: 7.5 [PH] (ref 5–8)
PLATELET # BLD AUTO: 292 10*3/MM3 (ref 140–450)
PMV BLD AUTO: 10 FL (ref 6–12)
POTASSIUM SERPL-SCNC: 3 MMOL/L (ref 3.5–5.2)
PROT SERPL-MCNC: 5.8 G/DL (ref 6–8.5)
PROT UR QL STRIP: NEGATIVE
RBC # BLD AUTO: 3 10*6/MM3 (ref 3.77–5.28)
RBC # UR STRIP: ABNORMAL /HPF
REF LAB TEST METHOD: ABNORMAL
SODIUM SERPL-SCNC: 140 MMOL/L (ref 136–145)
SP GR UR STRIP: 1.03 (ref 1–1.03)
SQUAMOUS #/AREA URNS HPF: ABNORMAL /HPF
TROPONIN T SERPL HS-MCNC: 23 NG/L
UROBILINOGEN UR QL STRIP: ABNORMAL
WBC # UR STRIP: ABNORMAL /HPF
WBC NRBC COR # BLD AUTO: 10.56 10*3/MM3 (ref 3.4–10.8)
WHOLE BLOOD HOLD COAG: NORMAL
WHOLE BLOOD HOLD SPECIMEN: NORMAL
YEAST URNS QL MICRO: ABNORMAL /HPF

## 2024-08-09 PROCEDURE — 25510000001 IOPAMIDOL PER 1 ML: Performed by: EMERGENCY MEDICINE

## 2024-08-09 PROCEDURE — 80053 COMPREHEN METABOLIC PANEL: CPT | Performed by: EMERGENCY MEDICINE

## 2024-08-09 PROCEDURE — 94761 N-INVAS EAR/PLS OXIMETRY MLT: CPT

## 2024-08-09 PROCEDURE — 93005 ELECTROCARDIOGRAM TRACING: CPT | Performed by: EMERGENCY MEDICINE

## 2024-08-09 PROCEDURE — 84484 ASSAY OF TROPONIN QUANT: CPT | Performed by: EMERGENCY MEDICINE

## 2024-08-09 PROCEDURE — 94799 UNLISTED PULMONARY SVC/PX: CPT

## 2024-08-09 PROCEDURE — 71045 X-RAY EXAM CHEST 1 VIEW: CPT

## 2024-08-09 PROCEDURE — 81001 URINALYSIS AUTO W/SCOPE: CPT | Performed by: EMERGENCY MEDICINE

## 2024-08-09 PROCEDURE — 94664 DEMO&/EVAL PT USE INHALER: CPT

## 2024-08-09 PROCEDURE — 93010 ELECTROCARDIOGRAM REPORT: CPT | Performed by: INTERNAL MEDICINE

## 2024-08-09 PROCEDURE — 94640 AIRWAY INHALATION TREATMENT: CPT

## 2024-08-09 PROCEDURE — 83735 ASSAY OF MAGNESIUM: CPT | Performed by: EMERGENCY MEDICINE

## 2024-08-09 PROCEDURE — 25010000002 METHYLPREDNISOLONE PER 40 MG: Performed by: INTERNAL MEDICINE

## 2024-08-09 PROCEDURE — 83880 ASSAY OF NATRIURETIC PEPTIDE: CPT | Performed by: EMERGENCY MEDICINE

## 2024-08-09 PROCEDURE — 85025 COMPLETE CBC W/AUTO DIFF WBC: CPT | Performed by: EMERGENCY MEDICINE

## 2024-08-09 PROCEDURE — 99291 CRITICAL CARE FIRST HOUR: CPT

## 2024-08-09 PROCEDURE — 71260 CT THORAX DX C+: CPT

## 2024-08-09 RX ORDER — TRAMADOL HYDROCHLORIDE 50 MG/1
50 TABLET ORAL EVERY 6 HOURS PRN
Status: DISCONTINUED | OUTPATIENT
Start: 2024-08-09 | End: 2024-08-11 | Stop reason: HOSPADM

## 2024-08-09 RX ORDER — ALPRAZOLAM 0.25 MG/1
0.25 TABLET ORAL EVERY 6 HOURS PRN
Status: DISCONTINUED | OUTPATIENT
Start: 2024-08-09 | End: 2024-08-11 | Stop reason: HOSPADM

## 2024-08-09 RX ORDER — IPRATROPIUM BROMIDE AND ALBUTEROL SULFATE 2.5; .5 MG/3ML; MG/3ML
3 SOLUTION RESPIRATORY (INHALATION)
Status: DISCONTINUED | OUTPATIENT
Start: 2024-08-09 | End: 2024-08-11 | Stop reason: HOSPADM

## 2024-08-09 RX ORDER — AMLODIPINE BESYLATE 5 MG/1
5 TABLET ORAL
Status: DISCONTINUED | OUTPATIENT
Start: 2024-08-09 | End: 2024-08-11 | Stop reason: HOSPADM

## 2024-08-09 RX ORDER — NITROGLYCERIN 0.4 MG/1
0.4 TABLET SUBLINGUAL
Status: DISCONTINUED | OUTPATIENT
Start: 2024-08-09 | End: 2024-08-11 | Stop reason: HOSPADM

## 2024-08-09 RX ORDER — SODIUM CHLORIDE 0.9 % (FLUSH) 0.9 %
10 SYRINGE (ML) INJECTION EVERY 12 HOURS SCHEDULED
Status: DISCONTINUED | OUTPATIENT
Start: 2024-08-09 | End: 2024-08-11 | Stop reason: HOSPADM

## 2024-08-09 RX ORDER — NICOTINE 21 MG/24HR
1 PATCH, TRANSDERMAL 24 HOURS TRANSDERMAL
Status: DISCONTINUED | OUTPATIENT
Start: 2024-08-09 | End: 2024-08-11 | Stop reason: HOSPADM

## 2024-08-09 RX ORDER — SODIUM CHLORIDE 0.9 % (FLUSH) 0.9 %
10 SYRINGE (ML) INJECTION AS NEEDED
Status: DISCONTINUED | OUTPATIENT
Start: 2024-08-09 | End: 2024-08-11 | Stop reason: HOSPADM

## 2024-08-09 RX ORDER — ACETAMINOPHEN 650 MG/1
650 SUPPOSITORY RECTAL EVERY 4 HOURS PRN
Status: DISCONTINUED | OUTPATIENT
Start: 2024-08-09 | End: 2024-08-11 | Stop reason: HOSPADM

## 2024-08-09 RX ORDER — ALUMINA, MAGNESIA, AND SIMETHICONE 2400; 2400; 240 MG/30ML; MG/30ML; MG/30ML
15 SUSPENSION ORAL EVERY 6 HOURS PRN
Status: DISCONTINUED | OUTPATIENT
Start: 2024-08-09 | End: 2024-08-11 | Stop reason: HOSPADM

## 2024-08-09 RX ORDER — BISACODYL 10 MG
10 SUPPOSITORY, RECTAL RECTAL DAILY PRN
Status: DISCONTINUED | OUTPATIENT
Start: 2024-08-09 | End: 2024-08-11 | Stop reason: HOSPADM

## 2024-08-09 RX ORDER — ONDANSETRON 2 MG/ML
4 INJECTION INTRAMUSCULAR; INTRAVENOUS EVERY 4 HOURS PRN
Status: DISCONTINUED | OUTPATIENT
Start: 2024-08-09 | End: 2024-08-11 | Stop reason: HOSPADM

## 2024-08-09 RX ORDER — SERTRALINE HYDROCHLORIDE 25 MG/1
25 TABLET, FILM COATED ORAL DAILY
Status: DISCONTINUED | OUTPATIENT
Start: 2024-08-09 | End: 2024-08-11 | Stop reason: HOSPADM

## 2024-08-09 RX ORDER — AMOXICILLIN 250 MG
2 CAPSULE ORAL 2 TIMES DAILY PRN
Status: DISCONTINUED | OUTPATIENT
Start: 2024-08-09 | End: 2024-08-11 | Stop reason: HOSPADM

## 2024-08-09 RX ORDER — POTASSIUM CHLORIDE 20 MEQ/1
40 TABLET, EXTENDED RELEASE ORAL EVERY 4 HOURS
Status: COMPLETED | OUTPATIENT
Start: 2024-08-09 | End: 2024-08-09

## 2024-08-09 RX ORDER — IPRATROPIUM BROMIDE AND ALBUTEROL SULFATE 2.5; .5 MG/3ML; MG/3ML
3 SOLUTION RESPIRATORY (INHALATION) ONCE
Status: COMPLETED | OUTPATIENT
Start: 2024-08-09 | End: 2024-08-09

## 2024-08-09 RX ORDER — BISACODYL 5 MG/1
5 TABLET, DELAYED RELEASE ORAL DAILY PRN
Status: DISCONTINUED | OUTPATIENT
Start: 2024-08-09 | End: 2024-08-11 | Stop reason: HOSPADM

## 2024-08-09 RX ORDER — ACETAMINOPHEN 325 MG/1
650 TABLET ORAL EVERY 4 HOURS PRN
Status: DISCONTINUED | OUTPATIENT
Start: 2024-08-09 | End: 2024-08-11 | Stop reason: HOSPADM

## 2024-08-09 RX ORDER — ENOXAPARIN SODIUM 100 MG/ML
1 INJECTION SUBCUTANEOUS ONCE
Status: DISCONTINUED | OUTPATIENT
Start: 2024-08-09 | End: 2024-08-09

## 2024-08-09 RX ORDER — PRAVASTATIN SODIUM 20 MG
40 TABLET ORAL DAILY
Status: DISCONTINUED | OUTPATIENT
Start: 2024-08-09 | End: 2024-08-11 | Stop reason: HOSPADM

## 2024-08-09 RX ORDER — ACETAMINOPHEN 160 MG/5ML
650 SOLUTION ORAL EVERY 4 HOURS PRN
Status: DISCONTINUED | OUTPATIENT
Start: 2024-08-09 | End: 2024-08-11 | Stop reason: HOSPADM

## 2024-08-09 RX ORDER — POLYETHYLENE GLYCOL 3350 17 G/17G
17 POWDER, FOR SOLUTION ORAL DAILY PRN
Status: DISCONTINUED | OUTPATIENT
Start: 2024-08-09 | End: 2024-08-11 | Stop reason: HOSPADM

## 2024-08-09 RX ORDER — SODIUM CHLORIDE 9 MG/ML
40 INJECTION, SOLUTION INTRAVENOUS AS NEEDED
Status: DISCONTINUED | OUTPATIENT
Start: 2024-08-09 | End: 2024-08-11 | Stop reason: HOSPADM

## 2024-08-09 RX ORDER — TEMAZEPAM 15 MG/1
15 CAPSULE ORAL NIGHTLY PRN
Status: DISCONTINUED | OUTPATIENT
Start: 2024-08-09 | End: 2024-08-11 | Stop reason: HOSPADM

## 2024-08-09 RX ORDER — METOPROLOL SUCCINATE 25 MG/1
12.5 TABLET, EXTENDED RELEASE ORAL
Status: DISCONTINUED | OUTPATIENT
Start: 2024-08-09 | End: 2024-08-11 | Stop reason: HOSPADM

## 2024-08-09 RX ORDER — FAMOTIDINE 20 MG/1
40 TABLET, FILM COATED ORAL DAILY
Status: DISCONTINUED | OUTPATIENT
Start: 2024-08-09 | End: 2024-08-11 | Stop reason: HOSPADM

## 2024-08-09 RX ADMIN — PRAVASTATIN SODIUM 40 MG: 20 TABLET ORAL at 14:54

## 2024-08-09 RX ADMIN — IPRATROPIUM BROMIDE AND ALBUTEROL SULFATE 3 ML: .5; 3 SOLUTION RESPIRATORY (INHALATION) at 18:58

## 2024-08-09 RX ADMIN — IPRATROPIUM BROMIDE AND ALBUTEROL SULFATE 3 ML: .5; 3 SOLUTION RESPIRATORY (INHALATION) at 11:20

## 2024-08-09 RX ADMIN — APIXABAN 10 MG: 5 TABLET, FILM COATED ORAL at 20:10

## 2024-08-09 RX ADMIN — METHYLPREDNISOLONE SODIUM SUCCINATE 40 MG: 40 INJECTION INTRAMUSCULAR; INTRAVENOUS at 14:55

## 2024-08-09 RX ADMIN — SENNOSIDES AND DOCUSATE SODIUM 2 TABLET: 50; 8.6 TABLET ORAL at 23:20

## 2024-08-09 RX ADMIN — SERTRALINE 25 MG: 25 TABLET, FILM COATED ORAL at 14:54

## 2024-08-09 RX ADMIN — Medication 10 ML: at 20:11

## 2024-08-09 RX ADMIN — FAMOTIDINE 40 MG: 20 TABLET ORAL at 14:53

## 2024-08-09 RX ADMIN — METOPROLOL SUCCINATE 12.5 MG: 25 TABLET, EXTENDED RELEASE ORAL at 14:53

## 2024-08-09 RX ADMIN — IPRATROPIUM BROMIDE AND ALBUTEROL SULFATE 3 ML: .5; 3 SOLUTION RESPIRATORY (INHALATION) at 15:26

## 2024-08-09 RX ADMIN — AMLODIPINE BESYLATE 5 MG: 5 TABLET ORAL at 14:54

## 2024-08-09 RX ADMIN — POTASSIUM CHLORIDE 40 MEQ: 1500 TABLET, EXTENDED RELEASE ORAL at 15:03

## 2024-08-09 RX ADMIN — NICOTINE 1 PATCH: 21 PATCH, EXTENDED RELEASE TRANSDERMAL at 14:54

## 2024-08-09 RX ADMIN — POTASSIUM CHLORIDE 40 MEQ: 1500 TABLET, EXTENDED RELEASE ORAL at 23:20

## 2024-08-09 RX ADMIN — IOPAMIDOL 100 ML: 755 INJECTION, SOLUTION INTRAVENOUS at 11:53

## 2024-08-09 RX ADMIN — POTASSIUM CHLORIDE 40 MEQ: 1500 TABLET, EXTENDED RELEASE ORAL at 20:10

## 2024-08-09 RX ADMIN — METHYLPREDNISOLONE SODIUM SUCCINATE 40 MG: 40 INJECTION INTRAMUSCULAR; INTRAVENOUS at 23:20

## 2024-08-09 RX ADMIN — IPRATROPIUM BROMIDE AND ALBUTEROL SULFATE 3 ML: .5; 3 SOLUTION RESPIRATORY (INHALATION) at 23:31

## 2024-08-09 RX ADMIN — APIXABAN 10 MG: 5 TABLET, FILM COATED ORAL at 14:54

## 2024-08-09 NOTE — ED PROVIDER NOTES
Time: 10:45 AM EDT  Date of encounter:  8/9/2024  Independent Historian/Clinical History and Information was obtained by:   Patient and Family    History is limited by: N/A    Chief Complaint: Shortness of breath      History of Present Illness:  Patient is a 84 y.o. year old female who presents to the emergency department for evaluation of shortness of breath.  Patient with a history of hypertension, hyperlipidemia, recent hip fracture, COPD who presents with complaints of low oxygen.  Was at rehab today and was noted to have oxygen saturations in the 70s.  She denies any significant shortness of breath.  States that she just got out of the hospital for a hip fracture.  Family at bedside states that she was on and off oxygen throughout her stay in the hospital.  She was given steroids and a DuoNeb in route.  She states that she feels okay at this time.  No other complaints this time.      Patient Care Team  Primary Care Provider: Santo Carter MD    Past Medical History:     No Known Allergies  Past Medical History:   Diagnosis Date    Anxiety     Hyperlipidemia     Hypertension      Past Surgical History:   Procedure Laterality Date    HIP INTERTROCHANTERIC NAILING Left 7/30/2024    Procedure: HIP INTERTROCHANTERIC NAILING;  Surgeon: Dutch Farmer MD;  Location: [facility];  Service: Orthopedics;  Laterality: Left;    HYSTERECTOMY       History reviewed. No pertinent family history.    Home Medications:  Prior to Admission medications    Medication Sig Start Date End Date Taking? Authorizing Provider   albuterol sulfate  (90 Base) MCG/ACT inhaler Inhale 2 puffs Every 4 (Four) Hours As Needed for Wheezing or Shortness of Air for up to 30 days. 8/5/24 9/4/24  Santo Carter MD   amLODIPine (NORVASC) 5 MG tablet Take 1 tablet by mouth Daily for 30 days. 8/5/24 9/4/24  Santo Carter MD   aspirin 325 MG EC tablet Take 1 tablet by mouth Daily for 30 days. 8/5/24 9/4/24  Santo Carter MD  "  Budeson-Glycopyrrol-Formoterol (Breztri Aerosphere) 160-9-4.8 MCG/ACT aerosol inhaler Inhale 2 puffs 2 (Two) Times a Day for 30 days. 8/5/24 9/4/24  Santo Carter MD   metoprolol succinate XL (TOPROL-XL) 25 MG 24 hr tablet Take 0.5 tablets by mouth Daily for 30 days. 8/6/24 9/5/24  Santo Carter MD   nicotine (NICODERM CQ) 21 MG/24HR patch Place 1 patch on the skin as directed by provider Daily for 30 days. 8/5/24 9/4/24  Santo Carter MD   pravastatin (PRAVACHOL) 40 MG tablet Take 1 tablet by mouth Daily. 8/5/24 8/6/25  Santo Carter MD   sennosides-docusate (PERICOLACE) 8.6-50 MG per tablet Take 2 tablets by mouth Every 12 (Twelve) Hours As Needed for Constipation for up to 30 days. 8/5/24 9/4/24  Santo Carter MD   sertraline (ZOLOFT) 25 MG tablet Take 1 tablet by mouth Daily. 8/5/24 8/6/25  Santo Carter MD   traMADol (ULTRAM) 50 MG tablet Take 1 tablet by mouth Every 6 (Six) Hours As Needed for Moderate Pain for up to 30 days. 8/5/24 9/4/24  Santo Carter MD        Social History:   Social History     Tobacco Use    Smoking status: Every Day     Current packs/day: 0.50     Types: Cigarettes    Smokeless tobacco: Never   Vaping Use    Vaping status: Never Used   Substance Use Topics    Alcohol use: Never    Drug use: Never         Review of Systems:  Review of Systems   Respiratory:          Hypoxia        Physical Exam:  /71 (Patient Position: Lying)   Pulse 82   Temp 98.6 °F (37 °C)   Resp 20   Ht 152.4 cm (60\")   Wt 47 kg (103 lb 9.9 oz)   SpO2 92%   BMI 20.24 kg/m²     Physical Exam  Vitals and nursing note reviewed.   Constitutional:       Appearance: Normal appearance.   HENT:      Head: Normocephalic and atraumatic.   Eyes:      General: No scleral icterus.  Cardiovascular:      Rate and Rhythm: Normal rate and regular rhythm.      Heart sounds: Normal heart sounds.   Pulmonary:      Effort: Pulmonary effort is normal.      Breath sounds: Wheezing present.   Abdominal:      Palpations: Abdomen is " soft.      Tenderness: There is no abdominal tenderness.   Musculoskeletal:         General: Normal range of motion.      Cervical back: Normal range of motion.   Skin:     Findings: No rash.   Neurological:      General: No focal deficit present.      Mental Status: She is alert.                  Procedures:  Procedures      Medical Decision Making:      Comorbidities that affect care:    COPD, Hypertension, Smoking    External Notes reviewed:    Reviewed admission from 7/29/2024      The following orders were placed and all results were independently analyzed by me:  Orders Placed This Encounter   Procedures    XR Chest 1 View    CT Chest With Contrast Diagnostic    [place] Draw    Comprehensive Metabolic Panel    BNP    Single High Sensitivity Troponin T    CBC Auto Differential    Magnesium    NPO Diet NPO Type: Strict NPO    Undress & Gown    Continuous Pulse Oximetry    Vital Signs    IP General Consult (Use specialty-specific consult if known)    Oxygen Therapy- Nasal Cannula; Titrate 1-6 LPM Per SpO2; 90 - 95%    ECG 12 Lead ED Triage Standing Order; SOA    Insert Peripheral IV    CBC & Differential    Green Top (Gel)    Lavender Top    Gold Top - SST    Light Blue Top       Medications Given in the Emergency Department:  Medications   sodium chloride 0.9 % flush 10 mL (has no administration in time range)   Enoxaparin Sodium (LOVENOX) syringe 50 mg (has no administration in time range)   ipratropium-albuterol (DUO-NEB) nebulizer solution 3 mL (3 mL Nebulization Given 8/9/24 1120)   iopamidol (ISOVUE-370) 76 % injection 100 mL (100 mL Intravenous Given 8/9/24 1153)        ED Course:    ED Course as of 08/09/24 1241   Fri Aug 09, 2024   1240 Spoke with Dr. Carter who agrees to admit [MA]      ED Course User Index  [MA] Lyle Sierra MD       Labs:    Lab Results (last 24 hours)       Procedure Component Value Units Date/Time    CBC & Differential [[id number]]  (Abnormal) Collected: 08/09/24 1048     Specimen: Blood Updated: 08/09/24 1055    Narrative:      The following orders were created for panel order CBC & Differential.  Procedure                               Abnormality         Status                     ---------                               -----------         ------                     CBC Auto Differential[[id number]]        Abnormal            Final result                 Please view results for these tests on the individual orders.    Comprehensive Metabolic Panel [[id number]]  (Abnormal) Collected: 08/09/24 1048    Specimen: Blood Updated: 08/09/24 1121     Glucose 113 mg/dL      BUN 17 mg/dL      Creatinine 0.54 mg/dL      Sodium 140 mmol/L      Potassium 3.0 mmol/L      Chloride 100 mmol/L      CO2 28.4 mmol/L      Calcium 9.3 mg/dL      Total Protein 5.8 g/dL      Albumin 3.4 g/dL      ALT (SGPT) 10 U/L      AST (SGOT) 16 U/L      Alkaline Phosphatase 119 U/L      Total Bilirubin 1.3 mg/dL      Globulin 2.4 gm/dL      A/G Ratio 1.4 g/dL      BUN/Creatinine Ratio 31.5     Anion Gap 11.6 mmol/L      eGFR 90.9 mL/min/1.73     Narrative:      GFR Normal >60  Chronic Kidney Disease <60  Kidney Failure <15    The GFR formula is only valid for adults with stable renal function between ages 18 and 70.    BNP [[id number]]  (Normal) Collected: 08/09/24 1048    Specimen: Blood Updated: 08/09/24 1117     proBNP 1,056.0 pg/mL     Narrative:      This assay is used as an aid in the diagnosis of individuals suspected of having heart failure. It can be used as an aid in the diagnosis of acute decompensated heart failure (ADHF) in patients presenting with signs and symptoms of ADHF to the emergency department (ED). In addition, NT-proBNP of <300 pg/mL indicates ADHF is not likely.    Age Range Result Interpretation  NT-proBNP Concentration (pg/mL:      <50             Positive            >450                   Gray                 300-450                    Negative             <300    50-75           Positive             >900                  Gray                300-900                  Negative            <300      >75             Positive            >1800                  Gray                300-1800                  Negative            <300    Single High Sensitivity Troponin T [[id number]]  (Abnormal) Collected: 08/09/24 1048    Specimen: Blood Updated: 08/09/24 1121     HS Troponin T 23 ng/L     Narrative:      High Sensitive Troponin T Reference Range:  <14.0 ng/L- Negative Female for AMI  <22.0 ng/L- Negative Male for AMI  >=14 - Abnormal Female indicating possible myocardial injury.  >=22 - Abnormal Male indicating possible myocardial injury.   Clinicians would have to utilize clinical acumen, EKG, Troponin, and serial changes to determine if it is an Acute Myocardial Infarction or myocardial injury due to an underlying chronic condition.         CBC Auto Differential [[id number]]  (Abnormal) Collected: 08/09/24 1048    Specimen: Blood Updated: 08/09/24 1055     WBC 10.56 10*3/mm3      RBC 3.00 10*6/mm3      Hemoglobin 9.5 g/dL      Hematocrit 28.9 %      MCV 96.3 fL      MCH 31.7 pg      MCHC 32.9 g/dL      RDW 15.4 %      RDW-SD 52.4 fl      MPV 10.0 fL      Platelets 292 10*3/mm3      Neutrophil % 88.9 %      Lymphocyte % 6.6 %      Monocyte % 3.4 %      Eosinophil % 0.1 %      Basophil % 0.2 %      Immature Grans % 0.8 %      Neutrophils, Absolute 9.39 10*3/mm3      Lymphocytes, Absolute 0.70 10*3/mm3      Monocytes, Absolute 0.36 10*3/mm3      Eosinophils, Absolute 0.01 10*3/mm3      Basophils, Absolute 0.02 10*3/mm3      Immature Grans, Absolute 0.08 10*3/mm3      nRBC 0.0 /100 WBC     Magnesium [[id number]]  (Normal) Collected: 08/09/24 1048    Specimen: Blood Updated: 08/09/24 1225     Magnesium 1.7 mg/dL              Imaging:    CT Chest With Contrast Diagnostic    Result Date: 8/9/2024  CT CHEST W CONTRAST DIAGNOSTIC Date of Exam: 8/9/2024 11:34 AM EDT Indication: soa shortness of breath. Comparison: Chest  x-ray 8/9/2024 Technique: Axial CT images were obtained of the chest after the uneventful intravenous administration of iodinated contrast.  Reconstructed coronal and sagittal images were also obtained. Automated exposure control and iterative construction methods were  used. Findings: PULMONARY VASCULATURE: There is a filling defect compatible with embolus in a subsegmental branch of a right middle lobe pulmonary artery (images 162 through 167 of series 401).Main pulmonary artery is normal in size. No evidence of right heart strain. MEDIASTINUM:Unremarkable. Aortic and heart size are normal. No aortic dissection identified. No mass nor pericardial effusion. CORONARY ARTERIES: Moderate calcified atherosclerotic disease. LUNGS: Evaluation of lung parenchyma demonstrates an irregular nodule in the posterior left upper lung (image 15 of series 404 measuring 0.8 x 1.2 cm suspicious in appearance with micro spiculations. There is bibasilar airspace disease left greater than right with mild right and moderate left pleural effusion. Findings are superimposed on a background of centrilobular emphysema. PLEURAL SPACE: No evidence for pneumothorax. LYMPH NODES: There are no pathologically enlarged lymph nodes. UPPER ABDOMEN: There is nodular hyperplasia of the left adrenal gland. OSSEOUS STRUCTURES: There is a compression deformity along the superior endplate of T11 which does not appear acute on current exam.     Impression: 1. Findings compatible with pulmonary embolus in a right middle lobe subsegmental artery. 2. Irregular spiculated nodule in the left upper lung measuring up to 1.2 cm in size. This is suspicious for neoplasm. PET/CT or biopsy recommended. 3. Bibasilar airspace disease and pleural effusions. Findings were called to Dr. Sierra by myself at 12:16 p.m. Electronically Signed: Melani Kwok MD  8/9/2024 12:16 PM EDT  Workstation ID: [id number]    XR Chest 1 View    Result Date: 8/9/2024  XR CHEST 1 VW Date  of Exam: 8/9/2024 10:16 AM EDT Indication: SOA Triage Protocol Comparison: Chest AP dated 7/29/2024 Findings: There are suspected fractures of the lateral left sixth and seventh ribs. Alignment is near-anatomic. There is a small left pleural effusion. Left basilar airspace opacity is noted. No pneumothorax is seen. Mild right basilar airspace opacity is noted. The cardiac and mediastinal silhouettes appear normal.     Impression: 1.Fractures of the lateral left sixth and seventh ribs in near-anatomic alignment. 2.Small left pleural effusion, new in the interval. 3.Bibasilar airspace opacity suspected to represent atelectasis. Pneumonia, pulmonary edema and aspiration are also in the differential. Electronically Signed: Eliezer Barrera MD  8/9/2024 10:39 AM EDT  Workstation ID: [id number]       Differential Diagnosis and Discussion:    Dyspnea: Differential diagnosis includes but is not limited to metabolic acidosis, neurological disorders, psychogenic, asthma, pneumothorax, upper airway obstruction, COPD, pneumonia, noncardiogenic pulmonary edema, interstitial lung disease, anemia, congestive heart failure, and pulmonary embolism    All labs were reviewed and interpreted by me.  All X-rays impressions were independently interpreted by me.  EKG was interpreted by me.  CT scan radiology impression was interpreted by me.    MDM       Patient 84-year-old female who presents with complaints of shortness of breath.  Has a history of COPD and was at rehab sent over here for low oxygen saturations.  Found to have what appears to be a small pulmonary emboli as well as significant COPD.  She also has some pleural effusions as well as a spiculated mass concerning for possible cancer.  Will give a dose of Lovenox for the PE.  She has had significant improvement on her oxygen here and appears to be improving.  Will need admission for further workup management.  This likely appears to be more COPD related from the hypoxia  standpoint.    Critical Care Note: Total Critical Care time of 33 minutes. Total critical care time documented does not include time spent on separately billed procedures for services of nurses or physician assistants. I personally saw and examined the patient. I have reviewed all diagnostic interpretations and treatment plans as written. I was present for the key portions of any procedures performed and the inclusive time noted in any critical care statement. Critical care time includes patient management by me, time spent at the patients bedside,  time to review lab and imaging results, discussing patient care, documentation in the medical record, and time spent with family or caregiver.        Patient Care Considerations:          Consultants/Shared Management Plan:    Hospitalist: I have discussed the case with Dr. Carter who agrees to accept the patient for admission.    Social Determinants of Health:          Disposition and Care Coordination:    Admit:   Through independent evaluation of the patient's history, physical, and imperical data, the patient meets criteria for inpatient admission to the hospital.        Final diagnoses:   Acute respiratory failure with hypoxia   Other acute pulmonary embolism without acute cor pulmonale   Pleural effusion        ED Disposition       ED Disposition   Decision to Admit    Condition   --    Comment   --               This medical record created using voice recognition software.             Lyle Sierra MD  08/09/24 [id number]

## 2024-08-09 NOTE — OUTREACH NOTE
General Surgery Week 1 Survey      Flowsheet Row Responses   [facility] facility patient discharged from? [facility]   Does the patient have one of the following disease processes/diagnoses(primary or secondary)? General Surgery   Week 1 attempt successful? No   Unsuccessful attempts Attempt 1  [Currently in ER]            AG MANZANO - Registered Nurse

## 2024-08-09 NOTE — PAYOR COMM NOTE
"Danielle Granda (84 y.o. Female)     PATIENT INFORMATION  Name:  Danielle Granda  MRN#:     [id number]  :  1940         ADMISSION INFORMATION  CLASS: Inpatient   IP DOS: 24        CURRENT ATTENDING PROVIDER INFORMATION  Name/NPI: Santo Carter MD [[id number]]  Phone:  Phone: [phone]  Fax:  [phone]        REQUESTING PROVIDER and RENDERING FACILITY  Name:  [facility]   NPI:  [id number]  TID:  [id number]  Address:      [address] [place], [place] [address] [address]  Phone:               [phone]  Fax:  [phone]        UTILIZATION REVIEW CONTACT INFORMATION  Phone:      [phone]  Fax:           [phone]        ADMISSION DIAGNOSIS  Pleural effusion [J90]  COPD exacerbation [J44.1]  Acute respiratory failure with hypoxia [J96.01]  Other acute pulmonary embolism without acute cor pulmonale [I26.99]      ++++++++++++++++++++++++++++++++++++++++++++++++++++++++++++++++++++++++++++++++        Date of Birth   1940    Social Security Number       Address   [address] [place], [place] [address]    Home Phone   [phone]    MRN   [id number]       Quaker   Non-Advent    Marital Status   Single                            Admission Date   24    Admission Type   Emergency    Admitting Provider   Santo Carter MD    Attending Provider   Santo Carter MD    Department, Room/Bed   [facility] 5TH FLOOR SURGICAL TELEMETRY UNIT,        Discharge Date       Discharge Disposition       Discharge Destination                                 Attending Provider: Santo Carter MD    Allergies: No Known Allergies    Isolation: None   Infection: None   Code Status: CPR    Ht: 152.4 cm (60\")   Wt: 47 kg (103 lb 9.9 oz)    Admission Cmt: None   Principal Problem: COPD exacerbation [J44.1]                   Active Insurance as of 2024       Primary Coverage       Payor Plan Insurance Group Employer/Plan Group    [employer] [place] MEDICARE " REPLACEMENT WELLCARE MED ADV HMO NGN       Payor Plan Address Payor Plan Phone Number Payor Plan Fax Number Effective Dates    [address] [address]   7/1/2024 - None Entered    [facility] [phone]         Subscriber Name Subscriber Birth Date Member ID       LOS THAO 1940 [id number]                        Pulmonary Embolism [facility] Inpatient Care       Indications Met   Last updated by Cheyenne Grimm RN on 8/9/2024 1450     Review Status Created By   Primary Completed Cheyenne Grimm RN      Criteria Review   Pulmonary Embolism [facility] Inpatient Care     Overall Determination: Indications Met     Criteria:  [×] Admission is indicated for  1 or more  of the following :      [×] Hypoxemia          8/9/2024  2:51 PM              -- 8/9/2024  2:51 PM by Cheyenne Grimm RN --                                    (X) Hypoxemia, as indicated by  1 or more  of the following  (1):                  (X) Patient without baseline need for supplemental oxygen with oxygen saturation (SaO2) of less than 90% or arterial blood gas partial pressure of oxygen (PO2) of less than 60 mm Hg (8.0 kPa) on room air [A] [B]                  (X) Patient without baseline need for supplemental oxygen who now requires supplemental oxygen to keep SaO2 greater than 89% or PO2 greater than 59 mm Hg (7.9 kPa) [A]          8/9/2024  2:51 PM              -- 8/9/2024  2:51 PM by Cheyenne Grimm RN --                  O2 sats documented by outpatient OT: Sats 71%. EMS called. Requiring O2 4-8L to maintain sats greater than 90%      [×] Chronic cardiopulmonary disease (eg, heart failure, coronary artery disease, COPD, asthma, cor pulmonale) that is clinically significant (eg, symptomatic, unstable)          8/9/2024  2:51 PM              -- 8/9/2024  2:51 PM by Cheyenne Grimm RN --                  O2 sats in 70s, requiring O2. Per CT:  pulmonary emboli as well as significant COPD.  She also has some pleural effusions as well as a spiculated mass concerning for  possible cancer.      [×] Acutely elevated cardiac biomarker (eg, troponin, BNP, or N-terminal pro-BNP) [E]          8/9/2024  2:51 PM              -- 8/9/2024  2:51 PM by Cheyenne Grimm RN --                  Troponin 23     Notes:  -- 8/9/2024  2:51 PM by Cheyenne Grimm, DENY --      Admitted for COPDE with sats in 70s, small PE & possible neoplasm.                   To ED via EMS for severe SOA, hypoxemia. Is s/p hip fx. Was getting OT today when sats dropped to low 70s (71%). Lungs w/wheezing. AAOx3.                   PMHx: HTN; HLD, COPD, Recent hip fx (gets outpt PT/OT). 7/30/24: ORIF L hip.                   ED results:                   CT chest: Findings compatible with pulmonary embolus in a right middle lobe subsegmental artery.      2. Irregular spiculated nodule in the left upper lung measuring up to 1.2 cm in size. This is suspicious for neoplasm. PET/CT or biopsy recommended.      3. Bibasilar airspace disease and pleural effusions.                  Troponin 23      K+ 3.0      H/H 9.5/28      UA: 1+ ketones, 2+ leukocytes                                                      Per EMS:      Duoneb. O2 9L. IV soluMedrol                  In ED:      O2 3-4L      Duoneb                  Admit:       Telemetry      O2      Eliquis PO bid      Duonebs q4h      SoluMedrol IV q8h      Zofran IV prn                        Pulmonary Embolism [facility] Inpatient Care       Indications Met   Last updated by Cheyenne Grimm RN on 8/9/2024 4704     Review Status Created By   Primary Completed Cheyenne Grimm, DENY      Criteria Review   Pulmonary Embolism [facility] Inpatient Care     Overall Determination: Indications Met     Criteria:  [×] Admission is indicated for  1 or more  of the following :      [×] Hypoxemia          8/9/2024  2:51 PM              -- 8/9/2024  2:51 PM by Cheyenne Grimm, DENY --                                    (X) Hypoxemia, as indicated by  1 or more  of the following  (1):                  (X) Patient without  baseline need for supplemental oxygen with oxygen saturation (SaO2) of less than 90% or arterial blood gas partial pressure of oxygen (PO2) of less than 60 mm Hg (8.0 kPa) on room air [A] [B]                  (X) Patient without baseline need for supplemental oxygen who now requires supplemental oxygen to keep SaO2 greater than 89% or PO2 greater than 59 mm Hg (7.9 kPa) [A]          8/9/2024  2:51 PM              -- 8/9/2024  2:51 PM by Cheyenne Grimm RN --                  O2 sats documented by outpatient OT: Sats 71%. EMS called. Requiring O2 4-8L to maintain sats greater than 90%      [×] Chronic cardiopulmonary disease (eg, heart failure, coronary artery disease, COPD, asthma, cor pulmonale) that is clinically significant (eg, symptomatic, unstable)          8/9/2024  2:51 PM              -- 8/9/2024  2:51 PM by Cheyenne Grimm RN --                  O2 sats in 70s, requiring O2. Per CT:  pulmonary emboli as well as significant COPD.  She also has some pleural effusions as well as a spiculated mass concerning for possible cancer.      [×] Acutely elevated cardiac biomarker (eg, troponin, BNP, or N-terminal pro-BNP) [E]          8/9/2024  2:51 PM              -- 8/9/2024  2:51 PM by Cheyenne Grimm RN --                  Troponin 23     Notes:  -- 8/9/2024  2:51 PM by Cheyenne Grimm RN --      Admitted for COPDE with sats in 70s, small PE & possible neoplasm.                   To ED via EMS for severe SOA, hypoxemia. Is s/p hip fx. Was getting OT today when sats dropped to low 70s (71%). Lungs w/wheezing. AAOx3.                   PMHx: HTN; HLD, COPD, Recent hip fx (gets outpt PT/OT). 7/30/24: ORIF L hip.                   ED results:                   CT chest: Findings compatible with pulmonary embolus in a right middle lobe subsegmental artery.      2. Irregular spiculated nodule in the left upper lung measuring up to 1.2 cm in size. This is suspicious for neoplasm. PET/CT or biopsy recommended.      3.  Bibasilar airspace disease and pleural effusions.                  Troponin 23      K+ 3.0      H/H 9.5/28      UA: 1+ ketones, 2+ leukocytes                                                      Per EMS:      Duoneb. O2 9L. IV soluMedrol                  In ED:      O2 3-4L      Duoneb                  Admit:       Telemetry      O2      Eliquis PO bid      Duonebs q4h      SoluMedrol IV q8h      Zofran IV prn                                   Emergency Department Notes        Lyle Sierra MD at 08/09/24 1045          Time: 10:45 AM EDT  Date of encounter:  8/9/2024  Independent Historian/Clinical History and Information was obtained by:   Patient and Family    History is limited by: N/A    Chief Complaint: Shortness of breath      History of Present Illness:  Patient is a 84 y.o. year old female who presents to the emergency department for evaluation of shortness of breath.  Patient with a history of hypertension, hyperlipidemia, recent hip fracture, COPD who presents with complaints of low oxygen.  Was at rehab today and was noted to have oxygen saturations in the 70s.  She denies any significant shortness of breath.  States that she just got out of the hospital for a hip fracture.  Family at bedside states that she was on and off oxygen throughout her stay in the hospital.  She was given steroids and a DuoNeb in route.  She states that she feels okay at this time.  No other complaints this time.      Patient Care Team  Primary Care Provider: Santo Carter MD    Past Medical History:     No Known Allergies  Past Medical History:   Diagnosis Date    Anxiety     Hyperlipidemia     Hypertension      Past Surgical History:   Procedure Laterality Date    HIP INTERTROCHANTERIC NAILING Left 7/30/2024    Procedure: HIP INTERTROCHANTERIC NAILING;  Surgeon: Dutch Farmer MD;  Location: [facility] MAIN OR;  Service: Orthopedics;  Laterality: Left;    HYSTERECTOMY       History reviewed. No pertinent family  history.    Home Medications:  Prior to Admission medications    Medication Sig Start Date End Date Taking? Authorizing Provider   albuterol sulfate  (90 Base) MCG/ACT inhaler Inhale 2 puffs Every 4 (Four) Hours As Needed for Wheezing or Shortness of Air for up to 30 days. 8/5/24 9/4/24  Santo Carter MD   amLODIPine (NORVASC) 5 MG tablet Take 1 tablet by mouth Daily for 30 days. 8/5/24 9/4/24  Santo Carter MD   aspirin 325 MG EC tablet Take 1 tablet by mouth Daily for 30 days. 8/5/24 9/4/24  Santo Carter MD   Budeson-Glycopyrrol-Formoterol (Breztri Aerosphere) 160-9-4.8 MCG/ACT aerosol inhaler Inhale 2 puffs 2 (Two) Times a Day for 30 days. 8/5/24 9/4/24  Santo Carter MD   metoprolol succinate XL (TOPROL-XL) 25 MG 24 hr tablet Take 0.5 tablets by mouth Daily for 30 days. 8/6/24 9/5/24  Santo Carter MD   nicotine (NICODERM CQ) 21 MG/24HR patch Place 1 patch on the skin as directed by provider Daily for 30 days. 8/5/24 9/4/24  Santo Carter MD   pravastatin (PRAVACHOL) 40 MG tablet Take 1 tablet by mouth Daily. 8/5/24 8/6/25  Santo Carter MD   sennosides-docusate (PERICOLACE) 8.6-50 MG per tablet Take 2 tablets by mouth Every 12 (Twelve) Hours As Needed for Constipation for up to 30 days. 8/5/24 9/4/24  Santo Carter MD   sertraline (ZOLOFT) 25 MG tablet Take 1 tablet by mouth Daily. 8/5/24 8/6/25  Santo Carter MD   traMADol (ULTRAM) 50 MG tablet Take 1 tablet by mouth Every 6 (Six) Hours As Needed for Moderate Pain for up to 30 days. 8/5/24 9/4/24  Santo Carter MD        Social History:   Social History     Tobacco Use    Smoking status: Every Day     Current packs/day: 0.50     Types: Cigarettes    Smokeless tobacco: Never   Vaping Use    Vaping status: Never Used   Substance Use Topics    Alcohol use: Never    Drug use: Never         Review of Systems:  Review of Systems   Respiratory:          Hypoxia        Physical Exam:  /71 (Patient Position: Lying)   Pulse 82   Temp 98.6 °F (37 °C)   Resp 20    "Ht 152.4 cm (60\")   Wt 47 kg (103 lb 9.9 oz)   SpO2 92%   BMI 20.24 kg/m²     Physical Exam  Vitals and nursing note reviewed.   Constitutional:       Appearance: Normal appearance.   HENT:      Head: Normocephalic and atraumatic.   Eyes:      General: No scleral icterus.  Cardiovascular:      Rate and Rhythm: Normal rate and regular rhythm.      Heart sounds: Normal heart sounds.   Pulmonary:      Effort: Pulmonary effort is normal.      Breath sounds: Wheezing present.   Abdominal:      Palpations: Abdomen is soft.      Tenderness: There is no abdominal tenderness.   Musculoskeletal:         General: Normal range of motion.      Cervical back: Normal range of motion.   Skin:     Findings: No rash.   Neurological:      General: No focal deficit present.      Mental Status: She is alert.                  Procedures:  Procedures      Medical Decision Making:      Comorbidities that affect care:    COPD, Hypertension, Smoking    External Notes reviewed:    Reviewed admission from 7/29/2024      The following orders were placed and all results were independently analyzed by me:  Orders Placed This Encounter   Procedures    XR Chest 1 View    CT Chest With Contrast Diagnostic    [place] Draw    Comprehensive Metabolic Panel    BNP    Single High Sensitivity Troponin T    CBC Auto Differential    Magnesium    NPO Diet NPO Type: Strict NPO    Undress & Gown    Continuous Pulse Oximetry    Vital Signs    IP General Consult (Use specialty-specific consult if known)    Oxygen Therapy- Nasal Cannula; Titrate 1-6 LPM Per SpO2; 90 - 95%    ECG 12 Lead ED Triage Standing Order; SOA    Insert Peripheral IV    CBC & Differential    Green Top (Gel)    Lavender Top    Gold Top - SST    Light Blue Top       Medications Given in the Emergency Department:  Medications   sodium chloride 0.9 % flush 10 mL (has no administration in time range)   Enoxaparin Sodium (LOVENOX) syringe 50 mg (has no administration in time range) "   ipratropium-albuterol (DUO-NEB) nebulizer solution 3 mL (3 mL Nebulization Given 8/9/24 1120)   iopamidol (ISOVUE-370) 76 % injection 100 mL (100 mL Intravenous Given 8/9/24 1153)        ED Course:    ED Course as of 08/09/24 1241   Fri Aug 09, 2024   1240 Spoke with Dr. Carter who agrees to admit [MA]      ED Course User Index  [MA] Lyle Sierra MD       Labs:    Lab Results (last 24 hours)       Procedure Component Value Units Date/Time    CBC & Differential [[id number]]  (Abnormal) Collected: 08/09/24 1048    Specimen: Blood Updated: 08/09/24 1055    Narrative:      The following orders were created for panel order CBC & Differential.  Procedure                               Abnormality         Status                     ---------                               -----------         ------                     CBC Auto Differential[[id number]]        Abnormal            Final result                 Please view results for these tests on the individual orders.    Comprehensive Metabolic Panel [[id number]]  (Abnormal) Collected: 08/09/24 1048    Specimen: Blood Updated: 08/09/24 1121     Glucose 113 mg/dL      BUN 17 mg/dL      Creatinine 0.54 mg/dL      Sodium 140 mmol/L      Potassium 3.0 mmol/L      Chloride 100 mmol/L      CO2 28.4 mmol/L      Calcium 9.3 mg/dL      Total Protein 5.8 g/dL      Albumin 3.4 g/dL      ALT (SGPT) 10 U/L      AST (SGOT) 16 U/L      Alkaline Phosphatase 119 U/L      Total Bilirubin 1.3 mg/dL      Globulin 2.4 gm/dL      A/G Ratio 1.4 g/dL      BUN/Creatinine Ratio 31.5     Anion Gap 11.6 mmol/L      eGFR 90.9 mL/min/1.73     Narrative:      GFR Normal >60  Chronic Kidney Disease <60  Kidney Failure <15    The GFR formula is only valid for adults with stable renal function between ages 18 and 70.    BNP [[id number]]  (Normal) Collected: 08/09/24 1048    Specimen: Blood Updated: 08/09/24 1117     proBNP 1,056.0 pg/mL     Narrative:      This assay is used as an aid in the diagnosis of  individuals suspected of having heart failure. It can be used as an aid in the diagnosis of acute decompensated heart failure (ADHF) in patients presenting with signs and symptoms of ADHF to the emergency department (ED). In addition, NT-proBNP of <300 pg/mL indicates ADHF is not likely.    Age Range Result Interpretation  NT-proBNP Concentration (pg/mL:      <50             Positive            >450                   Gray                 300-450                    Negative             <300    50-75           Positive            >900                  Gray                300-900                  Negative            <300      >75             Positive            >1800                  Gray                300-1800                  Negative            <300    Single High Sensitivity Troponin T [[id number]]  (Abnormal) Collected: 08/09/24 1048    Specimen: Blood Updated: 08/09/24 1121     HS Troponin T 23 ng/L     Narrative:      High Sensitive Troponin T Reference Range:  <14.0 ng/L- Negative Female for AMI  <22.0 ng/L- Negative Male for AMI  >=14 - Abnormal Female indicating possible myocardial injury.  >=22 - Abnormal Male indicating possible myocardial injury.   Clinicians would have to utilize clinical acumen, EKG, Troponin, and serial changes to determine if it is an Acute Myocardial Infarction or myocardial injury due to an underlying chronic condition.         CBC Auto Differential [[id number]]  (Abnormal) Collected: 08/09/24 1048    Specimen: Blood Updated: 08/09/24 1055     WBC 10.56 10*3/mm3      RBC 3.00 10*6/mm3      Hemoglobin 9.5 g/dL      Hematocrit 28.9 %      MCV 96.3 fL      MCH 31.7 pg      MCHC 32.9 g/dL      RDW 15.4 %      RDW-SD 52.4 fl      MPV 10.0 fL      Platelets 292 10*3/mm3      Neutrophil % 88.9 %      Lymphocyte % 6.6 %      Monocyte % 3.4 %      Eosinophil % 0.1 %      Basophil % 0.2 %      Immature Grans % 0.8 %      Neutrophils, Absolute 9.39 10*3/mm3      Lymphocytes, Absolute 0.70  10*3/mm3      Monocytes, Absolute 0.36 10*3/mm3      Eosinophils, Absolute 0.01 10*3/mm3      Basophils, Absolute 0.02 10*3/mm3      Immature Grans, Absolute 0.08 10*3/mm3      nRBC 0.0 /100 WBC     Magnesium [[id number]]  (Normal) Collected: 08/09/24 1048    Specimen: Blood Updated: 08/09/24 1225     Magnesium 1.7 mg/dL              Imaging:    CT Chest With Contrast Diagnostic    Result Date: 8/9/2024  CT CHEST W CONTRAST DIAGNOSTIC Date of Exam: 8/9/2024 11:34 AM EDT Indication: soa shortness of breath. Comparison: Chest x-ray 8/9/2024 Technique: Axial CT images were obtained of the chest after the uneventful intravenous administration of iodinated contrast.  Reconstructed coronal and sagittal images were also obtained. Automated exposure control and iterative construction methods were  used. Findings: PULMONARY VASCULATURE: There is a filling defect compatible with embolus in a subsegmental branch of a right middle lobe pulmonary artery (images 162 through 167 of series 401).Main pulmonary artery is normal in size. No evidence of right heart strain. MEDIASTINUM:Unremarkable. Aortic and heart size are normal. No aortic dissection identified. No mass nor pericardial effusion. CORONARY ARTERIES: Moderate calcified atherosclerotic disease. LUNGS: Evaluation of lung parenchyma demonstrates an irregular nodule in the posterior left upper lung (image 15 of series 404 measuring 0.8 x 1.2 cm suspicious in appearance with micro spiculations. There is bibasilar airspace disease left greater than right with mild right and moderate left pleural effusion. Findings are superimposed on a background of centrilobular emphysema. PLEURAL SPACE: No evidence for pneumothorax. LYMPH NODES: There are no pathologically enlarged lymph nodes. UPPER ABDOMEN: There is nodular hyperplasia of the left adrenal gland. OSSEOUS STRUCTURES: There is a compression deformity along the superior endplate of T11 which does not appear acute on current  exam.     Impression: 1. Findings compatible with pulmonary embolus in a right middle lobe subsegmental artery. 2. Irregular spiculated nodule in the left upper lung measuring up to 1.2 cm in size. This is suspicious for neoplasm. PET/CT or biopsy recommended. 3. Bibasilar airspace disease and pleural effusions. Findings were called to Dr. Sierra by myself at 12:16 p.m. Electronically Signed: Melani Kwok MD  8/9/2024 12:16 PM EDT  Workstation ID: [id number]    XR Chest 1 View    Result Date: 8/9/2024  XR CHEST 1 VW Date of Exam: 8/9/2024 10:16 AM EDT Indication: SOA Triage Protocol Comparison: Chest AP dated 7/29/2024 Findings: There are suspected fractures of the lateral left sixth and seventh ribs. Alignment is near-anatomic. There is a small left pleural effusion. Left basilar airspace opacity is noted. No pneumothorax is seen. Mild right basilar airspace opacity is noted. The cardiac and mediastinal silhouettes appear normal.     Impression: 1.Fractures of the lateral left sixth and seventh ribs in near-anatomic alignment. 2.Small left pleural effusion, new in the interval. 3.Bibasilar airspace opacity suspected to represent atelectasis. Pneumonia, pulmonary edema and aspiration are also in the differential. Electronically Signed: Eliezer Barrera MD  8/9/2024 10:39 AM EDT  Workstation ID: [id number]       Differential Diagnosis and Discussion:    Dyspnea: Differential diagnosis includes but is not limited to metabolic acidosis, neurological disorders, psychogenic, asthma, pneumothorax, upper airway obstruction, COPD, pneumonia, noncardiogenic pulmonary edema, interstitial lung disease, anemia, congestive heart failure, and pulmonary embolism    All labs were reviewed and interpreted by me.  All X-rays impressions were independently interpreted by me.  EKG was interpreted by me.  CT scan radiology impression was interpreted by me.    MDM       Patient 84-year-old female who presents with complaints of shortness  of breath.  Has a history of COPD and was at rehab sent over here for low oxygen saturations.  Found to have what appears to be a small pulmonary emboli as well as significant COPD.  She also has some pleural effusions as well as a spiculated mass concerning for possible cancer.  Will give a dose of Lovenox for the PE.  She has had significant improvement on her oxygen here and appears to be improving.  Will need admission for further workup management.  This likely appears to be more COPD related from the hypoxia standpoint.    Critical Care Note: Total Critical Care time of 33 minutes. Total critical care time documented does not include time spent on separately billed procedures for services of nurses or physician assistants. I personally saw and examined the patient. I have reviewed all diagnostic interpretations and treatment plans as written. I was present for the key portions of any procedures performed and the inclusive time noted in any critical care statement. Critical care time includes patient management by me, time spent at the patients bedside,  time to review lab and imaging results, discussing patient care, documentation in the medical record, and time spent with family or caregiver.        Patient Care Considerations:          Consultants/Shared Management Plan:    Hospitalist: I have discussed the case with Dr. Carter who agrees to accept the patient for admission.    Social Determinants of Health:          Disposition and Care Coordination:    Admit:   Through independent evaluation of the patient's history, physical, and imperical data, the patient meets criteria for inpatient admission to the hospital.        Final diagnoses:   Acute respiratory failure with hypoxia   Other acute pulmonary embolism without acute cor pulmonale   Pleural effusion        ED Disposition       ED Disposition   Decision to Admit    Condition   --    Comment   --               This medical record created using voice  recognition software.             Lyle Sierra MD  08/09/24 1242      Electronically signed by Lyle Sierra MD at 08/09/24 1242       Albertina Tay, RN at 08/09/24 0955          Patient arrived via EMS. Patient came from home. Occupational therapist saw patient O2 states decreasing and called EMS. Patient was sating at 71%. Patient is not c/o SOB. EMS gave Duoneb 8 L patient states went up to 98%. EMS gave 125 solumedrol. Patient on endtidal 2L. Patient has history of COPD    Electronically signed by Albertina Tay, RN at 08/09/24 1000       Facility-Administered Medications as of 8/9/2024   Medication Dose Route Frequency Provider Last Rate Last Admin    acetaminophen (TYLENOL) tablet 650 mg  650 mg Oral Q4H PRN Santo Carter MD        Or    acetaminophen (TYLENOL) 160 MG/5ML oral solution 650 mg  650 mg Oral Q4H PRN Santo Carter MD        Or    acetaminophen (TYLENOL) suppository 650 mg  650 mg Rectal Q4H PRN Santo Carter MD        ALPRAZolam (XANAX) tablet 0.25 mg  0.25 mg Oral Q6H PRN Santo Carter MD        aluminum-magnesium hydroxide-simethicone (MAALOX MAX) 400-400-40 MG/5ML suspension 15 mL  15 mL Oral Q6H PRN Santo Carter MD        amLODIPine (NORVASC) tablet 5 mg  5 mg Oral Q24H Santo Carter MD        apixaban (ELIQUIS) tablet 10 mg  10 mg Oral BID Santo Carter MD        Followed by    [START ON 8/16/2024] apixaban (ELIQUIS) tablet 5 mg  5 mg Oral BID Santo Carter MD        sennosides-docusate (PERICOLACE) 8.6-50 MG per tablet 2 tablet  2 tablet Oral BID PRN Santo Carter MD        And    polyethylene glycol (MIRALAX) packet 17 g  17 g Oral Daily PRN Santo Carter MD        And    bisacodyl (DULCOLAX) EC tablet 5 mg  5 mg Oral Daily PRN Santo Carter MD        And    bisacodyl (DULCOLAX) suppository 10 mg  10 mg Rectal Daily PRN Santo Carter MD        famotidine (PEPCID) tablet 40 mg  40 mg Oral Daily Santo Carter MD        [COMPLETED] iopamidol (ISOVUE-370) 76 % injection 100 mL  100 mL  Intravenous Once in imaging Lyle Sierra MD   100 mL at 08/09/24 1153    [COMPLETED] ipratropium-albuterol (DUO-NEB) nebulizer solution 3 mL  3 mL Nebulization Once Lyle Sierra MD   3 mL at 08/09/24 1120    ipratropium-albuterol (DUO-NEB) nebulizer solution 3 mL  3 mL Nebulization Q4H - RT Santo Carter MD        Magnesium Standard Dose Replacement - Follow Nurse / BPA Driven Protocol   Does not apply PRN Santo Carter MD        methylPREDNISolone sodium succinate (SOLU-Medrol) 40 mg in sterile water (preservative free) 1 mL  40 mg Intravenous Q8H Santo Carter MD        metoprolol succinate XL (TOPROL-XL) 24 hr tablet 12.5 mg  12.5 mg Oral Q24H Santo Carter MD        nicotine (NICODERM CQ) 21 MG/24HR patch 1 patch  1 patch Transdermal Q24H Santo Carter MD        nitroglycerin (NITROSTAT) SL tablet 0.4 mg  0.4 mg Sublingual Q5 Min PRN Santo Carter MD        ondansetron (ZOFRAN) injection 4 mg  4 mg Intravenous Q4H PRN Santo Carter MD        potassium chloride (KLOR-CON M20) CR tablet 40 mEq  40 mEq Oral Q4H Santo Carter MD        Potassium Replacement - Follow Nurse / BPA Driven Protocol   Does not apply PRN Santo Carter MD        pravastatin (PRAVACHOL) tablet 40 mg  40 mg Oral Daily Santo Carter MD        sertraline (ZOLOFT) tablet 25 mg  25 mg Oral Daily Santo Carter MD        sodium chloride 0.9 % flush 10 mL  10 mL Intravenous PRN Lyle Sierra MD        sodium chloride 0.9 % flush 10 mL  10 mL Intravenous Q12H Santo Carter MD        sodium chloride 0.9 % flush 10 mL  10 mL Intravenous PRN Santo Carter MD        sodium chloride 0.9 % infusion 40 mL  40 mL Intravenous PRN Santo Carter MD        temazepam (RESTORIL) capsule 15 mg  15 mg Oral Nightly PRN Santo Carter MD        traMADol (ULTRAM) tablet 50 mg  50 mg Oral Q6H PRN Santo Carter MD         Lab Results (last 24 hours)       Procedure Component Value Units Date/Time    Urinalysis With Microscopic If Indicated (No Culture) - Urine,  Clean Catch [[id number]]  (Abnormal) Collected: 08/09/24 1243    Specimen: Urine, Clean Catch Updated: 08/09/24 1310     Color, UA Yellow     Appearance, UA Cloudy     pH, UA 7.5     Specific Gravity, UA 1.028     Glucose, UA Negative     Ketones, UA 15 mg/dL (1+)     Bilirubin, UA Negative     Blood, UA Negative     Protein, UA Negative     Leuk Esterase, UA Moderate (2+)     Nitrite, UA Negative     Urobilinogen, UA 2.0 E.U./dL    Urinalysis, Microscopic Only - Urine, Clean Catch [[id number]]  (Abnormal) Collected: 08/09/24 1243    Specimen: Urine, Clean Catch Updated: 08/09/24 1310     RBC, UA 0-2 /HPF      WBC, UA 3-5 /HPF      Bacteria, UA None Seen /HPF      Squamous Epithelial Cells, UA None Seen /HPF      Yeast, UA Small/1+ Yeast /HPF      Hyaline Casts, UA None Seen /LPF      Methodology Manual Light Microscopy    Magnesium [[id number]]  (Normal) Collected: 08/09/24 1048    Specimen: Blood Updated: 08/09/24 1225     Magnesium 1.7 mg/dL     Comprehensive Metabolic Panel [[id number]]  (Abnormal) Collected: 08/09/24 1048    Specimen: Blood Updated: 08/09/24 1121     Glucose 113 mg/dL      BUN 17 mg/dL      Creatinine 0.54 mg/dL      Sodium 140 mmol/L      Potassium 3.0 mmol/L      Chloride 100 mmol/L      CO2 28.4 mmol/L      Calcium 9.3 mg/dL      Total Protein 5.8 g/dL      Albumin 3.4 g/dL      ALT (SGPT) 10 U/L      AST (SGOT) 16 U/L      Alkaline Phosphatase 119 U/L      Total Bilirubin 1.3 mg/dL      Globulin 2.4 gm/dL      A/G Ratio 1.4 g/dL      BUN/Creatinine Ratio 31.5     Anion Gap 11.6 mmol/L      eGFR 90.9 mL/min/1.73     Narrative:      GFR Normal >60  Chronic Kidney Disease <60  Kidney Failure <15    The GFR formula is only valid for adults with stable renal function between ages 18 and 70.    Single High Sensitivity Troponin T [[id number]]  (Abnormal) Collected: 08/09/24 1048    Specimen: Blood Updated: 08/09/24 1121     HS Troponin T 23 ng/L     Narrative:      High Sensitive Troponin T Reference  Range:  <14.0 ng/L- Negative Female for AMI  <22.0 ng/L- Negative Male for AMI  >=14 - Abnormal Female indicating possible myocardial injury.  >=22 - Abnormal Male indicating possible myocardial injury.   Clinicians would have to utilize clinical acumen, EKG, Troponin, and serial changes to determine if it is an Acute Myocardial Infarction or myocardial injury due to an underlying chronic condition.         BNP [[id number]]  (Normal) Collected: 08/09/24 1048    Specimen: Blood Updated: 08/09/24 1117     proBNP 1,056.0 pg/mL     Narrative:      This assay is used as an aid in the diagnosis of individuals suspected of having heart failure. It can be used as an aid in the diagnosis of acute decompensated heart failure (ADHF) in patients presenting with signs and symptoms of ADHF to the emergency department (ED). In addition, NT-proBNP of <300 pg/mL indicates ADHF is not likely.    Age Range Result Interpretation  NT-proBNP Concentration (pg/mL:      <50             Positive            >450                   Gray                 300-450                    Negative             <300    50-75           Positive            >900                  Gray                300-900                  Negative            <300      >75             Positive            >1800                  Gray                300-1800                  Negative            <300    [place] Draw [[id number]] Collected: 08/09/24 1048    Specimen: Blood Updated: 08/09/24 1100    Narrative:      The following orders were created for panel order [place] Draw.  Procedure                               Abnormality         Status                     ---------                               -----------         ------                     Green Top (Gel)[[id number]]                                  Final result               Lavender Top[[id number]]                                     Final result               Gold Top - SST[[id number]]                                    Final result               Light Blue Top[[id number]]                                   Final result                 Please view results for these tests on the individual orders.    Green Top (Gel) [[id number]] Collected: 08/09/24 1048    Specimen: Blood Updated: 08/09/24 1100     Extra Tube Hold for add-ons.     Comment: Auto resulted.       Lavender Top [[id number]] Collected: 08/09/24 1048    Specimen: Blood Updated: 08/09/24 1100     Extra Tube hold for add-on     Comment: Auto resulted       Gold Top - SST [[id number]] Collected: 08/09/24 1048    Specimen: Blood Updated: 08/09/24 1100     Extra Tube Hold for add-ons.     Comment: Auto resulted.       Light Blue Top [[id number]] Collected: 08/09/24 1048    Specimen: Blood Updated: 08/09/24 1100     Extra Tube Hold for add-ons.     Comment: Auto resulted       CBC & Differential [[id number]]  (Abnormal) Collected: 08/09/24 1048    Specimen: Blood Updated: 08/09/24 1055    Narrative:      The following orders were created for panel order CBC & Differential.  Procedure                               Abnormality         Status                     ---------                               -----------         ------                     CBC Auto Differential[[id number]]        Abnormal            Final result                 Please view results for these tests on the individual orders.    CBC Auto Differential [[id number]]  (Abnormal) Collected: 08/09/24 1048    Specimen: Blood Updated: 08/09/24 1055     WBC 10.56 10*3/mm3      RBC 3.00 10*6/mm3      Hemoglobin 9.5 g/dL      Hematocrit 28.9 %      MCV 96.3 fL      MCH 31.7 pg      MCHC 32.9 g/dL      RDW 15.4 %      RDW-SD 52.4 fl      MPV 10.0 fL      Platelets 292 10*3/mm3      Neutrophil % 88.9 %      Lymphocyte % 6.6 %      Monocyte % 3.4 %      Eosinophil % 0.1 %      Basophil % 0.2 %      Immature Grans % 0.8 %      Neutrophils, Absolute 9.39 10*3/mm3      Lymphocytes, Absolute 0.70 10*3/mm3      Monocytes, Absolute 0.36  10*3/mm3      Eosinophils, Absolute 0.01 10*3/mm3      Basophils, Absolute 0.02 10*3/mm3      Immature Grans, Absolute 0.08 10*3/mm3      nRBC 0.0 /100 WBC           Imaging Results (Last 24 Hours)       Procedure Component Value Units Date/Time    CT Chest With Contrast Diagnostic [[id number]] Collected: 08/09/24 1205     Updated: 08/09/24 1218    Narrative:      CT CHEST W CONTRAST DIAGNOSTIC    Date of Exam: 8/9/2024 11:34 AM EDT    Indication: soa  shortness of breath.    Comparison: Chest x-ray 8/9/2024    Technique: Axial CT images were obtained of the chest after the uneventful intravenous administration of iodinated contrast.  Reconstructed coronal and sagittal images were also obtained. Automated exposure control and iterative construction methods were   used.      Findings:  PULMONARY VASCULATURE: There is a filling defect compatible with embolus in a subsegmental branch of a right middle lobe pulmonary artery (images 162 through 167 of series 401).Main pulmonary artery is normal in size. No evidence of right heart strain.    MEDIASTINUM:Unremarkable. Aortic and heart size are normal. No aortic dissection identified. No mass nor pericardial effusion.  CORONARY ARTERIES: Moderate calcified atherosclerotic disease.  LUNGS: Evaluation of lung parenchyma demonstrates an irregular nodule in the posterior left upper lung (image 15 of series 404 measuring 0.8 x 1.2 cm suspicious in appearance with micro spiculations.    There is bibasilar airspace disease left greater than right with mild right and moderate left pleural effusion. Findings are superimposed on a background of centrilobular emphysema.    PLEURAL SPACE: No evidence for pneumothorax.  LYMPH NODES: There are no pathologically enlarged lymph nodes.    UPPER ABDOMEN: There is nodular hyperplasia of the left adrenal gland.    OSSEOUS STRUCTURES: There is a compression deformity along the superior endplate of T11 which does not appear acute on current  "exam.      Impression:      Impression:    1. Findings compatible with pulmonary embolus in a right middle lobe subsegmental artery.    2. Irregular spiculated nodule in the left upper lung measuring up to 1.2 cm in size. This is suspicious for neoplasm. PET/CT or biopsy recommended.    3. Bibasilar airspace disease and pleural effusions.    Findings were called to Dr. Sierra by myself at 12:16 p.m.        Electronically Signed: Melani Kwok MD    8/9/2024 12:16 PM EDT    Workstation ID: [id number]    XR Chest 1 View [[id number]] Collected: 08/09/24 1032     Updated: 08/09/24 1041    Narrative:      XR CHEST 1 VW    Date of Exam: 8/9/2024 10:16 AM EDT    Indication: SOA Triage Protocol    Comparison: Chest AP dated 7/29/2024    Findings:  There are suspected fractures of the lateral left sixth and seventh ribs. Alignment is near-anatomic. There is a small left pleural effusion. Left basilar airspace opacity is noted. No pneumothorax is seen. Mild right basilar airspace opacity is noted.   The cardiac and mediastinal silhouettes appear normal.      Impression:      Impression:  1.Fractures of the lateral left sixth and seventh ribs in near-anatomic alignment.  2.Small left pleural effusion, new in the interval.  3.Bibasilar airspace opacity suspected to represent atelectasis. Pneumonia, pulmonary edema and aspiration are also in the differential.      Electronically Signed: Eliezer Barrera MD    8/9/2024 10:39 AM EDT    Workstation ID: [id number]          Orders (last 24 hrs)        Start     Ordered    08/16/24 0900  apixaban (ELIQUIS) tablet 5 mg  2 Times Daily        Placed in \"Followed by\" Linked Group    08/09/24 1414    08/10/24 0600  CBC Auto Differential  Morning Draw         08/09/24 1414    08/10/24 0600  Comprehensive Metabolic Panel  Morning Draw         08/09/24 1414    08/10/24 0351  Potassium  Timed         08/09/24 1451    08/09/24 2100  sodium chloride 0.9 % flush 10 mL  Every 12 Hours Scheduled      " "   08/09/24 1414 08/09/24 1800  Oral Care  2 Times Daily       08/09/24 1414 08/09/24 1800  Incentive Spirometry  Every 4 Hours While Awake       08/09/24 1414 08/09/24 1600  Vital Signs  Every 4 Hours       08/09/24 1414 08/09/24 1545  potassium chloride (KLOR-CON M20) CR tablet 40 mEq  Every 4 Hours         08/09/24 1451    08/09/24 1530  ipratropium-albuterol (DUO-NEB) nebulizer solution 3 mL  Every 4 Hours - RT         08/09/24 1414 08/09/24 1500  amLODIPine (NORVASC) tablet 5 mg  Every 24 Hours Scheduled         08/09/24 1414    08/09/24 1500  metoprolol succinate XL (TOPROL-XL) 24 hr tablet 12.5 mg  Every 24 Hours Scheduled         08/09/24 1414 08/09/24 1500  nicotine (NICODERM CQ) 21 MG/24HR patch 1 patch  Every 24 Hours Scheduled         08/09/24 1414 08/09/24 1500  pravastatin (PRAVACHOL) tablet 40 mg  Daily         08/09/24 1414 08/09/24 1500  sertraline (ZOLOFT) tablet 25 mg  Daily         08/09/24 1414 08/09/24 1500  famotidine (PEPCID) tablet 40 mg  Daily         08/09/24 1414 08/09/24 1500  methylPREDNISolone sodium succinate (SOLU-Medrol) 40 mg in sterile water (preservative free) 1 mL  Every 8 Hours         08/09/24 1414 08/09/24 1430  apixaban (ELIQUIS) tablet 10 mg  2 Times Daily        Placed in \"Followed by\" Linked Group    08/09/24 1414 08/09/24 1415  Diet: Cardiac; Healthy Heart (2-3 Na+); Fluid Consistency: Thin (IDDSI 0)  Diet Effective Now         08/09/24 1414    08/09/24 1415  Intake & Output  Every Shift       08/09/24 1414 08/09/24 1415  Weigh Patient  Once         08/09/24 1414 08/09/24 1415  Inpatient Case Management  Consult  Once        Provider:  (Not yet assigned)    08/09/24 1414 08/09/24 1415  Insert Peripheral IV  Once         08/09/24 1414 08/09/24 1415  Saline Lock & Maintain IV Access  Continuous,   Status:  Canceled         08/09/24 1414    08/09/24 1415  Continuous Cardiac Monitoring  Continuous      " "  Comments: Follow Standing Orders As Outlined in Process Instructions (Open Order Report to View Full Instructions)    08/09/24 1414 08/09/24 1415  Maintain IV Access  Continuous         08/09/24 1414    08/09/24 1415  Telemetry - Place Orders & Notify Provider of Results When Patient Experiences Acute Chest Pain, Dysrhythmia or Respiratory Distress  Continuous        Comments: Open Order Report to View Parameters Requiring Provider Notification    08/09/24 1414    08/09/24 1414  traMADol (ULTRAM) tablet 50 mg  Every 6 Hours PRN         08/09/24 1414    08/09/24 1414  sodium chloride 0.9 % flush 10 mL  As Needed         08/09/24 1414    08/09/24 1414  sodium chloride 0.9 % infusion 40 mL  As Needed         08/09/24 1414    08/09/24 1414  aluminum-magnesium hydroxide-simethicone (MAALOX MAX) 400-400-40 MG/5ML suspension 15 mL  Every 6 Hours PRN         08/09/24 1414    08/09/24 1414  ALPRAZolam (XANAX) tablet 0.25 mg  Every 6 Hours PRN         08/09/24 1414    08/09/24 1414  ondansetron (ZOFRAN) injection 4 mg  Every 4 Hours PRN         08/09/24 1414    08/09/24 1414  temazepam (RESTORIL) capsule 15 mg  Nightly PRN         08/09/24 1414    08/09/24 1414  nitroglycerin (NITROSTAT) SL tablet 0.4 mg  Every 5 Minutes PRN         08/09/24 1414    08/09/24 1414  acetaminophen (TYLENOL) tablet 650 mg  Every 4 Hours PRN        Placed in \"Or\" Linked Group    08/09/24 1414    08/09/24 1414  acetaminophen (TYLENOL) 160 MG/5ML oral solution 650 mg  Every 4 Hours PRN        Placed in \"Or\" Linked Group    08/09/24 1414    08/09/24 1414  acetaminophen (TYLENOL) suppository 650 mg  Every 4 Hours PRN        Placed in \"Or\" Linked Group    08/09/24 1414    08/09/24 1414  sennosides-docusate (PERICOLACE) 8.6-50 MG per tablet 2 tablet  2 Times Daily PRN        Placed in \"And\" Linked Group    08/09/24 1414 08/09/24 1414  polyethylene glycol (MIRALAX) packet 17 g  Daily PRN        Placed in \"And\" Linked Group    08/09/24 1414    " "08/09/24 1414  bisacodyl (DULCOLAX) EC tablet 5 mg  Daily PRN        Placed in \"And\" Linked Group    08/09/24 1414    08/09/24 1414  bisacodyl (DULCOLAX) suppository 10 mg  Daily PRN        Placed in \"And\" Linked Group    08/09/24 1414    08/09/24 1414  Potassium Replacement - Follow Nurse / BPA Driven Protocol  As Needed         08/09/24 1414    08/09/24 1414  Magnesium Standard Dose Replacement - Follow Nurse / BPA Driven Protocol  As Needed         08/09/24 1414    08/09/24 1307  Inpatient Admission  Once         08/09/24 1309    08/09/24 1307  Code Status and Medical Interventions: CPR (Attempt to Resuscitate); Full Support  Continuous         08/09/24 1309    08/09/24 1300  Enoxaparin Sodium (LOVENOX) syringe 50 mg  Once,   Status:  Discontinued         08/09/24 1240    08/09/24 1253  Urinalysis, Microscopic Only - Urine, Clean Catch  Once         08/09/24 1252    08/09/24 1242  Urinalysis With Microscopic If Indicated (No Culture) - Urine, Clean Catch  Once         08/09/24 1241    08/09/24 1218  Magnesium  Once         08/09/24 1217    08/09/24 1217  IP General Consult (Use specialty-specific consult if known)  Once        Provider:  Santo Carter MD    08/09/24 1217    08/09/24 1215  iopamidol (ISOVUE-370) 76 % injection 100 mL  Once in Imaging         08/09/24 1153    08/09/24 1115  ipratropium-albuterol (DUO-NEB) nebulizer solution 3 mL  Once         08/09/24 1045    08/09/24 1046  CT Chest With Contrast Diagnostic  1 Time Imaging         08/09/24 1045    08/09/24 1005  NPO Diet NPO Type: Strict NPO  Diet Effective Now,   Status:  Canceled         08/09/24 1004    08/09/24 1005  Undress & Gown  Once         08/09/24 1004    08/09/24 1005  Cardiac Monitoring  Continuous,   Status:  Canceled        Comments: Follow Standing Orders As Outlined in Process Instructions (Open Order Report to View Full Instructions)    08/09/24 1004    08/09/24 1005  Continuous Pulse Oximetry  Continuous         08/09/24 1004    " 08/09/24 1005  Vital Signs  Per Hospital Policy         08/09/24 1004    08/09/24 1005  ECG 12 Lead ED Triage Standing Order; SOA  Once         08/09/24 1004    08/09/24 1005  XR Chest 1 View  1 Time Imaging         08/09/24 1004    08/09/24 1005  Insert Peripheral IV  Once         08/09/24 1004    08/09/24 1005  [place] Draw  Once         08/09/24 1004    08/09/24 1005  CBC & Differential  Once         08/09/24 1004    08/09/24 1005  Comprehensive Metabolic Panel  Once         08/09/24 1004    08/09/24 1005  BNP  Once         08/09/24 1004    08/09/24 1005  Single High Sensitivity Troponin T  Once         08/09/24 1004    08/09/24 1005  Green Top (Gel)  PROCEDURE ONCE         08/09/24 1004    08/09/24 1005  Lavender Top  PROCEDURE ONCE         08/09/24 1004    08/09/24 1005  Gold Top - SST  PROCEDURE ONCE         08/09/24 1004    08/09/24 1005  Light Blue Top  PROCEDURE ONCE         08/09/24 1004    08/09/24 1005  CBC Auto Differential  PROCEDURE ONCE         08/09/24 1004    08/09/24 1004  sodium chloride 0.9 % flush 10 mL  As Needed         08/09/24 1004    Unscheduled  Oxygen Therapy- Nasal Cannula; Titrate 1-6 LPM Per SpO2; 90 - 95%  Continuous PRN       08/09/24 1004    Unscheduled  Up With Assistance  As Needed       08/09/24 1414    Unscheduled  Oxygen Therapy- Nasal Cannula; Titrate 1-6 LPM Per SpO2; 90 - 95%  Continuous PRN       08/09/24 1414    Pending  Inpatient Consult to Advance Care Planning  Once        Provider:  (Not yet assigned)    Pending

## 2024-08-09 NOTE — PLAN OF CARE
Goal Outcome Evaluation:  Plan of Care Reviewed With: patient        Progress: improving  Outcome Evaluation: No c/o of n/v or pain this shift. O2 titrated to 4.5L via nasal cannula, tolerating well. Current O2 sats 95%. Tolerating diet well.

## 2024-08-09 NOTE — H&P
[facility]   HISTORY AND PHYSICAL    Patient Name: Danielle Granda  : 1940  MRN: [id number]  Primary Care Physician:  Santo Carter MD  Date of admission: 2024    Subjective   Subjective     Chief Complaint:   Shortness of breath and hypoxia      HPI:    Danielle Granda is a 84 y.o. female who was recently discharged post hip fracture and surgery.  Patient was seen by home health and for physical therapy and was found hypoxic.  Patient has trouble breathing.  She is smoking again.  She denies any chest pain.  Workup in the ER revealed PE as well as possible COPD exacerbation when examined patient is awake alert.  Son at bedside.        Review of Systems:    Denies any chest pain.  Complains of shortness of breath.  Minimal cough.  Wheezing.  No nausea vomiting or diarrhea.  Left hip pain    Personal History     Past Medical History:   Diagnosis Date   • Depression    • Hypertension        Past Surgical History:   Procedure Laterality Date   • HIP INTERTROCHANTERIC NAILING Left 2024    Procedure: HIP INTERTROCHANTERIC NAILING;  Surgeon: Dutch Farmer MD;  Location: [facility];  Service: Orthopedics;  Laterality: Left;   • HYSTERECTOMY         Family History: family history includes Lung cancer in her sister. Otherwise pertinent FHx was reviewed and not pertinent to current issue.    Social History:  reports that she has been smoking cigarettes. She has never used smokeless tobacco. She reports that she does not drink alcohol and does not use drugs.    Home Medications:  Budeson-Glycopyrrol-Formoterol, albuterol sulfate HFA, amLODIPine, aspirin, metoprolol succinate XL, nicotine, pravastatin, sennosides-docusate, sertraline, and traMADol      Allergies:  No Known Allergies    Objective   Objective     Vitals:   Temp:  [97.9 °F (36.6 °C)-98.6 °F (37 °C)] 97.9 °F (36.6 °C)  Heart Rate:  [79-87] 87  Resp:  [16-21] 16  BP: (137-148)/(71-91) 137/72  Flow (L/min):  [3-4.5] 4.5    Physical  Exam    Elderly female looks of her stated age, not in acute distress.  No JVD on exam.  Heart regular.  Lungs diminished breath sounds.  Bilateral occasional rhonchi.  Abdomen soft.  Extremities trace of edema hip wound healing nicely.  No calf tenderness      I have personally reviewed the results from the time of this admission to 8/9/2024 19:13 EDT and agree with these findings:  [x]  Laboratory  []  Microbiology  [x]  Radiology  [x]  EKG/Telemetry   []  Cardiology/Vascular   []  Pathology  []  Old records  []  Other:    CBC:    WBC   Date Value Ref Range Status   08/09/2024 10.56 3.40 - 10.80 10*3/mm3 Final     RBC   Date Value Ref Range Status   08/09/2024 3.00 (L) 3.77 - 5.28 10*6/mm3 Final     Hemoglobin   Date Value Ref Range Status   08/09/2024 9.5 (L) 12.0 - 15.9 g/dL Final     Hematocrit   Date Value Ref Range Status   08/09/2024 28.9 (L) 34.0 - 46.6 % Final     MCV   Date Value Ref Range Status   08/09/2024 96.3 79.0 - 97.0 fL Final     MCH   Date Value Ref Range Status   08/09/2024 31.7 26.6 - 33.0 pg Final     MCHC   Date Value Ref Range Status   08/09/2024 32.9 31.5 - 35.7 g/dL Final     RDW   Date Value Ref Range Status   08/09/2024 15.4 12.3 - 15.4 % Final     RDW-SD   Date Value Ref Range Status   08/09/2024 52.4 37.0 - 54.0 fl Final     MPV   Date Value Ref Range Status   08/09/2024 10.0 6.0 - 12.0 fL Final     Platelets   Date Value Ref Range Status   08/09/2024 292 140 - 450 10*3/mm3 Final     Neutrophil %   Date Value Ref Range Status   08/09/2024 88.9 (H) 42.7 - 76.0 % Final     Lymphocyte %   Date Value Ref Range Status   08/09/2024 6.6 (L) 19.6 - 45.3 % Final     Monocyte %   Date Value Ref Range Status   08/09/2024 3.4 (L) 5.0 - 12.0 % Final     Eosinophil %   Date Value Ref Range Status   08/09/2024 0.1 (L) 0.3 - 6.2 % Final     Basophil %   Date Value Ref Range Status   08/09/2024 0.2 0.0 - 1.5 % Final     Immature Grans %   Date Value Ref Range Status   08/09/2024 0.8 (H) 0.0 - 0.5 %  Final     Neutrophils, Absolute   Date Value Ref Range Status   08/09/2024 9.39 (H) 1.70 - 7.00 10*3/mm3 Final     Lymphocytes, Absolute   Date Value Ref Range Status   08/09/2024 0.70 0.70 - 3.10 10*3/mm3 Final     Monocytes, Absolute   Date Value Ref Range Status   08/09/2024 0.36 0.10 - 0.90 10*3/mm3 Final     Eosinophils, Absolute   Date Value Ref Range Status   08/09/2024 0.01 0.00 - 0.40 10*3/mm3 Final     Basophils, Absolute   Date Value Ref Range Status   08/09/2024 0.02 0.00 - 0.20 10*3/mm3 Final     Immature Grans, Absolute   Date Value Ref Range Status   08/09/2024 0.08 (H) 0.00 - 0.05 10*3/mm3 Final     nRBC   Date Value Ref Range Status   08/09/2024 0.0 0.0 - 0.2 /100 WBC Final        BMP:    Lab Results   Component Value Date    GLUCOSE 113 (H) 08/09/2024    BUN 17 08/09/2024    CREATININE 0.54 (L) 08/09/2024    BCR 31.5 (H) 08/09/2024    K 3.0 (L) 08/09/2024    CO2 28.4 08/09/2024    CALCIUM 9.3 08/09/2024    ALBUMIN 3.4 (L) 08/09/2024    LABIL2 1.6 01/23/2020    AST 16 08/09/2024    ALT 10 08/09/2024        CT Chest With Contrast Diagnostic    Result Date: 8/9/2024  Impression: 1. Findings compatible with pulmonary embolus in a right middle lobe subsegmental artery. 2. Irregular spiculated nodule in the left upper lung measuring up to 1.2 cm in size. This is suspicious for neoplasm. PET/CT or biopsy recommended. 3. Bibasilar airspace disease and pleural effusions. Findings were called to Dr. Sierra by myself at 12:16 p.m. Electronically Signed: Melani Kwok MD  8/9/2024 12:16 PM EDT  Workstation ID: [id number]    XR Chest 1 View    Result Date: 8/9/2024  Impression: 1.Fractures of the lateral left sixth and seventh ribs in near-anatomic alignment. 2.Small left pleural effusion, new in the interval. 3.Bibasilar airspace opacity suspected to represent atelectasis. Pneumonia, pulmonary edema and aspiration are also in the differential. Electronically Signed: Eliezer Barrera MD  8/9/2024 10:39 AM EDT   Workstation ID: [id number]            Assessment & Plan   Assessment / Plan       Current Diagnosis:  Active Hospital Problems    Diagnosis    • **COPD exacerbation    • History of bradycardia    • Pulmonary embolus    • Essential hypertension      Plan:   Patient with small pulmonary embolus and COPD exacerbation with hypoxia.  Admit to hospital.  Oxygen supplement.  Start Eliquis.  For COPD exacerbation we will use low-dose steroids and neb treatments.  Resume home meds.  Watch for bradycardia patient had episodes of bradycardia and pauses in the past.  Discussed with patient's son.  Further management will based on clinical course.      VTE Prophylaxis:  Pharmacologic VTE prophylaxis orders are present.        GI Prophylaxis:       Pepcid/Protonix    CODE STATUS:    Code Status (Patient has no pulse and is not breathing): CPR (Attempt to Resuscitate)  Medical Interventions (Patient has pulse or is breathing): Full Support    Admission Status:  I believe this patient meets inpatient status.             I have dictated this note utilizing Dragon Dictation.             Please note that portions of this note were completed with a voice recognition program.             Part of this note may be an electronic transcription/translation of spoken language to printed text         using the Dragon Dictation System.       Electronically signed by Santo Carter MD, 08/09/24, 7:11 PM EDT.    Total time spent with in evaluation and management:

## 2024-08-09 NOTE — ED NOTES
Patient arrived via EMS. Patient came from home. Occupational therapist saw patient O2 states decreasing and called EMS. Patient was sating at 71%. Patient is not c/o SOB. EMS gave Duoneb 8 L patient states went up to 98%. EMS gave 125 solumedrol. Patient on endtidal 2L. Patient has history of COPD

## 2024-08-10 LAB
ALBUMIN SERPL-MCNC: 3.2 G/DL (ref 3.5–5.2)
ALBUMIN/GLOB SERPL: 1.1 G/DL
ALP SERPL-CCNC: 137 U/L (ref 39–117)
ALT SERPL W P-5'-P-CCNC: 10 U/L (ref 1–33)
ANION GAP SERPL CALCULATED.3IONS-SCNC: 10 MMOL/L (ref 5–15)
AST SERPL-CCNC: 18 U/L (ref 1–32)
BASOPHILS # BLD AUTO: 0.02 10*3/MM3 (ref 0–0.2)
BASOPHILS NFR BLD AUTO: 0.2 % (ref 0–1.5)
BILIRUB SERPL-MCNC: 0.9 MG/DL (ref 0–1.2)
BUN SERPL-MCNC: 20 MG/DL (ref 8–23)
BUN/CREAT SERPL: 39.2 (ref 7–25)
CALCIUM SPEC-SCNC: 9.5 MG/DL (ref 8.6–10.5)
CHLORIDE SERPL-SCNC: 103 MMOL/L (ref 98–107)
CO2 SERPL-SCNC: 27 MMOL/L (ref 22–29)
CREAT SERPL-MCNC: 0.51 MG/DL (ref 0.57–1)
DEPRECATED RDW RBC AUTO: 56.1 FL (ref 37–54)
EGFRCR SERPLBLD CKD-EPI 2021: 92.2 ML/MIN/1.73
EOSINOPHIL # BLD AUTO: 0 10*3/MM3 (ref 0–0.4)
EOSINOPHIL NFR BLD AUTO: 0 % (ref 0.3–6.2)
ERYTHROCYTE [DISTWIDTH] IN BLOOD BY AUTOMATED COUNT: 15.7 % (ref 12.3–15.4)
GLOBULIN UR ELPH-MCNC: 3 GM/DL
GLUCOSE SERPL-MCNC: 182 MG/DL (ref 65–99)
HCT VFR BLD AUTO: 29.6 % (ref 34–46.6)
HGB BLD-MCNC: 9.4 G/DL (ref 12–15.9)
IMM GRANULOCYTES # BLD AUTO: 0.08 10*3/MM3 (ref 0–0.05)
IMM GRANULOCYTES NFR BLD AUTO: 0.8 % (ref 0–0.5)
LYMPHOCYTES # BLD AUTO: 0.4 10*3/MM3 (ref 0.7–3.1)
LYMPHOCYTES NFR BLD AUTO: 4.1 % (ref 19.6–45.3)
MCH RBC QN AUTO: 31.9 PG (ref 26.6–33)
MCHC RBC AUTO-ENTMCNC: 31.8 G/DL (ref 31.5–35.7)
MCV RBC AUTO: 100.3 FL (ref 79–97)
MONOCYTES # BLD AUTO: 0.25 10*3/MM3 (ref 0.1–0.9)
MONOCYTES NFR BLD AUTO: 2.6 % (ref 5–12)
NEUTROPHILS NFR BLD AUTO: 9 10*3/MM3 (ref 1.7–7)
NEUTROPHILS NFR BLD AUTO: 92.3 % (ref 42.7–76)
NRBC BLD AUTO-RTO: 0 /100 WBC (ref 0–0.2)
PLATELET # BLD AUTO: 294 10*3/MM3 (ref 140–450)
PMV BLD AUTO: 10.6 FL (ref 6–12)
POTASSIUM SERPL-SCNC: 4.4 MMOL/L (ref 3.5–5.2)
PROT SERPL-MCNC: 6.2 G/DL (ref 6–8.5)
RBC # BLD AUTO: 2.95 10*6/MM3 (ref 3.77–5.28)
SODIUM SERPL-SCNC: 140 MMOL/L (ref 136–145)
WBC NRBC COR # BLD AUTO: 9.75 10*3/MM3 (ref 3.4–10.8)

## 2024-08-10 PROCEDURE — 80053 COMPREHEN METABOLIC PANEL: CPT | Performed by: INTERNAL MEDICINE

## 2024-08-10 PROCEDURE — 25010000002 METHYLPREDNISOLONE PER 40 MG: Performed by: INTERNAL MEDICINE

## 2024-08-10 PROCEDURE — 94799 UNLISTED PULMONARY SVC/PX: CPT

## 2024-08-10 PROCEDURE — 85025 COMPLETE CBC W/AUTO DIFF WBC: CPT | Performed by: INTERNAL MEDICINE

## 2024-08-10 PROCEDURE — 94664 DEMO&/EVAL PT USE INHALER: CPT

## 2024-08-10 PROCEDURE — 97161 PT EVAL LOW COMPLEX 20 MIN: CPT

## 2024-08-10 PROCEDURE — 97116 GAIT TRAINING THERAPY: CPT

## 2024-08-10 RX ADMIN — METHYLPREDNISOLONE SODIUM SUCCINATE 40 MG: 40 INJECTION INTRAMUSCULAR; INTRAVENOUS at 22:06

## 2024-08-10 RX ADMIN — IPRATROPIUM BROMIDE AND ALBUTEROL SULFATE 3 ML: .5; 3 SOLUTION RESPIRATORY (INHALATION) at 12:29

## 2024-08-10 RX ADMIN — SERTRALINE 25 MG: 25 TABLET, FILM COATED ORAL at 09:34

## 2024-08-10 RX ADMIN — AMLODIPINE BESYLATE 5 MG: 5 TABLET ORAL at 09:35

## 2024-08-10 RX ADMIN — IPRATROPIUM BROMIDE AND ALBUTEROL SULFATE 3 ML: .5; 3 SOLUTION RESPIRATORY (INHALATION) at 03:28

## 2024-08-10 RX ADMIN — Medication 10 ML: at 09:38

## 2024-08-10 RX ADMIN — NICOTINE 1 PATCH: 21 PATCH, EXTENDED RELEASE TRANSDERMAL at 09:37

## 2024-08-10 RX ADMIN — APIXABAN 10 MG: 5 TABLET, FILM COATED ORAL at 09:35

## 2024-08-10 RX ADMIN — FAMOTIDINE 40 MG: 20 TABLET ORAL at 09:34

## 2024-08-10 RX ADMIN — Medication 10 ML: at 22:06

## 2024-08-10 RX ADMIN — IPRATROPIUM BROMIDE AND ALBUTEROL SULFATE 3 ML: .5; 3 SOLUTION RESPIRATORY (INHALATION) at 18:53

## 2024-08-10 RX ADMIN — PRAVASTATIN SODIUM 40 MG: 20 TABLET ORAL at 09:34

## 2024-08-10 RX ADMIN — IPRATROPIUM BROMIDE AND ALBUTEROL SULFATE 3 ML: .5; 3 SOLUTION RESPIRATORY (INHALATION) at 22:47

## 2024-08-10 RX ADMIN — METOPROLOL SUCCINATE 12.5 MG: 25 TABLET, EXTENDED RELEASE ORAL at 09:34

## 2024-08-10 RX ADMIN — IPRATROPIUM BROMIDE AND ALBUTEROL SULFATE 3 ML: .5; 3 SOLUTION RESPIRATORY (INHALATION) at 16:54

## 2024-08-10 RX ADMIN — METHYLPREDNISOLONE SODIUM SUCCINATE 40 MG: 40 INJECTION INTRAMUSCULAR; INTRAVENOUS at 14:56

## 2024-08-10 RX ADMIN — IPRATROPIUM BROMIDE AND ALBUTEROL SULFATE 3 ML: .5; 3 SOLUTION RESPIRATORY (INHALATION) at 06:35

## 2024-08-10 RX ADMIN — METHYLPREDNISOLONE SODIUM SUCCINATE 40 MG: 40 INJECTION INTRAMUSCULAR; INTRAVENOUS at 06:05

## 2024-08-10 RX ADMIN — APIXABAN 10 MG: 5 TABLET, FILM COATED ORAL at 22:06

## 2024-08-10 NOTE — PROGRESS NOTES
[facility]     Progress Note    Patient Name: Danielle Granda  : 1940  MRN: [id number]  Primary Care Physician:  Santo Carter MD  Date of admission: 2024      Subjective   Brief summary.  Patient admitted with hypoxia and COPD exacerbation      HPI:  Feeling better today sitting in chair.  No chest pain, no shortness of breath.  Minimal pain in the hip.  Not making much movement and walking.    Review of Systems     No chest pain, short of breath and wheezing.  Improving.  No fever chills wants to go home      Objective     Vitals:   Temp:  [97.3 °F (36.3 °C)-98.6 °F (37 °C)] 97.3 °F (36.3 °C)  Heart Rate:  [73-88] 77  Resp:  [16-18] 16  BP: (124-151)/(55-91) 131/68  Flow (L/min):  [3-4.5] 4.5    Physical Exam :     [place] female not in acute distress.  Heart regular.  Lungs occasional rhonchi.  Abdomen soft.  Extremities no edema      Result Review:  I have personally reviewed the results from the time of this admission to 8/10/2024 12:46 EDT and agree with these findings:  [x]  Laboratory  []  Microbiology  []  Radiology  []  EKG/Telemetry   []  Cardiology/Vascular   []  Pathology  []  Old records  []  Other:           Assessment / Plan       Active Hospital Problems:  Active Hospital Problems    Diagnosis    • **COPD exacerbation    • History of bradycardia    • Pulmonary embolus    • Essential hypertension        Plan:   Patient diagnosed with pulmonary embolus.  Started on Eliquis, doing well.  Steroids for COPD.  Will taper it down.  Increase activity.  Continue nebs.  Possible discharge in 1 to 2 days       VTE Prophylaxis:  Pharmacologic VTE prophylaxis orders are present.        CODE STATUS:   Code Status (Patient has no pulse and is not breathing): CPR (Attempt to Resuscitate)  Medical Interventions (Patient has pulse or is breathing): Full Support            Electronically signed by Santo Carter MD, 08/10/24, 12:46 PM EDT.

## 2024-08-10 NOTE — PLAN OF CARE
Goal Outcome Evaluation:  Plan of Care Reviewed With: patient        Progress: improving  Outcome Evaluation: Pt presents with decreased functional mobility secondary to current hospitalization. Skilled PT intervention is needed to address noted deficits in order to restore to PLOF.      Anticipated Discharge Disposition (PT): home with home health, home with assist

## 2024-08-10 NOTE — OUTREACH NOTE
General Surgery Week 1 Survey      Flowsheet Row Responses   [facility] facility patient discharged from? [facility]   Does the patient have one of the following disease processes/diagnoses(primary or secondary)? General Surgery   Week 1 attempt successful? No   Unsuccessful attempts Attempt 2   Revoke Readmitted            FAMILIA COOK - Registered Nurse

## 2024-08-10 NOTE — PLAN OF CARE
Goal Outcome Evaluation:  Plan of Care Reviewed With: patient        Progress: improving  Outcome Evaluation: No c/o n/v or pain this shift. Tolerating diet well. 4.5L O2 via NC maintained, current sats 96%. New dressings applied to surgical sites, skin clean, dry, intact. Dressing changed on bottom, wound remains open & red. Pt ambulated with PT approx. 150ft this shift.

## 2024-08-10 NOTE — PLAN OF CARE
Goal Outcome Evaluation:  Plan of Care Reviewed With: patient, family        Progress: no change  Outcome Evaluation: No changes overnight.  No complaints of pain.  Patient is still on 4.5L NC with oxygen saturation at 94%.  Family has remained at bedside.  Call light is in reach.

## 2024-08-10 NOTE — THERAPY EVALUATION
Acute Care - Physical Therapy Initial Evaluation   [facility]     Patient Name: Danielle Granda  : 1940  MRN: [id number]  Today's Date: 8/10/2024   Onset of Illness/Injury or Date of Surgery: 24  Visit Dx:     ICD-10-CM ICD-9-CM   1. Acute respiratory failure with hypoxia  J96.01 518.81   2. Other acute pulmonary embolism without acute cor pulmonale  I26.99 415.19   3. Pleural effusion  J90 511.9   4. Difficulty in walking  R26.2 719.7     Patient Active Problem List   Diagnosis    Fracture    Closed intertrochanteric fracture of hip, left, initial encounter    COPD (chronic obstructive pulmonary disease)    Postoperative anemia due to acute blood loss    Essential hypertension    Bradycardia, sinus    COPD exacerbation    History of bradycardia    Pulmonary embolus     Past Medical History:   Diagnosis Date    Depression     Hypertension      Past Surgical History:   Procedure Laterality Date    HIP INTERTROCHANTERIC NAILING Left 2024    Procedure: HIP INTERTROCHANTERIC NAILING;  Surgeon: Dutch Farmer MD;  Location: [facility];  Service: Orthopedics;  Laterality: Left;    HYSTERECTOMY       PT Assessment (Last 12 Hours)       PT Evaluation and Treatment       Row Name 08/10/24 1320          Physical Therapy Time and Intention    Subjective Information complains of;fatigue  -DW     Document Type evaluation  -DW     Mode of Treatment individual therapy;physical therapy  -DW     Patient Effort good  -DW     Symptoms Noted During/After Treatment fatigue  -DW       Row Name 08/10/24 1320          General Information    Patient Profile Reviewed yes  -DW     Onset of Illness/Injury or Date of Surgery 24  -DW     Referring Physician Santo Carter  -DW     Patient Observations alert  -DW     Prior Level of Function all household mobility;community mobility;min assist:;ADL's  -DW     Equipment Currently Used at Home walker, rolling  -DW     Risks Reviewed patient:  -DW     Benefits Reviewed  patient:;improve function;increase independence;increase strength;increase balance  -     Barriers to Rehab none identified  -       Row Name 08/10/24 1320          Previous Level of Function/Home Environm    BADLs, Premorbid Functional Level partial assistance  -     IADLs, Premorbid Functional Level partial assistance  -     Bed Mobility, Premorbid Functional Level partial assistance  -     Transfers, Premorbid Functional Level partial assistance;uses device or equipment  -     Household Ambulation, Premorbid Functional Level partial assistance;uses device or equipment  -     Community Ambulation, Premorbid Functional Level partial assistance;uses device or equipment  -       Row Name 08/10/24 1320          Living Environment    Current Living Arrangements home  -     People in Home other (see comments)  lives with family  -     Primary Care Provided by self  -       Row Name 08/10/24 1320          Home Use of Assistive/Adaptive Equipment    Equipment Currently Used at Home cane, quad tip  -       Row Name 08/10/24 1320          Cognition    Orientation Status (Cognition) oriented x 3  -       Row Name 08/10/24 1320          Range of Motion Comprehensive    General Range of Motion no range of motion deficits identified  -       Row Name 08/10/24 1320          Strength Comprehensive (MMT)    General Manual Muscle Testing (MMT) Assessment lower extremity strength deficits identified  -     Comment, General Manual Muscle Testing (MMT) Assessment B LE 4-/5  -       Row Name 08/10/24 1320          Bed Mobility    Bed Mobility bed mobility (all) activities  -     All Activities, [place] (Bed Mobility) minimum assist (75% patient effort)  -     Bed Mobility, Safety Issues decreased use of arms for pushing/pulling;decreased use of legs for bridging/pushing  -     Assistive Device (Bed Mobility) bed rails;draw sheet;head of bed elevated  -       Row Name 08/10/24 1320           Transfers    Transfers sit-stand transfer;stand-sit transfer  -DW       Row Name 08/10/24 1320          Sit-Stand Transfer    Sit-Stand [place] (Transfers) minimum assist (75% patient effort)  -     Assistive Device (Sit-Stand Transfers) walker, front-wheeled  -DW       Row Name 08/10/24 1320          Stand-Sit Transfer    Stand-Sit [place] (Transfers) minimum assist (75% patient effort)  -     Assistive Device (Stand-Sit Transfers) walker, front-wheeled  -DW       Row Name 08/10/24 1320          Gait/Stairs (Locomotion)    Gait/Stairs Locomotion gait/ambulation independence;gait/ambulation assistive device;distance ambulated  -     [place] Level (Gait) moderate assist (50% patient effort)  -     Assistive Device (Gait) walker, front-wheeled  -     Distance in Feet (Gait) 100  -DW       Row Name 08/10/24 1320          Safety Issues, Functional Mobility    Impairments Affecting Function (Mobility) balance;endurance/activity tolerance;strength  -       Row Name 08/10/24 1320          Balance    Balance Assessment standing dynamic balance  -     Dynamic Standing Balance moderate assist  -     Position/Device Used, Standing Balance walker, front-wheeled  -DW       Row Name             Wound 08/06/24 1022 Right gluteal Pressure Injury    Wound - Properties Group Placement Date: 08/06/24  -KE Placement Time: 1022  -KE Side: Right  -KE Location: gluteal  -KE Primary Wound Type: Pressure inj  -KE    Retired Wound - Properties Group Placement Date: 08/06/24  -KE Placement Time: 1022  -KE Side: Right  -KE Location: gluteal  -KE Primary Wound Type: Pressure inj  -KE    Retired Wound - Properties Group Date first assessed: 08/06/24  -KE Time first assessed: 1022  -KE Side: Right  -KE Location: gluteal  -KE Primary Wound Type: Pressure inj  -KE      Row Name 08/10/24 1320          Plan of Care Review    Plan of Care Reviewed With patient  -DW     Progress improving  -     Outcome Evaluation Pt  presents with decreased functional mobility secondary to current hospitalization. Skilled PT intervention is needed to address noted deficits in order to restore to PLOF.  -       Row Name 08/10/24 1320          Positioning and Restraints    Pre-Treatment Position in bed  -DW     Post Treatment Position bed  -DW     In Bed call light within reach;encouraged to call for assist;exit alarm on  -       Row Name 08/10/24 1320          Therapy Assessment/Plan (PT)    PT Diagnosis (PT) Difficulty in walking  -     Rehab Potential (PT) good, to achieve stated therapy goals  -     Criteria for Skilled Interventions Met (PT) yes;meets criteria;skilled treatment is necessary  -     Therapy Frequency (PT) daily  -     Problem List (PT) problems related to;balance;coordination;strength  -     Activity Limitations Related to Problem List (PT) unable to ambulate safely;unable to transfer safely;BADLs not performed adequately or safely;IADLs not performed adequately or safely  -       Row Name 08/10/24 1320          PT Evaluation Complexity    History, PT Evaluation Complexity no personal factors and/or comorbidities  -     Examination of Body Systems (PT Eval Complexity) 1-2 elements  -DW     Clinical Presentation (PT Evaluation Complexity) stable  -     Clinical Decision Making (PT Evaluation Complexity) low complexity  -DW     Overall Complexity (PT Evaluation Complexity) low complexity  -       Row Name 08/10/24 1320          Therapy Plan Review/Discharge Plan (PT)    Therapy Plan Review (PT) evaluation/treatment results reviewed  -       Row Name 08/10/24 1320          Physical Therapy Goals    Bed Mobility Goal Selection (PT) bed mobility, PT goal 1  -DW     Transfer Goal Selection (PT) transfer, PT goal 1  -DW     Gait Training Goal Selection (PT) gait training, PT goal 1  -       Row Name 08/10/24 1320          Bed Mobility Goal 1 (PT)    Activity/Assistive Device (Bed Mobility Goal 1, PT) bed  mobility activities, all  -DW     [place] Level/Cues Needed (Bed Mobility Goal 1, PT) minimum assist (75% or more patient effort)  -DW     Time Frame (Bed Mobility Goal 1, PT) long term goal (LTG);10 days  -DW       Row Name 08/10/24 1320          Transfer Goal 1 (PT)    Activity/Assistive Device (Transfer Goal 1, PT) transfers, all  -DW     [place] Level/Cues Needed (Transfer Goal 1, PT) minimum assist (75% or more patient effort)  -DW     Time Frame (Transfer Goal 1, PT) long term goal (LTG);10 days  -DW       Row Name 08/10/24 1320          Gait Training Goal 1 (PT)    Activity/Assistive Device (Gait Training Goal 1, PT) gait (walking locomotion);assistive device use  -DW     [place] Level (Gait Training Goal 1, PT) moderate assist (50-74% patient effort)  -DW     Distance (Gait Training Goal 1, PT) 100  -DW     Time Frame (Gait Training Goal 1, PT) long term goal (LTG);10 days  -DW               User Key  (r) = Recorded By, (t) = Taken By, (c) = Cosigned By      Initials Name Provider Type    Lorna Burleson, RN Registered Nurse    Jeremy Leo, PT Physical Therapist                    Physical Therapy Education       Title: PT OT SLP Therapies (Done)       Topic: Physical Therapy (Done)       Point: Mobility training (Done)       Learning Progress Summary             Patient Acceptance, E,TB, VU by LETA at 8/10/2024 1329                         Point: Home exercise program (Done)       Learning Progress Summary             Patient Acceptance, E,TB, VU by LETA at 8/10/2024 1329                         Point: Body mechanics (Done)       Learning Progress Summary             Patient Acceptance, E,TB, VU by LETA at 8/10/2024 1329                         Point: Precautions (Done)       Learning Progress Summary             Patient Acceptance, E,TB, VU by LETA at 8/10/2024 1329                                         User Key       Initials Effective Dates Name Provider Type Discipline    LETA 04/25/21 -   Jeremy Corral, JOSE Physical Therapist PT                  PT Recommendation and Plan  Anticipated Discharge Disposition (PT): home with home health, home with assist  Planned Therapy Interventions (PT): balance training, gait training, strengthening, transfer training  Therapy Frequency (PT): daily  Plan of Care Reviewed With: patient  Progress: improving  Outcome Evaluation: Pt presents with decreased functional mobility secondary to current hospitalization. Skilled PT intervention is needed to address noted deficits in order to restore to PLOF.   Outcome Measures       Row Name 08/10/24 1329             How much help from another person do you currently need...    Turning from your back to your side while in flat bed without using bedrails? 4  -DW      Moving from lying on back to sitting on the side of a flat bed without bedrails? 3  -DW      Moving to and from a bed to a chair (including a wheelchair)? 3  -DW      Standing up from a chair using your arms (e.g., wheelchair, bedside chair)? 3  -DW      Climbing 3-5 steps with a railing? 2  -DW      To walk in hospital room? 2  -DW      AM-PAC 6 Clicks Score (PT) 17  -DW      Highest Level of Mobility Goal 5 --> Static standing  -DW         Functional Assessment    Outcome Measure Options AM-PAC 6 Clicks Basic Mobility (PT)  -DW                User Key  (r) = Recorded By, (t) = Taken By, (c) = Cosigned By      Initials Name Provider Type    DW Jeremy Corral, PT Physical Therapist                     Time Calculation:    PT Charges       Row Name 08/10/24 1330             Time Calculation    PT Received On 08/10/24  -DW      PT Goal Re-Cert Due Date 08/19/24  -DW         Timed Charges    10326 - Gait Training Minutes  15  -DW         Untimed Charges    PT Eval/Re-eval Minutes 8  -DW         Total Minutes    Timed Charges Total Minutes 15  -DW      Untimed Charges Total Minutes 8  -DW       Total Minutes 23  -DW                User Key  (r) = Recorded By, (t) =  Taken By, (c) = Cosigned By      Initials Name Provider Type    DW Jeremy Corral, PT Physical Therapist                  Therapy Charges for Today       Code Description Service Date Service Provider Modifiers Qty    13938327650 HC GAIT TRAINING EA 15 MIN 8/10/2024 Jeremy Corral, PT GP 1    79990520846 HC PT EVAL LOW COMPLEXITY 2 8/10/2024 Jeremy Corral, PT GP 1            PT G-Codes  Outcome Measure Options: AM-PAC 6 Clicks Basic Mobility (PT)  AM-PAC 6 Clicks Score (PT): 17    Jeremy Corral, PT  8/10/2024

## 2024-08-11 ENCOUNTER — READMISSION MANAGEMENT (OUTPATIENT)
Dept: CALL CENTER | Facility: HOSPITAL | Age: 84
End: 2024-08-11
Payer: MEDICARE

## 2024-08-11 VITALS
TEMPERATURE: 97.3 F | RESPIRATION RATE: 18 BRPM | WEIGHT: 103.62 LBS | OXYGEN SATURATION: 96 % | HEIGHT: 60 IN | DIASTOLIC BLOOD PRESSURE: 62 MMHG | BODY MASS INDEX: 20.34 KG/M2 | SYSTOLIC BLOOD PRESSURE: 124 MMHG | HEART RATE: 77 BPM

## 2024-08-11 PROBLEM — J44.1 COPD EXACERBATION: Status: RESOLVED | Noted: 2024-08-09 | Resolved: 2024-08-11

## 2024-08-11 LAB
ARTERIAL PATENCY WRIST A: POSITIVE
ATMOSPHERIC PRESS: 747.5 MMHG
BASE EXCESS BLDA CALC-SCNC: 3.3 MMOL/L (ref -2–2)
BDY SITE: ABNORMAL
GAS FLOW AIRWAY: 2 LPM
HCO3 BLDA-SCNC: 26.9 MMOL/L (ref 22–26)
HCT VFR BLD CALC: 25 % (ref 38–51)
HEMODILUTION: NO
HGB BLDA-MCNC: 8.6 G/DL (ref 12–18)
INHALED O2 CONCENTRATION: 28 %
MODALITY: ABNORMAL
PCO2 BLDA: 35.9 MM HG (ref 35–45)
PH BLDA: 7.48 PH UNITS (ref 7.35–7.45)
PO2 BLD: 225 MM[HG] (ref 0–500)
PO2 BLDA: 63 MM HG (ref 80–100)
SAO2 % BLDCOA: 93.4 % (ref 95–99)

## 2024-08-11 PROCEDURE — 25010000002 METHYLPREDNISOLONE PER 40 MG: Performed by: INTERNAL MEDICINE

## 2024-08-11 PROCEDURE — 97165 OT EVAL LOW COMPLEX 30 MIN: CPT

## 2024-08-11 PROCEDURE — 94618 PULMONARY STRESS TESTING: CPT

## 2024-08-11 PROCEDURE — 94799 UNLISTED PULMONARY SVC/PX: CPT

## 2024-08-11 PROCEDURE — 82803 BLOOD GASES ANY COMBINATION: CPT

## 2024-08-11 PROCEDURE — 94664 DEMO&/EVAL PT USE INHALER: CPT

## 2024-08-11 PROCEDURE — 36600 WITHDRAWAL OF ARTERIAL BLOOD: CPT

## 2024-08-11 RX ORDER — PREDNISONE 20 MG/1
20 TABLET ORAL DAILY
Qty: 3 TABLET | Refills: 0 | Status: SHIPPED | OUTPATIENT
Start: 2024-08-11 | End: 2024-08-14

## 2024-08-11 RX ORDER — PREDNISONE 20 MG/1
20 TABLET ORAL DAILY
Qty: 3 TABLET | Refills: 0 | Status: SHIPPED | OUTPATIENT
Start: 2024-08-11 | End: 2024-08-11

## 2024-08-11 RX ADMIN — PRAVASTATIN SODIUM 40 MG: 20 TABLET ORAL at 13:48

## 2024-08-11 RX ADMIN — METHYLPREDNISOLONE SODIUM SUCCINATE 40 MG: 40 INJECTION INTRAMUSCULAR; INTRAVENOUS at 06:31

## 2024-08-11 RX ADMIN — IPRATROPIUM BROMIDE AND ALBUTEROL SULFATE 3 ML: .5; 3 SOLUTION RESPIRATORY (INHALATION) at 07:19

## 2024-08-11 RX ADMIN — AMLODIPINE BESYLATE 5 MG: 5 TABLET ORAL at 13:48

## 2024-08-11 RX ADMIN — SERTRALINE 25 MG: 25 TABLET, FILM COATED ORAL at 13:47

## 2024-08-11 RX ADMIN — ALPRAZOLAM 0.25 MG: 0.25 TABLET ORAL at 00:41

## 2024-08-11 RX ADMIN — IPRATROPIUM BROMIDE AND ALBUTEROL SULFATE 3 ML: .5; 3 SOLUTION RESPIRATORY (INHALATION) at 16:23

## 2024-08-11 RX ADMIN — METHYLPREDNISOLONE SODIUM SUCCINATE 40 MG: 40 INJECTION INTRAMUSCULAR; INTRAVENOUS at 16:33

## 2024-08-11 RX ADMIN — FAMOTIDINE 40 MG: 20 TABLET ORAL at 13:48

## 2024-08-11 RX ADMIN — APIXABAN 10 MG: 5 TABLET, FILM COATED ORAL at 13:47

## 2024-08-11 RX ADMIN — IPRATROPIUM BROMIDE AND ALBUTEROL SULFATE 3 ML: .5; 3 SOLUTION RESPIRATORY (INHALATION) at 02:34

## 2024-08-11 RX ADMIN — NICOTINE 1 PATCH: 21 PATCH, EXTENDED RELEASE TRANSDERMAL at 08:20

## 2024-08-11 RX ADMIN — METOPROLOL SUCCINATE 12.5 MG: 25 TABLET, EXTENDED RELEASE ORAL at 13:48

## 2024-08-11 RX ADMIN — IPRATROPIUM BROMIDE AND ALBUTEROL SULFATE 3 ML: .5; 3 SOLUTION RESPIRATORY (INHALATION) at 11:48

## 2024-08-11 NOTE — DISCHARGE SUMMARY
[facility]         DISCHARGE SUMMARY    Patient Name: Danielle Granda  : 1940  MRN: [id number]    Date of Admission: 2024  Date of Discharge: 2024  Primary Care Physician: Santo Carter MD    Consults       Date and Time Order Name Status Description    2024 12:33 PM Inpatient Pulmonology Consult      2024 12:17 PM IP General Consult (Use specialty-specific consult if known)      2024  8:51 AM Inpatient Pulmonology Consult Completed             Presenting Problem:   Pleural effusion [J90]  COPD exacerbation [J44.1]  Acute respiratory failure with hypoxia [J96.01]  Other acute pulmonary embolism without acute cor pulmonale [I26.99]    Active and Resolved Hospital Problems:  Active Hospital Problems    Diagnosis POA    History of bradycardia [Z87.898] Not Applicable    Pulmonary embolus [I26.99] Yes    Essential hypertension [I10] Yes      Resolved Hospital Problems    Diagnosis POA    **COPD exacerbation [J44.1] Yes         Hospital Course     Hospital Course:  Danielle Granda is a 84 y.o. female well-known to me with recent fall and hip fracture leading to her stay in hospital for several days was discharged 3 days ago came back to ER with increasing shortness of breath and hypoxia.  Patient's workup revealed small pulmonary embolus on CT scan.  Also she had some wheezing and shortness of breath.  Patient admitted to medical floor.  Started on IV steroids neb treatments and Eliquis.      Patient improved with these measures she was having difficulty with ambulation because of the hip fracture she was advised to go to inpatient rehab but she declined last time.  This time also she declined inpatient rehab and prefers to go home.    Due to patient's medical conditions, COPD, recent fracture and hypoxia patient requires positioning of body in the ways not feasible in an ordinary bed.  Patient requires frequent and immediate changes in body position.  The patient  requires head of the bed to be elevated more than 30 degrees due to her COPD.    Patient will need hospital bed for above reason, we will prescribe.  Continue home health.    Patient was also hypoxic on arrival and at the time of discharge walk test was done and patient qualified for oxygen dropping sats to 84 on room air.  Patient was placed on 2 L.  Walk oximetry test done on 8/11/2024 was reviewed.      DISCHARGE Follow Up Recommendations for labs and diagnostics:   Discharge to home with outpatient follow-up  Patient stable ready for discharge.  Advised to take medications as prescribed on discharge.  Recommended follow-up with consultants as advised.  Patient advised to return to ER if symptoms get worse.  Patient advised to follow-up with me/PCP post discharge in 1 week.      Day of Discharge     Vital Signs:  Temp:  [97.3 °F (36.3 °C)-98.4 °F (36.9 °C)] 97.3 °F (36.3 °C)  Heart Rate:  [66-89] 76  Resp:  [16-20] 20  BP: (120-140)/(54-80) 127/69  Flow (L/min):  [3-4.5] 3    Physical Exam:    Elderly female not in acute distress.  Heart regular.  Lungs diminished breath sounds.  Abdomen soft.  Extremities no edema      Pertinent  and/or Most Recent Results     LAB RESULTS:      Lab 08/10/24  0358 08/09/24  1048 08/05/24  0418   WBC 9.75 10.56 8.27   HEMOGLOBIN 9.4* 9.5* 9.0*   HEMATOCRIT 29.6* 28.9* 27.6*   PLATELETS 294 292 210   NEUTROS ABS 9.00* 9.39* 6.81   IMMATURE GRANS (ABS) 0.08* 0.08* 0.07*   LYMPHS ABS 0.40* 0.70 0.77   MONOS ABS 0.25 0.36 0.54   EOS ABS 0.00 0.01 0.06   .3* 96.3 96.5         Lab 08/10/24  0358 08/09/24  1048 08/06/24  0908 08/05/24  0418   SODIUM 140 140 139 137   POTASSIUM 4.4 3.0* 3.4* 3.3*   CHLORIDE 103 100 100 99   CO2 27.0 28.4 29.2* 25.9   ANION GAP 10.0 11.6 9.8 12.1   BUN 20 17 21 17   CREATININE 0.51* 0.54* 0.54* 0.44*   EGFR 92.2 90.9 90.9 95.5   GLUCOSE 182* 113* 92 90   CALCIUM 9.5 9.3 9.0 9.2   MAGNESIUM  --  1.7 1.6 1.8   PHOSPHORUS  --   --   --  2.8         Lab  08/10/24  0358 08/09/24  1048   TOTAL PROTEIN 6.2 5.8*   ALBUMIN 3.2* 3.4*   GLOBULIN 3.0 2.4   ALT (SGPT) 10 10   AST (SGOT) 18 16   BILIRUBIN 0.9 1.3*   ALK PHOS 137* 119*         Lab 08/09/24  1048   PROBNP 1,056.0   HSTROP T 23*                 Brief Urine Lab Results  (Last result in the past 365 days)        Color   Clarity   Blood   Leuk Est   Nitrite   Protein   CREAT   Urine HCG        08/09/24 1243 Yellow   Cloudy   Negative   Moderate (2+)   Negative   Negative                 Microbiology Results (last 10 days)       ** No results found for the last 240 hours. **            PROCEDURES:    CT Chest With Contrast Diagnostic    Result Date: 8/9/2024  Impression: Impression: 1. Findings compatible with pulmonary embolus in a right middle lobe subsegmental artery. 2. Irregular spiculated nodule in the left upper lung measuring up to 1.2 cm in size. This is suspicious for neoplasm. PET/CT or biopsy recommended. 3. Bibasilar airspace disease and pleural effusions. Findings were called to Dr. Sierra by myself at 12:16 p.m. Electronically Signed: Melani Kwok MD  8/9/2024 12:16 PM EDT  Workstation ID: [id number]    XR Chest 1 View    Result Date: 8/9/2024  Impression: Impression: 1.Fractures of the lateral left sixth and seventh ribs in near-anatomic alignment. 2.Small left pleural effusion, new in the interval. 3.Bibasilar airspace opacity suspected to represent atelectasis. Pneumonia, pulmonary edema and aspiration are also in the differential. Electronically Signed: Eliezer Barrera MD  8/9/2024 10:39 AM EDT  Workstation ID: [id number]    XR Chest 1 View    Result Date: 7/29/2024  Impression: No radiographic findings of acute cardiopulmonary abnormality. Electronically Signed: Lyle Corona  7/29/2024 4:26 PM EDT  Workstation ID: [id number]    XR Hip With or Without Pelvis 2 - 3 View Left    Result Date: 7/29/2024  Impression: Impression: 1. Comminuted intertrochanteric fracture of the left femoral neck with mild  impaction. 2. Diffuse osteopenia. Electronically Signed: Yaniv Pal MD  7/29/2024 3:08 PM EDT  Workstation ID: [id number]             Results for orders placed during the hospital encounter of 07/29/24    Adult Transthoracic Echo Complete W/ Cont if Necessary Per Protocol    Interpretation Summary    Left ventricular ejection fraction appears to be 61 - 65%.    Left ventricular diastolic function is consistent with (grade I) impaired relaxation.    Estimated right ventricular systolic pressure from tricuspid regurgitation is normal (<35 mmHg).      Labs Pending at Discharge:        Discharge Details        Discharge Medications        New Medications        Instructions Start Date   apixaban 5 MG tablet tablet  Commonly known as: ELIQUIS   5 mg, Oral, Every 12 Hours Scheduled      predniSONE 20 MG tablet  Commonly known as: DELTASONE   20 mg, Oral, Daily             Continue These Medications        Instructions Start Date   albuterol sulfate  (90 Base) MCG/ACT inhaler  Commonly known as: PROVENTIL HFA;VENTOLIN HFA;PROAIR HFA   2 puffs, Inhalation, Every 4 Hours PRN      amLODIPine 5 MG tablet  Commonly known as: NORVASC   5 mg, Oral, Every 24 Hours Scheduled      Breztri Aerosphere 160-9-4.8 MCG/ACT aerosol inhaler  Generic drug: Budeson-Glycopyrrol-Formoterol   2 puffs, Inhalation, 2 Times Daily      metoprolol succinate XL 25 MG 24 hr tablet  Commonly known as: TOPROL-XL   12.5 mg, Oral, Every 24 Hours Scheduled      nicotine 21 MG/24HR patch  Commonly known as: NICODERM CQ   1 patch, Transdermal, Every 24 Hours Scheduled      pravastatin 40 MG tablet  Commonly known as: PRAVACHOL   40 mg, Oral, Daily      sennosides-docusate 8.6-50 MG per tablet  Commonly known as: PERICOLACE   2 tablets, Oral, Every 12 Hours PRN      sertraline 25 MG tablet  Commonly known as: ZOLOFT   25 mg, Oral, Daily      traMADol 50 MG tablet  Commonly known as: ULTRAM   50 mg, Oral, Every 6 Hours PRN             Stop These  Medications      aspirin 325 MG EC tablet              No Known Allergies      Discharge Disposition:    Home-Health [place] [facility]    Diet:  Heart healthy      Discharge Activity:     Activity Instructions       Activity as Tolerated              Future Appointments   Date Time Provider Department Center   8/12/2024  1:45 PM [place] [facility]       Additional Instructions for the Follow-ups that You Need to Schedule       Discharge Follow-up with PCP   As directed       Currently Documented PCP:    Santo Carter MD    PCP Phone Number:    [phone]     Follow Up Details: As schedule next week        Discharge Follow-up with Specified Provider: Dr. Albarran in 2 weeks   As directed      To: Dr. Albarran in 2 weeks                Time spent on Discharge including face to face service: 43 minutes.            I have dictated this note utilizing Dragon Dictation.             Please note that portions of this note were completed with a voice recognition program.             Part of this note may be an electronic transcription/translation of spoken language to printed text         using the Dragon Dictation System.       Electronically signed by Santo Carter MD, 08/11/24, 12:36 PM EDT.

## 2024-08-11 NOTE — THERAPY EVALUATION
Patient Name: Danielle Granda  : 1940    MRN: [id number]                              Today's Date: 2024       Admit Date: 2024    Visit Dx:     ICD-10-CM ICD-9-CM   1. Acute respiratory failure with hypoxia  J96.01 518.81   2. Other acute pulmonary embolism without acute cor pulmonale  I26.99 415.19   3. Pleural effusion  J90 511.9   4. Difficulty in walking  R26.2 719.7   5. Impaired mobility and ADLs  Z74.09 V49.89    Z78.9      Patient Active Problem List   Diagnosis    Fracture    Closed intertrochanteric fracture of hip, left, initial encounter    COPD (chronic obstructive pulmonary disease)    Postoperative anemia due to acute blood loss    Essential hypertension    Bradycardia, sinus    History of bradycardia    Pulmonary embolus     Past Medical History:   Diagnosis Date    Depression     Hypertension      Past Surgical History:   Procedure Laterality Date    HIP INTERTROCHANTERIC NAILING Left 2024    Procedure: HIP INTERTROCHANTERIC NAILING;  Surgeon: Dutch Farmer MD;  Location: [facility];  Service: Orthopedics;  Laterality: Left;    HYSTERECTOMY        General Information       Row Name 24 1053          OT Time and Intention    Document Type evaluation  -AC     Mode of Treatment individual therapy;occupational therapy  -AC       Row Name 24 1053          General Information    Patient Profile Reviewed yes  -AC     Prior Level of Function --  family states patient was (I) with adls, functional mobitity, driving and Iadls prior to falls last month.  Since, patient requires rolling walker and assist with all transfers. Has BSC but needs bath bench for tub/shower.  -AC     Existing Precautions/Restrictions fall  -AC     Barriers to Rehab none identified  -AC       Row Name 24 1059          Occupational Profile    Reason for Services/Referral (Occupational Profile) Pt. is a 84year old female admitted for the above diagnosis. Pt. referred to OT services to  assess independence with ADLs and adl transfers/fx'l mobility. No previous OT services for current condition.  -       Row Name 08/11/24 1053          Living Environment    People in Home child(sandeep), adult  -       Row Name 08/11/24 1053          Cognition    Orientation Status (Cognition) oriented x 3  -       Row Name 08/11/24 1053          Safety Issues, Functional Mobility    Impairments Affecting Function (Mobility) balance;endurance/activity tolerance;strength  -               User Key  (r) = Recorded By, (t) = Taken By, (c) = Cosigned By      Initials Name Provider Type     Lisette Sierra, OT Occupational Therapist                     Mobility/ADL's       Row Name 08/11/24 1101          Bed Mobility    Bed Mobility bed mobility (all) activities  -     All Activities, [place] (Bed Mobility) contact guard  -     Assistive Device (Bed Mobility) head of bed elevated;bed rails  -       Row Name 08/11/24 1101          Transfers    Transfers sit-stand transfer  -       Row Name 08/11/24 1101          Sit-Stand Transfer    Sit-Stand [place] (Transfers) contact guard;1 person assist  -     Assistive Device (Sit-Stand Transfers) walker, front-wheeled  -       Row Name 08/11/24 1101          Functional Mobility    Functional Mobility- Ind. Level contact guard assist;minimum assist (75% patient effort);verbal cues required;1 person  -     Functional Mobility- Device walker, front-wheeled  -       Row Name 08/11/24 1101          Activities of Daily Living    BADL Assessment/Intervention --  Patient is set up for self-feeding, set up for grooming, set up for upper body bathing/dressing, max assist for lower body bathing/dressing, min/mod assist from toileting  -               User Key  (r) = Recorded By, (t) = Taken By, (c) = Cosigned By      Initials Name Provider Type     Lisette Sierra OT Occupational Therapist                   Obj/Interventions       Row Name 08/11/24 1103           Sensory Assessment (Somatosensory)    Sensory Assessment (Somatosensory) UE sensation intact  -AC       Row Name 08/11/24 1103          Vision Assessment/Intervention    Visual Impairment/Limitations WFL  -[facility] 08/11/24 1103          Range of Motion Comprehensive    General Range of Motion bilateral upper extremity ROM WFL  -AC       Row Name 08/11/24 1103          Strength Comprehensive (MMT)    Comment, General Manual Muscle Testing (MMT) Assessment 4-/5  -AC       Row Name 08/11/24 1103          Motor Skills    Motor Skills coordination;functional endurance  -     [facility]ination [facility]     Functional Endurance f-  -AC       Row Name 08/11/24 1103          Balance    Balance Assessment standing dynamic balance  -     Dynamic Standing Balance minimal assist;contact guard;1-person assist  -     Position/Device Used, Standing Balance supported;walker, rolling  -     Balance Interventions standing;sit to stand;supported;dynamic;minimal challenge;occupation based/functional task  -               User Key  (r) = Recorded By, (t) = Taken By, (c) = Cosigned By      Initials Name Provider Type     Lisette Sierra OT Occupational Therapist                   Goals/Plan       Row Name 08/11/24 1107          Bed Mobility Goal 1 (OT)    Activity/Assistive Device (Bed Mobility Goal 1, OT) bed mobility activities, all  -AC     [place] Level/Cues Needed (Bed Mobility Goal 1, OT) modified independence  -AC     Time Frame (Bed Mobility Goal 1, OT) long term goal (LTG);10 days  -AC       Row Name 08/11/24 1107          Transfer Goal 1 (OT)    Activity/Assistive Device (Transfer Goal 1, OT) transfers, all  -AC     [place] Level/Cues Needed (Transfer Goal 1, OT) modified independence  -AC     Time Frame (Transfer Goal 1, OT) long term goal (LTG);10 days  -AC       Row Name 08/11/24 1107          Bathing Goal 1 (OT)    Activity/Device (Bathing Goal 1, OT) bathing skills, all  -AC     [place]  Level/Cues Needed (Bathing Goal 1, OT) modified independence  -AC     Time Frame (Bathing Goal 1, OT) long term goal (LTG);10 days  -AC       Row Name 08/11/24 1107          Dressing Goal 1 (OT)    Activity/Device (Dressing Goal 1, OT) dressing skills, all  -AC     [place]/Cues Needed (Dressing Goal 1, OT) modified independence  -AC     Time Frame (Dressing Goal 1, OT) long term goal (LTG);10 days  -AC       Row Name 08/11/24 1107          Toileting Goal 1 (OT)    Activity/Device (Toileting Goal 1, OT) toileting skills, all  -AC     [place] Level/Cues Needed (Toileting Goal 1, OT) modified independence  -AC     Time Frame (Toileting Goal 1, OT) long term goal (LTG);10 days  -AC       Row Name 08/11/24 1107          Strength Goal 1 (OT)    Strength Goal 1 (OT) patient will improve BUE strength to 5/5 for adls  -AC     Time Frame (Strength Goal 1, OT) long term goal (LTG);10 days  -AC       Row Name 08/11/24 1107          Problem Specific Goal 1 (OT)    Problem Specific Goal 1 (OT) Patietn will improve endurance to fair/fair plus for adls  -AC     Time Frame (Problem Specific Goal 1, OT) long term goal (LTG);10 days  -AC       Row Name 08/11/24 1107          Therapy Assessment/Plan (OT)    Planned Therapy Interventions (OT) activity tolerance training;functional balance retraining;occupation/activity based interventions;ROM/therapeutic exercise;transfer/mobility retraining;patient/caregiver education/training;BADL retraining  -               User Key  (r) = Recorded By, (t) = Taken By, (c) = Cosigned By      Initials Name Provider Type    AC Lisette Sierra OT Occupational Therapist                   Clinical Impression       Row Name 08/11/24 1104          Pain Assessment    Pretreatment Pain Rating 0/10 - no pain  -AC     Posttreatment Pain Rating 0/10 - no pain  -       Row Name 08/11/24 1104          Plan of Care Review    Plan of Care Reviewed With patient;family  -     Progress no change  -      Outcome Evaluation Patient presents with balance, strength and endurance limitations that impede his/her ability to perform ADLS. The skills of a therapist are necessary to maximize independence with ADLs.  -       Row Name 08/11/24 1104          Therapy Assessment/Plan (OT)    Patient/Family Therapy Goal Statement (OT) Patient would like to maximize independence with adls.  -     Rehab Potential (OT) good, to achieve stated therapy goals  -     Criteria for Skilled Therapeutic Interventions Met (OT) yes;meets criteria;skilled treatment is necessary  -     Therapy Frequency (OT) 5 times/wk  -       Row Name 08/11/24 1104          Therapy Plan Review/Discharge Plan (OT)    Equipment Needs Upon Discharge (OT) commode chair;walker, rolling  -     Anticipated Discharge Disposition (OT) skilled nursing facility  -       Row Name 08/11/24 1104          Positioning and Restraints    Pre-Treatment Position in bed  -     Post Treatment Position bed  -AC     In Bed fowlers;call light within reach;encouraged to call for assist;exit alarm on;with family/caregiver  -               User Key  (r) = Recorded By, (t) = Taken By, (c) = Cosigned By      Initials Name Provider Type     Lisette Sierra, OT Occupational Therapist                   Outcome Measures       Row Name 08/11/24 1110          How much help from another is currently needed...    Putting on and taking off regular lower body clothing? 2  -AC     Bathing (including washing, rinsing, and drying) 2  -AC     Toileting (which includes using toilet bed pan or urinal) 3  -AC     Putting on and taking off regular upper body clothing 4  -AC     Taking care of personal grooming (such as brushing teeth) 4  -AC     Eating meals 4  -AC     AM-PAC 6 Clicks Score (OT) 19  -       Row Name 08/11/24 1110          Functional Assessment    Outcome Measure Options AM-PAC 6 Clicks Daily Activity (OT);Optimal Instrument  -       Row Name 08/11/24 1110           Optimal Instrument    Optimal Instrument Optimal - 3  -AC     Bending/Stooping 4  -AC     Standing 2  -AC     Reaching 1  -AC     From the list, choose the 3 activities you would most like to be able to do without any difficulty Bending/stooping;Standing;Reaching  -AC     Total Score Optimal - 3 7  -AC               User Key  (r) = Recorded By, (t) = Taken By, (c) = Cosigned By      Initials Name Provider Type    Lisette Saha OT Occupational Therapist                      OT Recommendation and Plan  Planned Therapy Interventions (OT): activity tolerance training, functional balance retraining, occupation/activity based interventions, ROM/therapeutic exercise, transfer/mobility retraining, patient/caregiver education/training, BADL retraining  Therapy Frequency (OT): 5 times/wk  Plan of Care Review  Plan of Care Reviewed With: patient, family  Progress: no change  Outcome Evaluation: Patient presents with balance, strength and endurance limitations that impede his/her ability to perform ADLS. The skills of a therapist are necessary to maximize independence with ADLs.     Time Calculation:   Evaluation Complexity (OT)  Review Occupational Profile/Medical/Therapy History Complexity: brief/low complexity  Assessment, Occupational Performance/Identification of Deficit Complexity: 1-3 performance deficits  Clinical Decision Making Complexity (OT): problem focused assessment/low complexity  Overall Complexity of Evaluation (OT): low complexity     Time Calculation- OT       Row Name 08/11/24 1114             Time Calculation- OT    OT Received On 08/11/24  -AC      OT Goal Re-Cert Due Date 08/20/24  -AC         Untimed Charges    OT Eval/Re-eval Minutes 27  -AC         Total Minutes    Untimed Charges Total Minutes 27  -AC       Total Minutes 27  -AC                User Key  (r) = Recorded By, (t) = Taken By, (c) = Cosigned By      Initials Name Provider Type    Lisette Saha OT Occupational Therapist                   Therapy Charges for Today       Code Description Service Date Service Provider Modifiers Qty    [id number] HC OT EVAL LOW COMPLEXITY 2 8/11/2024 Lisette Sierra, OT GO 1                 Lisette Sierra OT  8/11/2024

## 2024-08-11 NOTE — PLAN OF CARE
Goal Outcome Evaluation:              Outcome Evaluation: VSS, pt on 2L NC, will d/c with O2. Walked the hallway with walker and standby assistane, will go home with walker. Slept majority of the day, woke up feeling rested and well.  No complaints or concerns.

## 2024-08-11 NOTE — OUTREACH NOTE
Prep Survey      Flowsheet Row Responses   [facility] facility patient discharged from? [facility]   Is LACE score < 7 ? No   Eligibility Readm Mgmt   Discharge diagnosis COPD exacerbation R   Does the patient have one of the following disease processes/diagnoses(primary or secondary)? COPD   Does the patient have Home health ordered? No   Is there a DME ordered? Yes   What DME was ordered? walker, hospital bed & O2   Prep survey completed? Yes            Aggie GABRIEL - Registered Nurse

## 2024-08-11 NOTE — PLAN OF CARE
Goal Outcome Evaluation:  Plan of Care Reviewed With: patient, family        Progress: no change  Outcome Evaluation: Patient presents with balance, strength and endurance limitations that impede his/her ability to perform ADLS. The skills of a therapist are necessary to maximize independence with ADLs.      Anticipated Discharge Disposition (OT): skilled nursing facility

## 2024-08-11 NOTE — PROCEDURES
Walking Oximetry Progress Note      Patient Name:  Danielle Granda  YOB: 1940  Date of Procedure: 08/11/24              ROOM AIR BASELINE   SpO2% 95   Heart Rate 85     EXERCISE ON ROOM AIR SpO2% EXERCISE ON O2 LPM SpO2%   1 MINUTE 92 1 MINUTE 2 92   2 MINUTES 90 2 MINUTES 2 94   3 MINUTES 91 3 MINUTES 2 94   4 MINUTES 87 4 MINUTES 2 95   5 MINUTES 84 5 MINUTES 2 94   6 MINUTES  6 MINUTES 2 94              Time to Recovery  1 min   SpO2% Post Exercise  92 on 2lnc.    HR Post Exercise  88     Comments:           Electronically signed by Diann Cervantes, RRT, 08/11/24, 12:13 PM EDT.   96

## 2024-08-11 NOTE — PLAN OF CARE
Goal Outcome Evaluation:  Plan of Care Reviewed With: patient, family        Progress: improving  Outcome Evaluation: Patient was weaned down to 3L NC with oxygen saturation at 94%.  No complaints of pain this shift.  Patient was very restless and unable to sleep, Xanax given see MAR.  Patient was able to rest after the Xanax was given.  Patient was alert and oriented X4 at the beginning of the shift but as the shift went on, intermittent confusion was noted.  Family remained in the room.  Bed alert & chair alerts set.

## 2024-08-11 NOTE — CONSULTS
[facility]   Consult Note    Patient Name: Danielle Granda  : 1940  MRN: [id number]  Primary Care Physician: Santo Carter MD  Referring Physician: No ref. provider found  Date of admission: 2024    Subjective   Subjective     Reason for Consult/ Chief Complaint: 84-year-old female admitted with the symptoms of hypoxemia and associated pulmonary embolism  Has previously been seen for her left upper lobe 1.2 cm spiculated nodule    HPI:  Danielle Granda is a 84 y.o. female She is known to me from her previous consult while she was admitted and had a hip replacement.  She is a long-term smoker and has been smoking for the last 60 years and still continues to smoke  Respiratory status was generally stable except that she was having hypoxemia at home during physical therapy, and then she was sent back to the emergency department for further evaluation.  According to her son her oxygen saturation was dropping to 70%.  She was also having tachypnea but was not having any acute respiratory distress.  She did not have any fever or chills and was not coughing up any large amount of yellow-green phlegm.  There was no pleuritic type pain.  Further workup in the emergency department with the CT scan showed pulmonary embolism in the right lung.  CT chest with contrast was showing findings compatible with pulmonary embolism in the right middle lobe subsegmental artery.  She also has irregular spiculated nodule in the left upper lobe which was of found during the previous hospitalization and consult it is 1.2 cm and it has been notified to her son clearly that she needs a follow-up so that we can do a PET/CT or consider a biopsy.  Currently her respiratory status is stable and she is not in any acute distress.  Her oxygen saturation is 96% on 2 L and she is going home with oxygen to be used regularly        Review of Systems   All systems were reviewed and negative except for: Shortness of breath and  hypoxemia    Personal History     Past Medical History:   Diagnosis Date    Depression     Hypertension        Past Surgical History:   Procedure Laterality Date    HIP INTERTROCHANTERIC NAILING Left 7/30/2024    Procedure: HIP INTERTROCHANTERIC NAILING;  Surgeon: Dutch Farmer MD;  Location: [facility] OR;  Service: Orthopedics;  Laterality: Left;    HYSTERECTOMY         Family History: family history includes Lung cancer in her sister. Otherwise pertinent FHx was reviewed and not pertinent to current issue.    Social History:  reports that she has been smoking cigarettes. She has never used smokeless tobacco. She reports that she does not drink alcohol and does not use drugs.    Home Medications:  Budeson-Glycopyrrol-Formoterol, albuterol sulfate HFA, amLODIPine, apixaban, aspirin, metoprolol succinate XL, nicotine, pravastatin, predniSONE, sennosides-docusate, sertraline, and traMADol      Allergies:  No Known Allergies    Objective    Objective   Vitals:  Temp:  [97.3 °F (36.3 °C)-98.4 °F (36.9 °C)] 97.3 °F (36.3 °C)  Heart Rate:  [66-89] 76  Resp:  [16-20] 20  BP: (120-140)/(54-80) 127/69  Flow (L/min):  [2-4.5] 2    Physical Exam:   Constitutional: Awake, alert   Eyes: PERRLA, sclerae anicteric, no conjunctival injection   HENT: NCAT, mucous membranes moist   Neck: Supple, no thyromegaly, no lymphadenopathy, trachea midline   Respiratory: Clear to auscultation bilaterally, nonlabored respirations    Cardiovascular: RRR, no murmurs, rubs, or gallops, palpable pedal pulses bilaterally   Gastrointestinal: Positive bowel sounds, soft, nontender, nondistended   Musculoskeletal: No bilateral ankle edema, no clubbing or cyanosis to extremities   Psychiatric: Appropriate affect, cooperative   Neurologic: Oriented x 3, strength symmetric in all extremities, Cranial Nerves grossly intact to confrontation, speech clear   Skin: No rashes         Result Review    Result Review:  I have personally reviewed the results  "from the time of this admission to 08/11/24 2:41 PM EDT and agree with these findings:  [x]  Laboratory  [x]  Microbiology  [x]  Radiology  []  EKG/Telemetry   []  Cardiology/Vascular   []  Pathology  []  Old records  []  Other:  Most notable findings include: CT scan with right middle lobe subsegmental pulmonary artery embolism    Results from last 7 days   Lab Units 08/10/24  0358 08/09/24  1048 08/05/24  0418   WBC 10*3/mm3 9.75 10.56 8.27   HEMOGLOBIN g/dL 9.4* 9.5* 9.0*   HEMATOCRIT % 29.6* 28.9* 27.6*   PLATELETS 10*3/mm3 294 292 210     Results from last 7 days   Lab Units 08/10/24  0358 08/09/24  1048 08/06/24  0908 08/05/24  0418   SODIUM mmol/L 140 140 139 137   POTASSIUM mmol/L 4.4 3.0* 3.4* 3.3*   CHLORIDE mmol/L 103 100 100 99   CO2 mmol/L 27.0 28.4 29.2* 25.9   BUN mg/dL 20 17 21 17   CREATININE mg/dL 0.51* 0.54* 0.54* 0.44*   CALCIUM mg/dL 9.5 9.3 9.0 9.2   BILIRUBIN mg/dL 0.9 1.3*  --   --    ALK PHOS U/L 137* 119*  --   --    ALT (SGPT) U/L 10 10  --   --    AST (SGOT) U/L 18 16  --   --    GLUCOSE mg/dL 182* 113* 92 90     No results for input(s): \"PHART\", \"GML6YBP\", \"PO2ART\", \"POR0IMN\", \"BASEEXCESS\" in the last 8760 hours.    No radiology results for the last day     Assessment & Plan   Assessment / Plan     Brief Patient Summary:  Danielle Granda is a 84 y.o. female who has a left upper lobe nodule 1.2 cm spiculated  She was discharged home after having a hip replacement.  During the physical therapy at home she was found to be hypoxemic and was sent to the emergency department    In the ER she was found to have pulmonary embolism in the right middle lobe subsegmental artery.  Patient has been started on Eliquis and she is comfortable right now    No acute distress is noted at this time, she is fully conscious and alert without any acute distress.  She is going home on oxygen and will continue Eliquis regularly    Her son has been notified that she needs to follow with me closely in the office " in 1 week so that we can do a PET/CT to evaluate her left upper lobe pulmonary nodule.  It has been notified to him that if that PET scan is positive for malignant cells then a biopsy possibly an EBUS biopsy would be most appropriate.    He understands it very well and agrees to follow I will see her in my office in 1 week postdischarge.    Active Hospital Problems:  Active Hospital Problems    Diagnosis     History of bradycardia     Pulmonary embolus     Essential hypertension        Plan: She is currently stable and is ready to go home  Since she has been a smoker long time and being admitted with significant hypoxemia with associated pulmonary embolism  I am considering to do an ABG to make sure that she is not hypercapnic because she is going home with oxygen.  I will see her in my office postdischarge in 1 week her son has been notified      Electronically signed by Chester Lieberman MD, 08/11/24, 2:41 PM EDT.

## 2024-08-12 ENCOUNTER — TELEPHONE (OUTPATIENT)
Dept: ORTHOPEDIC SURGERY | Facility: CLINIC | Age: 84
End: 2024-08-12

## 2024-08-12 NOTE — TELEPHONE ENCOUNTER
[facility] staff attempted to follow warm transfer process and was unsuccessful     Caller: ted boles    Relationship to patient: Emergency Contact    Best call back number: [phone]    Patient is needing: TED STATES PATIENT WAS JUST RELEASED FROM THE HOSPITAL DUE TO A BLOOD CLOT, AND SHE'S NOT MOVING AROUND VERY WELL. SHE STATES HOME HEALTH IS COMING OUT TODAY, AND IS ASKING IF THEY CAN EVALUATE AND REMOVE PATIENT'S STITCHES. [facility] UNABLE TO R/S POST-OP UNTIL 09/06/24.

## 2024-08-12 NOTE — TELEPHONE ENCOUNTER
CALLED AND SPOKE WITH DAUGHTER AND SHE VOICED UNDERSTANDING OF THE POST OP CARE FOR HER MOTHER.  HOME HEALTH TO TAKE CARE OF STAPLE REMOVAL AND INCISION CARE PLAN.  APPT WITH DR LIU 8-19-24 AT 1PM

## 2024-08-12 NOTE — TELEPHONE ENCOUNTER
CALLED AND SPOKE TO DAUGHTER, ANA.  APPOINTMENT CANCELED FOR TODAY.  PATIENT WAS GIVEN APPOINTMENT FOR 8-19-24 AT 1 IN OFFICE WITH DR LIU.  VERBAL ORDERS GIVEN TO ENRIQUE LOPEZ FOR STAPLE REMOVAL AND STERI-STRIPS AND WOUND CARE.

## 2024-08-13 ENCOUNTER — READMISSION MANAGEMENT (OUTPATIENT)
Dept: CALL CENTER | Facility: HOSPITAL | Age: 84
End: 2024-08-13
Payer: MEDICARE

## 2024-08-13 NOTE — OUTREACH NOTE
COPD/PN Week 1 Survey      Flowsheet Row Responses   [facility] facility patient discharged from? [facility]   Does the patient have one of the following disease processes/diagnoses(primary or secondary)? COPD   Week 1 attempt successful? No   Unsuccessful attempts Attempt 1            Kaylen DOMINGO - Registered Nurse

## 2024-08-15 ENCOUNTER — TELEPHONE (OUTPATIENT)
Dept: ORTHOPEDIC SURGERY | Facility: CLINIC | Age: 84
End: 2024-08-15
Payer: MEDICARE

## 2024-08-15 LAB
QT INTERVAL: 387 MS
QTC INTERVAL: 456 MS

## 2024-08-15 NOTE — TELEPHONE ENCOUNTER
I SPOKE WITH ENRIQUE FROM [facility] AGAIN.  SHE VOICED UNDERSTANDING THAT STAPLES WILL BE REMOVED MY HOME HEALTH NURSE AND WOUND CARE DONE.  I WILL SPEAK TO DR LIU ABOUT THE NEED FOR FOLLOW UP CARE AND XRAYS AND CALL THE PATIENT'S DAUGHTER.  XRAYS WILL NEED TO BE DONE BY [facility] BECAUSE OF INSURANCE.

## 2024-08-20 ENCOUNTER — READMISSION MANAGEMENT (OUTPATIENT)
Dept: CALL CENTER | Facility: HOSPITAL | Age: 84
End: 2024-08-20
Payer: MEDICARE

## 2024-08-20 NOTE — OUTREACH NOTE
COPD/PN Week 2 Survey      Flowsheet Row Responses   [facility] facility patient discharged from? [facility]   Does the patient have one of the following disease processes/diagnoses(primary or secondary)? COPD   Week 2 attempt successful? No   Unsuccessful attempts Attempt 1            Kaylen DOMINGO - Registered Nurse

## 2024-08-27 ENCOUNTER — READMISSION MANAGEMENT (OUTPATIENT)
Dept: CALL CENTER | Facility: HOSPITAL | Age: 84
End: 2024-08-27
Payer: MEDICARE

## 2024-08-27 NOTE — OUTREACH NOTE
"COPD/PN Week 3 Survey      Flowsheet Row Responses   [facility] patient discharged from? [facility]   Does the patient have one of the following disease processes/diagnoses(primary or secondary)? COPD   Week 3 attempt successful? Yes   Call start time 1607   Call end time 1611   Discharge diagnosis COPD exacerbation   Is patient permission given to speak with other caregiver? Yes   List who call center can speak with Daughter   Person spoke with today (if not patient) and relationship Daughter   Meds reviewed with patient/caregiver? Yes   Is the patient taking all medications as directed (includes completed medication regime)? Yes   Does the patient have a primary care provider?  Yes   Has the patient kept scheduled appointments due by today? No   Nursing Interventions Advised to reschedule appointment   What is the Home health agency?  [facility]   Home health comments Nurse visits weekly   Pulse Ox monitoring Intermittent   Pulse Ox device source Patient   O2 Sat comments Pulse ox reads \"in the 90's\"-pt wears 2.5L O2   O2 Sat: education provided Sat levels, Monitoring frequency, When to seek care   Did the patient receive a copy of their discharge instructions? Yes   Nursing interventions Reviewed instructions with patient   What is the patient's perception of their health status since discharge? Improving   Nursing Interventions Nurse provided patient education   If the patient is a current smoker, are they able to teach back resources for cessation? [id number]   Is the patient/caregiver able to teach back the hierarchy of who to call/visit for symptoms/problems? PCP, Specialist, Home health nurse, Urgent Care, ED, 911 Yes   Week 3 call completed? Yes   Would this patient benefit from a Referral to Amb Social Work? Yes   Reason for Social Work Referral Transportation, Financial  [Pt's daughter Alysia inquiring about transportation and Medicaid]   Call end time 1611            Claudia H - Registered Nurse  "

## 2024-08-29 ENCOUNTER — PATIENT OUTREACH (OUTPATIENT)
Age: 84
End: 2024-08-29
Payer: MEDICARE

## 2024-08-29 NOTE — OUTREACH NOTE
Social Work Assessment  Questions/Answers      Flowsheet Row Most Recent Value   Referral Source nursing   Reason for Consult community resources   Preferred Language English   People in Home child(sandeep), adult   Current Living Arrangements home   Primary Care Provided by child(sandeep)   Family Caregiver if Needed child(sandeep), adult   Quality of Family Relationships helpful   Source of Income social security   Application for Public Assistance obtained public assistance pending number          SDOH updated and reviewed with the patient during this program:  --     Disabilities: At Risk (8/9/2024)    Disabilities     Concentrating, Remembering, or Making Decisions Difficulty: no     Doing Errands Independently Difficulty: yes      --     Employment: Not At Risk (7/29/2024)    Employment     Do you want help finding or keeping work or a job?: I do not need or want help      Financial Resource Strain: Low Risk  (7/29/2024)    Overall Financial Resource Strain (CARDIA)     Difficulty of Paying Living Expenses: Not very hard      --     Food Insecurity: No Food Insecurity (8/11/2024)    Hunger Vital Sign     Worried About Running Out of Food in the Last Year: Never true     Ran Out of Food in the Last Year: Never true      --     Housing Stability: Not At Risk (8/29/2024)    Housing Stability     Current Living Arrangements: home     Potentially Unsafe Housing Conditions: none      --     Transportation Needs: No Transportation Needs (8/11/2024)    PRAPARE - Transportation     Lack of Transportation (Medical): No     Lack of Transportation (Non-Medical): No      --     Utilities: Not At Risk (8/11/2024)    [facility] Utilities     Threatened with loss of utilities: No     Patient Outreach    MSW spoke with patient's daughter to discuss Medicaid application and transportation benefits. MSW provided local [employer] information, [employer] information, [employer] senior supports. MSW also encouraged patient's daughter to inquire with patient's  information through insurance on extra benefits. MSW available should patient need further information/resources.    Vernell MCCONNELL -   Ambulatory Case Management    8/29/2024, 15:25 EDT

## 2024-09-04 ENCOUNTER — TELEPHONE (OUTPATIENT)
Dept: ORTHOPEDIC SURGERY | Facility: CLINIC | Age: 84
End: 2024-09-04
Payer: MEDICARE

## 2024-09-04 NOTE — TELEPHONE ENCOUNTER
PATIENT'S DAUGHTER WAS CALLED.  NO ANSWERING MACHINE.  I WAS GOING TO DISCUSS FOLLOW UP CARE WITH HER AND SEE ABOUT GETTING HER INTO OFFICE AND XRAYS NEEDS.  DR LIU WOULD LIKE TO SEE HER AND OBTAIN FILMS APPROX.  9-18-24.  (6 WEEKS AFTER HOSPITAL STAY)

## 2025-03-03 ENCOUNTER — TRANSCRIBE ORDERS (OUTPATIENT)
Dept: ADMINISTRATIVE | Facility: HOSPITAL | Age: 85
End: 2025-03-03
Payer: MEDICARE

## 2025-03-03 DIAGNOSIS — R91.1 PULMONARY NODULE: Primary | ICD-10-CM

## 2025-08-21 ENCOUNTER — HOSPITAL ENCOUNTER (EMERGENCY)
Facility: HOSPITAL | Age: 85
Discharge: HOME | End: 2025-08-22
Attending: EMERGENCY MEDICINE
Payer: MEDICARE

## 2025-08-21 ENCOUNTER — APPOINTMENT (OUTPATIENT)
Dept: GENERAL RADIOLOGY | Facility: HOSPITAL | Age: 85
End: 2025-08-21
Payer: MEDICARE

## (undated) DEVICE — KENDALL SCD EXPRESS SLEEVES, KNEE LENGTH, MEDIUM: Brand: KENDALL SCD

## (undated) DEVICE — SUT VIC UD BR COAT 0 CP2 27IN

## (undated) DEVICE — PENCL ES MEGADINE EZ/CLEAN BUTN W/HOLSTR 10FT

## (undated) DEVICE — SUPER SPONGES,MEDIUM: Brand: KERLIX

## (undated) DEVICE — PROXIMATE RH ROTATING HEAD SKIN STAPLERS (35 WIDE) CONTAINS 35 STAINLESS STEEL STAPLES: Brand: PROXIMATE

## (undated) DEVICE — PAD GRND REM POLYHESIVE A/ DISP

## (undated) DEVICE — DRESSING,GAUZE,XEROFORM,CURAD,1"X8",ST: Brand: CURAD

## (undated) DEVICE — GAUZE,SPONGE,4"X4",16PLY,STRL,LF,10/TRAY: Brand: MEDLINE

## (undated) DEVICE — GOWN,REINFORCE,POLY,SIRUS,BREATH SLV,XLG: Brand: MEDLINE

## (undated) DEVICE — ENCORE® LATEX ORTHO SIZE 8, STERILE LATEX POWDER-FREE SURGICAL GLOVE: Brand: ENCORE

## (undated) DEVICE — GLV SURG ULTRAFREE MAX LTX PF 8

## (undated) DEVICE — UNDYED BRAIDED (POLYGLACTIN 910), SYNTHETIC ABSORBABLE SUTURE: Brand: COATED VICRYL

## (undated) DEVICE — Device

## (undated) DEVICE — COVER,C-ARM,41X74: Brand: MEDLINE

## (undated) DEVICE — MAT FLR ABS W/BLU/LINER 56X72IN WHT

## (undated) DEVICE — STERILE POLYISOPRENE POWDER-FREE SURGICAL GLOVES: Brand: PROTEXIS

## (undated) DEVICE — GW TEAR DROP NAT 3X100CM

## (undated) DEVICE — KT PT POSITION SUPINE HANA/PROFX TABL

## (undated) DEVICE — APPL CHLORAPREP HI/LITE 26ML ORNG

## (undated) DEVICE — 3M(TM) TEGADERM(TM) IV TRANSPARENT FILM DRESSING WITH BORDER 1650: Brand: 3M™ TEGADERM™

## (undated) DEVICE — Device: Brand: COVER, PERINEAL POST, 12 PK

## (undated) DEVICE — TOWEL,OR,DSP,ST,BLUE,STD,4/PK,20PK/CS: Brand: MEDLINE

## (undated) DEVICE — INTENDED FOR TISSUE SEPARATION, AND OTHER PROCEDURES THAT REQUIRE A SHARP SURGICAL BLADE TO PUNCTURE OR CUT.: Brand: BARD-PARKER ® CARBON RIB-BACK BLADES

## (undated) DEVICE — GUIDEPIN TEMP THRD 3.2X508MM DISP

## (undated) DEVICE — DRSNG SURESITE WNDW 4X4.5

## (undated) DEVICE — BLANKT WARM PACU MISTRAL/AIR PREM REFL A/ 85.8X50IN

## (undated) DEVICE — CVR LEG BOOTLEG F/R NOSKID 33IN

## (undated) DEVICE — MINOR-LF: Brand: MEDLINE INDUSTRIES, INC.